# Patient Record
Sex: MALE | Race: WHITE | Employment: UNEMPLOYED
[De-identification: names, ages, dates, MRNs, and addresses within clinical notes are randomized per-mention and may not be internally consistent; named-entity substitution may affect disease eponyms.]

---

## 2017-12-13 PROBLEM — J40 BRONCHITIS: Status: ACTIVE | Noted: 2017-12-13

## 2017-12-13 PROBLEM — J16.0 CAP (COMMUNITY ACQUIRED PNEUMONIA) DUE TO CHLAMYDIA SPECIES: Status: ACTIVE | Noted: 2017-12-13

## 2017-12-13 PROBLEM — R09.02 HYPOXIA: Status: ACTIVE | Noted: 2017-12-13

## 2017-12-14 ENCOUNTER — TELEPHONE (OUTPATIENT)
Dept: CASE MANAGEMENT | Age: 57
End: 2017-12-14

## 2017-12-14 NOTE — TELEPHONE ENCOUNTER
Spoke with pt regarding pulmonary nodule finding on CTA. Pt states he is following up with his PCP and does not need services of [address] [place] [address] my number if he should have any further questions.

## 2020-02-01 ENCOUNTER — APPOINTMENT (OUTPATIENT)
Dept: GENERAL RADIOLOGY | Age: 60
End: 2020-02-01
Payer: MEDICARE

## 2020-02-01 ENCOUNTER — HOSPITAL ENCOUNTER (EMERGENCY)
Age: 60
Discharge: HOME | End: 2020-02-01
Attending: EMERGENCY MEDICINE
Payer: MEDICARE

## 2020-02-01 VITALS
OXYGEN SATURATION: 97 % | DIASTOLIC BLOOD PRESSURE: 97 MMHG | RESPIRATION RATE: 16 BRPM | WEIGHT: 240 LBS | HEART RATE: 66 BPM | TEMPERATURE: 98.3 F | BODY MASS INDEX: 36.37 KG/M2 | SYSTOLIC BLOOD PRESSURE: 156 MMHG | HEIGHT: 68 IN

## 2020-02-01 LAB
RAPID INFLUENZA  B AGN: NEGATIVE
RAPID INFLUENZA A AGN: NEGATIVE

## 2020-02-01 PROCEDURE — 99283 EMERGENCY DEPT VISIT LOW MDM: CPT

## 2020-02-01 PROCEDURE — 71046 X-RAY EXAM CHEST 2 VIEWS: CPT

## 2020-02-01 PROCEDURE — 6360000002 HC RX W HCPCS: Performed by: EMERGENCY MEDICINE

## 2020-02-01 PROCEDURE — 87804 INFLUENZA ASSAY W/OPTIC: CPT

## 2020-02-01 PROCEDURE — 6370000000 HC RX 637 (ALT 250 FOR IP): Performed by: EMERGENCY MEDICINE

## 2020-02-01 RX ORDER — PREDNISONE 20 MG/1
60 TABLET ORAL ONCE
Status: COMPLETED | OUTPATIENT
Start: 2020-02-01 | End: 2020-02-01

## 2020-02-01 RX ORDER — BENZONATATE 100 MG/1
100 CAPSULE ORAL 3 TIMES DAILY PRN
Qty: 20 CAPSULE | Refills: 0 | Status: SHIPPED | OUTPATIENT
Start: 2020-02-01 | End: 2020-02-08

## 2020-02-01 RX ORDER — BENZONATATE 100 MG/1
100 CAPSULE ORAL ONCE
Status: COMPLETED | OUTPATIENT
Start: 2020-02-01 | End: 2020-02-01

## 2020-02-01 RX ORDER — ALBUTEROL SULFATE 2.5 MG/3ML
5 SOLUTION RESPIRATORY (INHALATION) ONCE
Status: COMPLETED | OUTPATIENT
Start: 2020-02-01 | End: 2020-02-01

## 2020-02-01 RX ORDER — ALBUTEROL SULFATE 90 UG/1
2 AEROSOL, METERED RESPIRATORY (INHALATION) EVERY 4 HOURS PRN
Qty: 1 INHALER | Refills: 0 | Status: SHIPPED | OUTPATIENT
Start: 2020-02-01 | End: 2020-02-08

## 2020-02-01 RX ORDER — PREDNISONE 20 MG/1
40 TABLET ORAL DAILY
Qty: 10 TABLET | Refills: 0 | Status: SHIPPED | OUTPATIENT
Start: 2020-02-01 | End: 2020-02-06

## 2020-02-01 RX ORDER — FLUTICASONE PROPIONATE 50 MCG
1 SPRAY, SUSPENSION (ML) NASAL DAILY
Qty: 1 BOTTLE | Refills: 0 | Status: SHIPPED | OUTPATIENT
Start: 2020-02-01 | End: 2020-02-11

## 2020-02-01 RX ADMIN — PREDNISONE 60 MG: 20 TABLET ORAL at 05:29

## 2020-02-01 RX ADMIN — BENZONATATE 100 MG: 100 CAPSULE ORAL at 05:29

## 2020-02-01 RX ADMIN — ALBUTEROL SULFATE 5 MG: 2.5 SOLUTION RESPIRATORY (INHALATION) at 05:29

## 2020-02-01 ASSESSMENT — PAIN DESCRIPTION - LOCATION: LOCATION: GENERALIZED

## 2020-02-01 ASSESSMENT — PAIN SCALES - GENERAL: PAINLEVEL_OUTOF10: 7

## 2020-02-01 ASSESSMENT — PAIN DESCRIPTION - PAIN TYPE: TYPE: ACUTE PAIN

## 2020-02-01 ASSESSMENT — PAIN DESCRIPTION - DESCRIPTORS: DESCRIPTORS: ACHING

## 2020-02-01 NOTE — ED PROVIDER NOTES
Emergency Physician Note    Chief Complaint  Cough (pt comes in with cough for about a week , felling like \"crap\". has a PCP appt tuesday but feeling too bad to wait. )       History of Present Illness  Nicola Snow Covert is a 63 y.o. male who presents to the ED for upper respiratory infection. Patient states for the past 5 days he has had a cough that is intermittently productive of green phlegm. He said at one point he did have a fever as high as 102 °F.  He does smoke but he admits that he has been avoiding cigarette for the last 5 days due to his cough. He is also got sinus pressure nasal congestion and postnasal drip. Patient does have an upcoming appointment with his primary care physician but he states that he is having trouble sleeping due to the cough and that is why came in for further evaluation. Denies   chills, malaise, chest pain, shortness of breath,   abdominal pain, nausea, vomiting, diarrhea, headache, sore throat, dysuria, back pain, rash. No palliative/provocative factors. Unless otherwise stated in this report or unable to obtain because of the patient's clinical or mental status as evidenced by the medical record, this patient's positive and negative responses for review of systems, constitutional, psych, eyes, ENT, cardiovascular, respiratory, gastrointestinal, neurological, genitourinary, musculoskeletal, integument systems and systems related to the presenting problem are either stated in the preceding paragraph or were not pertinent or were negative for the symptoms and/or complaints related to the medical problem. I have reviewed the following from the nursing documentation:      Prior to Admission medications    Medication Sig Start Date End Date Taking?  Authorizing Provider   albuterol sulfate  (90 Base) MCG/ACT inhaler Inhale 2 puffs into the lungs every 4 hours as needed for Wheezing 2/1/20 2/8/20 Yes Wendi Pastor MD   benzonatate (TESSALON) 100 MG Refill:  0    fluticasone (FLONASE) 50 MCG/ACT nasal spray     Si spray by Nasal route daily for 10 days     Dispense:  1 Bottle     Refill:  0    predniSONE (DELTASONE) 20 MG tablet     Sig: Take 2 tablets by mouth daily for 5 days     Dispense:  10 tablet     Refill:  0       ED course notes for this visit           - Patient seen and evaluated in room 8    This is a pleasant patient whose history and physical exam appear most consistent with URI with symptoms of bronchitis, sinusitis, and/or rhinitis. I completed a structured, evidence-based clinical evaluation to determine the need for antibiotics in this patient with clinical signs and symptoms of URI. The evidence indicates that the patient is low risk for infectious process requiring antibiotics. Antibiotics have significant risks, and in this case the risk of antibiotics likely exceeds the benefit. The risk of further imaging or hospitalization is also higher than the risk of the patient having an infectious respiratory process requiring antibiotics. It is, therefore, in the patients best interest to not take antibiotics, undergo emergent testing or be hospitalized. I have discussed with the patient my clinical impression and the result of an evidence-based clinical evaluation to screen for pneumonia and/or bacterial sinusitis, as well as the risk of further testing and hospitalization. The evidence shows that the risk for pneumonia is low. Although the risk of pneumonia has not been eliminated, the risks of antibiotics, further testing or hospitalization likely exceed the benefit, and the patient declines antibiotics, further emergent evaluation, or hospitalization for pneumonia. I feel they are stable for discharge home with appropriate medications for symptomatic relief.   It is understood that if the patient is not improving as expected or if other new signs or symptoms of concern develop, other etiologies or diagnoses may need to be considered requiring other tests, treatments, consultations, and/or admission. The diagnosis, plan, expected course, follow-up, and return precautions were discussed and all questions were answered. This is a pleasant patient with an upper respiratory tract infection. On lung exam, patient does not have any wheezing, Rales, rhonchi and is not in any respiratory distress. This appears viral and without significant evidence of severe respiratory distress, severe upper or lower airway compromise, hypoxemia, toxicity, shock, sepsis, hemodynamic or cardiopulmonary instability, epiglottitis, peritonsillar abscess, retropharyngeal abscess, bacterial tracheitis, pneumonia, or any disease process requiring other immediate surgical or medical intervention at this time. It is understood that if the patient is not improving as expected or if other new symptoms or signs of concern develop, other etiologies or diagnoses may need to be considered requiring other tests, treatments, consultations, and/or admission. The diagnosis, plan, expected course, follow-up, and return precautions were discussed and all questions were answered. Final Impression    1. Acute bronchitis, unspecified organism    2. Tobacco use disorder    3. Elevated blood pressure reading    4. Acute upper respiratory infection        Blood pressure (!) 156/97, pulse 66, temperature 98.3 °F (36.8 °C), temperature source Oral, resp. rate 16, height 5' 8\" (1.727 m), weight 240 lb (108.9 kg), SpO2 97 %. Disposition  At this point I do not feel the patient requires further work up and it is reasonable to discharge the patient. I had a discussion with the patient and/or their surrogate regarding diagnosis, diagnostic testing results, treatment/ plan of care, and follow up. Patient and/or companions verbalized understanding of the ED workup, any relevant findings as well as any incidental findings, and the disposition and plan.   There was shared decision-making between myself as well as the patient and/or their surrogate and we are all in agreement with discharge home. There was an opportunity for questions and all questions were answered to the best of my ability and to the satisfaction of the patient and/or patient family. Patient agreed to follow up as recommend for further evaluation/treatment. The patient was given strict return precautions as we discussed symptoms that would necessitate return to the ED. Patient will return to ED for new/worsening symptoms. The patient verbalized their understanding and agreement with the above plan. Please refer to AVS for further details regarding discharge instructions. Patient was given scripts for the following medications. I counseled patient how to take these medications. Discharge Medication List as of 2/1/2020  5:38 AM      START taking these medications    Details   benzonatate (TESSALON) 100 MG capsule Take 1 capsule by mouth 3 times daily as needed for Cough, Disp-20 capsule, R-0Print      fluticasone (FLONASE) 50 MCG/ACT nasal spray 1 spray by Nasal route daily for 10 days, Disp-1 Bottle, R-0Print      predniSONE (DELTASONE) 20 MG tablet Take 2 tablets by mouth daily for 5 days, Disp-10 tablet, R-0Print               Pt is in stable condition upon Discharge to home. The note was completed using Dragon voice recognition transcription. Every effort was made to ensure accuracy; however, inadvertent transcription errors may be present despite my best efforts to edit errors.     Marylin Urban MD  [address] Bah MD  02/01/20 6672

## 2020-03-23 ENCOUNTER — HOSPITAL ENCOUNTER (EMERGENCY)
Age: 60
Discharge: HOME | End: 2020-03-23
Payer: MEDICARE

## 2020-03-23 ENCOUNTER — APPOINTMENT (OUTPATIENT)
Dept: CT IMAGING | Age: 60
End: 2020-03-23
Payer: MEDICARE

## 2020-03-23 VITALS
TEMPERATURE: 98.1 F | RESPIRATION RATE: 16 BRPM | SYSTOLIC BLOOD PRESSURE: 148 MMHG | HEIGHT: 68 IN | DIASTOLIC BLOOD PRESSURE: 66 MMHG | BODY MASS INDEX: 37.13 KG/M2 | HEART RATE: 87 BPM | OXYGEN SATURATION: 98 % | WEIGHT: 245 LBS

## 2020-03-23 LAB
A/G RATIO: 1.2 (ref 1.1–2.2)
ALBUMIN SERPL-MCNC: 3.8 G/DL (ref 3.4–5)
ALP BLD-CCNC: 111 U/L (ref 40–129)
ALT SERPL-CCNC: 18 U/L (ref 10–40)
ANION GAP SERPL CALCULATED.3IONS-SCNC: 13 MMOL/L (ref 3–16)
AST SERPL-CCNC: 12 U/L (ref 15–37)
BASOPHILS ABSOLUTE: 0.1 K/UL (ref 0–0.2)
BASOPHILS RELATIVE PERCENT: 0.4 %
BILIRUB SERPL-MCNC: 0.3 MG/DL (ref 0–1)
BILIRUBIN URINE: NEGATIVE
BLOOD, URINE: NEGATIVE
BUN BLDV-MCNC: 24 MG/DL (ref 7–20)
C DIFF TOXIN/ANTIGEN: NORMAL
CALCIUM SERPL-MCNC: 8.8 MG/DL (ref 8.3–10.6)
CHLORIDE BLD-SCNC: 95 MMOL/L (ref 99–110)
CLARITY: CLEAR
CO2: 24 MMOL/L (ref 21–32)
COLOR: YELLOW
CREAT SERPL-MCNC: 0.7 MG/DL (ref 0.9–1.3)
EKG ATRIAL RATE: 92 BPM
EKG DIAGNOSIS: NORMAL
EKG P AXIS: 58 DEGREES
EKG P-R INTERVAL: 166 MS
EKG Q-T INTERVAL: 354 MS
EKG QRS DURATION: 88 MS
EKG QTC CALCULATION (BAZETT): 437 MS
EKG R AXIS: 66 DEGREES
EKG T AXIS: 58 DEGREES
EKG VENTRICULAR RATE: 92 BPM
EOSINOPHILS ABSOLUTE: 0 K/UL (ref 0–0.6)
EOSINOPHILS RELATIVE PERCENT: 0 %
GFR AFRICAN AMERICAN: >60
GFR NON-AFRICAN AMERICAN: >60
GLOBULIN: 3.2 G/DL
GLUCOSE BLD-MCNC: 148 MG/DL (ref 70–99)
GLUCOSE URINE: NEGATIVE MG/DL
HCT VFR BLD CALC: 46.1 % (ref 40.5–52.5)
HEMOGLOBIN: 15.5 G/DL (ref 13.5–17.5)
KETONES, URINE: NEGATIVE MG/DL
LEUKOCYTE ESTERASE, URINE: NEGATIVE
LIPASE: 20 U/L (ref 13–60)
LYMPHOCYTES ABSOLUTE: 1.5 K/UL (ref 1–5.1)
LYMPHOCYTES RELATIVE PERCENT: 8.6 %
MCH RBC QN AUTO: 29.6 PG (ref 26–34)
MCHC RBC AUTO-ENTMCNC: 33.7 G/DL (ref 31–36)
MCV RBC AUTO: 88 FL (ref 80–100)
MICROSCOPIC EXAMINATION: NORMAL
MONOCYTES ABSOLUTE: 0.9 K/UL (ref 0–1.3)
MONOCYTES RELATIVE PERCENT: 5.6 %
NEUTROPHILS ABSOLUTE: 14.6 K/UL (ref 1.7–7.7)
NEUTROPHILS RELATIVE PERCENT: 85.4 %
NITRITE, URINE: NEGATIVE
PDW BLD-RTO: 14 % (ref 12.4–15.4)
PH UA: 6 (ref 5–8)
PLATELET # BLD: 353 K/UL (ref 135–450)
PMV BLD AUTO: 7.5 FL (ref 5–10.5)
POTASSIUM REFLEX MAGNESIUM: 4.3 MMOL/L (ref 3.5–5.1)
PROTEIN UA: NEGATIVE MG/DL
RBC # BLD: 5.24 M/UL (ref 4.2–5.9)
SODIUM BLD-SCNC: 132 MMOL/L (ref 136–145)
SPECIFIC GRAVITY UA: 1.02 (ref 1–1.03)
TOTAL PROTEIN: 7 G/DL (ref 6.4–8.2)
TROPONIN: <0.01 NG/ML
URINE REFLEX TO CULTURE: NORMAL
URINE TYPE: NORMAL
UROBILINOGEN, URINE: 0.2 E.U./DL
WBC # BLD: 17.1 K/UL (ref 4–11)

## 2020-03-23 PROCEDURE — 87449 NOS EACH ORGANISM AG IA: CPT

## 2020-03-23 PROCEDURE — 99284 EMERGENCY DEPT VISIT MOD MDM: CPT

## 2020-03-23 PROCEDURE — 85025 COMPLETE CBC W/AUTO DIFF WBC: CPT

## 2020-03-23 PROCEDURE — 81003 URINALYSIS AUTO W/O SCOPE: CPT

## 2020-03-23 PROCEDURE — 83690 ASSAY OF LIPASE: CPT

## 2020-03-23 PROCEDURE — 6360000004 HC RX CONTRAST MEDICATION: Performed by: PHYSICIAN ASSISTANT

## 2020-03-23 PROCEDURE — 87493 C DIFF AMPLIFIED PROBE: CPT

## 2020-03-23 PROCEDURE — 84484 ASSAY OF TROPONIN QUANT: CPT

## 2020-03-23 PROCEDURE — 6360000002 HC RX W HCPCS: Performed by: PHYSICIAN ASSISTANT

## 2020-03-23 PROCEDURE — 96374 THER/PROPH/DIAG INJ IV PUSH: CPT

## 2020-03-23 PROCEDURE — 6370000000 HC RX 637 (ALT 250 FOR IP): Performed by: PHYSICIAN ASSISTANT

## 2020-03-23 PROCEDURE — 2580000003 HC RX 258: Performed by: PHYSICIAN ASSISTANT

## 2020-03-23 PROCEDURE — 80053 COMPREHEN METABOLIC PANEL: CPT

## 2020-03-23 PROCEDURE — 96361 HYDRATE IV INFUSION ADD-ON: CPT

## 2020-03-23 PROCEDURE — 87505 NFCT AGENT DETECTION GI: CPT

## 2020-03-23 PROCEDURE — 93005 ELECTROCARDIOGRAM TRACING: CPT | Performed by: PHYSICIAN ASSISTANT

## 2020-03-23 PROCEDURE — 74177 CT ABD & PELVIS W/CONTRAST: CPT

## 2020-03-23 PROCEDURE — 87324 CLOSTRIDIUM AG IA: CPT

## 2020-03-23 PROCEDURE — 93010 ELECTROCARDIOGRAM REPORT: CPT | Performed by: INTERNAL MEDICINE

## 2020-03-23 RX ORDER — VANCOMYCIN HYDROCHLORIDE 125 MG/1
125 CAPSULE ORAL 4 TIMES DAILY
Qty: 40 CAPSULE | Refills: 0 | Status: SHIPPED | OUTPATIENT
Start: 2020-03-23 | End: 2020-04-02

## 2020-03-23 RX ORDER — DICYCLOMINE HYDROCHLORIDE 10 MG/1
10 CAPSULE ORAL EVERY 6 HOURS PRN
Qty: 20 CAPSULE | Refills: 0 | Status: SHIPPED | OUTPATIENT
Start: 2020-03-23

## 2020-03-23 RX ORDER — ONDANSETRON 2 MG/ML
4 INJECTION INTRAMUSCULAR; INTRAVENOUS EVERY 6 HOURS PRN
Status: DISCONTINUED | OUTPATIENT
Start: 2020-03-23 | End: 2020-03-23 | Stop reason: HOSPADM

## 2020-03-23 RX ORDER — 0.9 % SODIUM CHLORIDE 0.9 %
1000 INTRAVENOUS SOLUTION INTRAVENOUS ONCE
Status: COMPLETED | OUTPATIENT
Start: 2020-03-23 | End: 2020-03-23

## 2020-03-23 RX ORDER — ONDANSETRON 4 MG/1
4 TABLET, FILM COATED ORAL EVERY 8 HOURS PRN
Qty: 20 TABLET | Refills: 0 | Status: SHIPPED | OUTPATIENT
Start: 2020-03-23

## 2020-03-23 RX ORDER — DICYCLOMINE HYDROCHLORIDE 10 MG/1
10 CAPSULE ORAL ONCE
Status: COMPLETED | OUTPATIENT
Start: 2020-03-23 | End: 2020-03-23

## 2020-03-23 RX ADMIN — IOPAMIDOL 75 ML: 755 INJECTION, SOLUTION INTRAVENOUS at 13:27

## 2020-03-23 RX ADMIN — Medication 125 MG: at 15:33

## 2020-03-23 RX ADMIN — LIDOCAINE HYDROCHLORIDE: 20 SOLUTION ORAL; TOPICAL at 12:44

## 2020-03-23 RX ADMIN — DICYCLOMINE HYDROCHLORIDE 10 MG: 10 CAPSULE ORAL at 14:36

## 2020-03-23 RX ADMIN — SODIUM CHLORIDE 1000 ML: 9 INJECTION, SOLUTION INTRAVENOUS at 12:44

## 2020-03-23 RX ADMIN — ONDANSETRON HYDROCHLORIDE 4 MG: 2 INJECTION, SOLUTION INTRAVENOUS at 12:44

## 2020-03-23 ASSESSMENT — ENCOUNTER SYMPTOMS
VOMITING: 1
NAUSEA: 1
RESPIRATORY NEGATIVE: 1
DIARRHEA: 1

## 2020-03-23 ASSESSMENT — PAIN DESCRIPTION - LOCATION: LOCATION: BACK

## 2020-03-23 ASSESSMENT — PAIN SCALES - GENERAL: PAINLEVEL_OUTOF10: 8

## 2020-03-23 NOTE — ED PROVIDER NOTES
Negative. Positives and Pertinent negatives as per HPI. Except as noted above in the ROS, all other systems were reviewed and negative. PAST MEDICAL HISTORY     Past Medical History:   Diagnosis Date    Anxiety     GERD (gastroesophageal reflux disease)     Hypertension          SURGICAL HISTORY     Past Surgical History:   Procedure Laterality Date    ANKLE SURGERY Right     HAND SURGERY Left     KNEE SURGERY Right     ROTATOR CUFF REPAIR Right          CURRENTMEDICATIONS       Previous Medications    ALBUTEROL SULFATE  (90 BASE) MCG/ACT INHALER    Inhale 2 puffs into the lungs every 4 hours as needed for Wheezing    AMPHETAMINE-DEXTROAMPHETAMINE (ADDERALL) 30 MG TABLET    TAKE 1 TABLET BY MOUTH EVERY MORNING AND AFTERNOON    ASPIRIN 81 MG TABLET    Take 81 mg by mouth daily. FLUTICASONE (FLONASE) 50 MCG/ACT NASAL SPRAY    1 spray by Nasal route daily for 10 days    LISINOPRIL (PRINIVIL;ZESTRIL) 40 MG TABLET    Take 40 mg by mouth daily. ALLERGIES     Patient has no known allergies. FAMILYHISTORY     History reviewed. No pertinent family history. SOCIAL HISTORY       Social History     Tobacco Use    Smoking status: Current Every Day Smoker     Packs/day: 0.50     Types: Cigarettes    Smokeless tobacco: Never Used   Substance Use Topics    Alcohol use: No    Drug use: No       SCREENINGS    [place] Coma Scale  Eye Opening: Spontaneous  Best Verbal Response: Oriented  Best Motor Response: Obeys commands  [place] Coma Scale Score: 15        PHYSICAL EXAM    (up to 7 for level 4, 8 or more for level 5)     ED Triage Vitals [03/23/20 1149]   BP Temp Temp Source Pulse Resp SpO2 Height Weight   (!) 165/107 98.1 °F (36.7 °C) Oral 89 15 99 % 5' 8\" (1.727 m) 245 lb (111.1 kg)       Physical Exam  Vitals signs and nursing note reviewed. Constitutional:       General: He is awake. He is not in acute distress. Appearance: Normal appearance.  He is well-developed and which are fluid   filled. Findings consistent with mild enterocolitis. No evidence of   obstruction. No results found. PROCEDURES   Unless otherwise noted below, none     Procedures    CRITICAL CARE TIME   N/A    CONSULTS:  None      EMERGENCY DEPARTMENT COURSE and DIFFERENTIAL DIAGNOSIS/MDM:   Vitals:    Vitals:    03/23/20 1149 03/23/20 1201 03/23/20 1407   BP: (!) 165/107 (!) 169/104 (!) 156/70   Pulse: 89 84 78   Resp: 15 18 16   Temp: 98.1 °F (36.7 °C)     TempSrc: Oral     SpO2: 99% 96% 97%   Weight: 245 lb (111.1 kg)     Height: 5' 8\" (1.727 m)         Patient was given the following medications:  Medications   ondansetron (ZOFRAN) injection 4 mg (4 mg Intravenous Given 3/23/20 1244)   vancomycin (VANCOCIN) oral solution 125 mg (has no administration in time range)   0.9 % sodium chloride bolus (0 mLs Intravenous Stopped 3/23/20 1351)   aluminum & magnesium hydroxide-simethicone (MAALOX) 30 mL, lidocaine viscous hcl (XYLOCAINE) 5 mL (GI COCKTAIL) ( Oral Given 3/23/20 1244)   iopamidol (ISOVUE-370) 76 % injection 75 mL (75 mLs Intravenous Given 3/23/20 1327)   dicyclomine (BENTYL) capsule 10 mg (10 mg Oral Given 3/23/20 1436)       Patient brought in today by private vehicle for complaints of nonbilious nonbloody nausea and vomiting with nonbloody diarrhea. He also reports a lump to his left lower back. On exam he is alert oriented afebrile breathing on room air satting at 99%. Nontoxic in appearance no acute respiratory distress. Old labs and records reviewed. Patient appears to have a small lipoma to his back left lower back. I do not appreciate any abscess, skin changes color streaking or concern for infection. Patient told he can follow-up with his PCP for possible removal in a nonemergent setting. Patient initially given fluids as well as Zofran and a GI cocktail. Also given Bentyl. CBC reveals an elevated leukocytosis of 17.1. BUN of 24 with creatinine of 0.7. Sodium of 132. Blood glucose of 148. Lipase of 20. Troponin is less than 0.01. EKG was reviewed by my attending see note for dictation. CT abdomen reveals mild hyperenhancement of the small large bowel which are fluid-filled. Findings consistent with a mild enterocolitis. No evidence of obstruction. Urine is negative. He will be given Bentyl and Zofran at home. Patient p.o. challenged here and able to tolerate p.o. C. difficile results came back as indeterminant however normal range is negative will send for further testing at this time. Patient given p.o. vancomycin here given indeterminate C. difficile results. I advised him that we would call if results are positive and continue to take the vancomycin. Patient be discharged with Zofran and Bentyl for home as well as the vancomycin for his possible C. difficile. He was told to continue with fluids to stay hydrated. Stressed the importance of staying hydrated. He was told to follow-up with his PCP in the next 1 week. He was told return immediately to the ER with any new or worsening symptoms including but not limited to intractable nausea vomiting increasing pain or any new or worsening symptoms. Patient verbalized understanding of this plan was comfortable and stable at time of discharge. I did feel comfortable sending this patient home with close follow-up instructions and strict return precautions. FINAL IMPRESSION      1. Enterocolitis    2. C. difficile diarrhea          DISPOSITION/PLAN   DISPOSITION Discharge - Pending Orders Complete 03/23/2020 03:14:30 PM      PATIENT REFERREDTO:  Nicholas Cabral MD  [address]   [address] [facility] [address]  [phone]    Schedule an appointment as soon as possible for a visit in 1 day      [facility] [facility]  [address] [facility]  Schedule an appointment as soon as possible for a visit   As needed, If symptoms worsen      DISCHARGE

## 2020-03-23 NOTE — ED NOTES
Pt to/from radiology, pt requesting percocet. Provider advised.       Elizabeth Thomas RN  03/23/20 7213

## 2020-03-24 LAB
C. DIFFICILE TOXIN MOLECULAR: ABNORMAL
GI BACTERIAL PATHOGENS BY PCR: NORMAL
ORGANISM: ABNORMAL

## 2020-05-25 ENCOUNTER — APPOINTMENT (OUTPATIENT)
Dept: GENERAL RADIOLOGY | Age: 60
End: 2020-05-25
Payer: MEDICARE

## 2020-05-25 ENCOUNTER — HOSPITAL ENCOUNTER (EMERGENCY)
Age: 60
Discharge: HOME | End: 2020-05-25
Attending: EMERGENCY MEDICINE
Payer: MEDICARE

## 2020-05-25 VITALS
TEMPERATURE: 98.6 F | DIASTOLIC BLOOD PRESSURE: 87 MMHG | OXYGEN SATURATION: 98 % | RESPIRATION RATE: 18 BRPM | BODY MASS INDEX: 38.01 KG/M2 | SYSTOLIC BLOOD PRESSURE: 145 MMHG | WEIGHT: 250 LBS | HEART RATE: 78 BPM

## 2020-05-25 PROCEDURE — 99283 EMERGENCY DEPT VISIT LOW MDM: CPT

## 2020-05-25 PROCEDURE — 73610 X-RAY EXAM OF ANKLE: CPT

## 2020-05-25 PROCEDURE — 6370000000 HC RX 637 (ALT 250 FOR IP): Performed by: EMERGENCY MEDICINE

## 2020-05-25 RX ORDER — HYDROCODONE BITARTRATE AND ACETAMINOPHEN 5; 325 MG/1; MG/1
1 TABLET ORAL EVERY 4 HOURS PRN
Qty: 18 TABLET | Refills: 0 | Status: SHIPPED | OUTPATIENT
Start: 2020-05-25 | End: 2020-05-28

## 2020-05-25 RX ORDER — HYDROCODONE BITARTRATE AND ACETAMINOPHEN 5; 325 MG/1; MG/1
1 TABLET ORAL ONCE
Status: COMPLETED | OUTPATIENT
Start: 2020-05-25 | End: 2020-05-25

## 2020-05-25 RX ADMIN — HYDROCODONE BITARTRATE AND ACETAMINOPHEN 1 TABLET: 5; 325 TABLET ORAL at 03:46

## 2020-05-25 ASSESSMENT — PAIN SCALES - GENERAL: PAINLEVEL_OUTOF10: 10

## 2020-05-31 NOTE — ED PROVIDER NOTES
Attends meetings of clubs or organizations: Not on file     Relationship status: Not on file    Intimate partner violence     Fear of current or ex partner: Not on file     Emotionally abused: Not on file     Physically abused: Not on file     Forced sexual activity: Not on file   Other Topics Concern    Not on file   Social History Narrative    Not on file     No current facility-administered medications for this encounter. Current Outpatient Medications   Medication Sig Dispense Refill    ondansetron (ZOFRAN) 4 MG tablet Take 1 tablet by mouth every 8 hours as needed for Nausea 20 tablet 0    dicyclomine (BENTYL) 10 MG capsule Take 1 capsule by mouth every 6 hours as needed (cramps) 20 capsule 0    albuterol sulfate  (90 Base) MCG/ACT inhaler Inhale 2 puffs into the lungs every 4 hours as needed for Wheezing 1 Inhaler 0    fluticasone (FLONASE) 50 MCG/ACT nasal spray 1 spray by Nasal route daily for 10 days 1 Bottle 0    amphetamine-dextroamphetamine (ADDERALL) 30 MG tablet TAKE 1 TABLET BY MOUTH EVERY MORNING AND AFTERNOON  0    lisinopril (PRINIVIL;ZESTRIL) 40 MG tablet Take 40 mg by mouth daily.  aspirin 81 MG tablet Take 81 mg by mouth daily. No Known Allergies    REVIEW OF SYSTEMS  10 systems reviewed, pertinent positives per HPI otherwise noted to be negative. PHYSICAL EXAM  BP (!) 145/87   Pulse 78   Temp 98.6 °F (37 °C) (Oral)   Resp 18   Wt 250 lb (113.4 kg)   SpO2 98%   BMI 38.01 kg/m²   GENERAL APPEARANCE: Awake and alert. Cooperative. No acute distress. HEAD: Normocephalic. Atraumatic. EYES: PERRL. EOM's grossly intact. ENT: Mucous membranes are moist.   NECK: Supple. HEART: RRR. CHEST/LUNGS: Chest atraumatic, nontender, respirations unlabored. CTAB. Good air exchange. Speaking comfortably in full sentences. BACK: No midline spinal tenderness or step-off. ABDOMEN: Soft. Non-distended. Non-tender. No guarding or rebound.  Normal bowel sounds. EXTREMITIES: Pain with passive and active range of motion of the right ankle mild swelling noted without any redness or ecchymosis. No deformity. . All extremities neurovascularly intact. RECTAL/: Deferred  SKIN: Warm and dry. No acute rashes. NEUROLOGICAL: Alert and oriented. CN 2-12 intact, No gross facial drooping. Strength 5/5, sensation intact. Normal coordination. Darling Aguilar RADIOLOGY  X-RAYS:  I have reviewed radiologic plain film image(s). ALL OTHER NON-PLAIN FILM IMAGES SUCH AS CT, ULTRASOUND AND MRI HAVE BEEN READ BY THE RADIOLOGIST. XR ANKLE RIGHT (MIN 3 VIEWS)   Final Result   1. No acute findings in the right ankle. 2. Arthrodesis of the right subtalar joints with no evident complication. 3. Osteoarthritic changes in the right ankle, hindfoot, and midfoot as above. ED COURSE/MDM  Patient seen and evaluated. She with sprain of right ankle. Radiographic imaging of the right ankle show some arthrodesis of the right subtalar joints and osteoarthritic changes but otherwise no fracture or other acute concern at this time. We will wrap the ankle with Ace wrap give the patient crutches that he can follow-up as an outpatient. All diagnostic tests reviewed and results discussed with patient. Plan of care discussed with patient. Patient in agreement with plan. Discharge Medication List as of 5/25/2020  3:38 AM          CLINICAL IMPRESSION  1. Sprain of right ankle, unspecified ligament, initial encounter        Blood pressure (!) 145/87, pulse 78, temperature 98.6 °F (37 °C), temperature source Oral, resp. rate 18, weight 250 lb (113.4 kg), SpO2 98 %. DISPOSITION  Desire Jacobo was discharged to home in stable condition. This chart was generated in part by using Dragon Dictation system and may contain errors related to that system including errors in grammar, punctuation, and spelling, as well as words and phrases that may be inappropriate.  When dictating, effort is made to correct spelling/grammar errors. If there are any questions or concerns please feel free to contact the dictating provider for clarification.      Sammy Gonzalez DO  ATTENDING, [facility], DO  05/30/20 9106

## 2020-11-18 ENCOUNTER — APPOINTMENT (OUTPATIENT)
Dept: GENERAL RADIOLOGY | Age: 60
End: 2020-11-18
Payer: MEDICARE

## 2020-11-18 ENCOUNTER — HOSPITAL ENCOUNTER (EMERGENCY)
Age: 60
Discharge: HOME | End: 2020-11-19
Payer: MEDICARE

## 2020-11-18 VITALS
TEMPERATURE: 98.2 F | HEART RATE: 81 BPM | DIASTOLIC BLOOD PRESSURE: 71 MMHG | RESPIRATION RATE: 12 BRPM | SYSTOLIC BLOOD PRESSURE: 129 MMHG | OXYGEN SATURATION: 96 %

## 2020-11-18 LAB
A/G RATIO: 1.6 (ref 1.1–2.2)
ALBUMIN SERPL-MCNC: 4.3 G/DL (ref 3.4–5)
ALP BLD-CCNC: 91 U/L (ref 40–129)
ALT SERPL-CCNC: 18 U/L (ref 10–40)
ANION GAP SERPL CALCULATED.3IONS-SCNC: 8 MMOL/L (ref 3–16)
AST SERPL-CCNC: 16 U/L (ref 15–37)
BASOPHILS ABSOLUTE: 0.1 K/UL (ref 0–0.2)
BASOPHILS RELATIVE PERCENT: 0.7 %
BILIRUB SERPL-MCNC: <0.2 MG/DL (ref 0–1)
BUN BLDV-MCNC: 28 MG/DL (ref 7–20)
CALCIUM SERPL-MCNC: 9.6 MG/DL (ref 8.3–10.6)
CHLORIDE BLD-SCNC: 97 MMOL/L (ref 99–110)
CO2: 31 MMOL/L (ref 21–32)
CREAT SERPL-MCNC: 0.8 MG/DL (ref 0.9–1.3)
EOSINOPHILS ABSOLUTE: 0.2 K/UL (ref 0–0.6)
EOSINOPHILS RELATIVE PERCENT: 2.4 %
GFR AFRICAN AMERICAN: >60
GFR NON-AFRICAN AMERICAN: >60
GLOBULIN: 2.7 G/DL
GLUCOSE BLD-MCNC: 126 MG/DL (ref 70–99)
HCT VFR BLD CALC: 42.4 % (ref 40.5–52.5)
HEMOGLOBIN: 14.3 G/DL (ref 13.5–17.5)
LYMPHOCYTES ABSOLUTE: 2.8 K/UL (ref 1–5.1)
LYMPHOCYTES RELATIVE PERCENT: 30.1 %
MAGNESIUM: 2.3 MG/DL (ref 1.8–2.4)
MCH RBC QN AUTO: 30.5 PG (ref 26–34)
MCHC RBC AUTO-ENTMCNC: 33.7 G/DL (ref 31–36)
MCV RBC AUTO: 90.5 FL (ref 80–100)
MONOCYTES ABSOLUTE: 0.6 K/UL (ref 0–1.3)
MONOCYTES RELATIVE PERCENT: 6.2 %
NEUTROPHILS ABSOLUTE: 5.6 K/UL (ref 1.7–7.7)
NEUTROPHILS RELATIVE PERCENT: 60.6 %
PDW BLD-RTO: 13.7 % (ref 12.4–15.4)
PLATELET # BLD: 276 K/UL (ref 135–450)
PMV BLD AUTO: 7.8 FL (ref 5–10.5)
POTASSIUM SERPL-SCNC: 4.7 MMOL/L (ref 3.5–5.1)
RBC # BLD: 4.68 M/UL (ref 4.2–5.9)
SODIUM BLD-SCNC: 136 MMOL/L (ref 136–145)
TOTAL PROTEIN: 7 G/DL (ref 6.4–8.2)
TROPONIN: <0.01 NG/ML
WBC # BLD: 9.2 K/UL (ref 4–11)

## 2020-11-18 PROCEDURE — 93005 ELECTROCARDIOGRAM TRACING: CPT | Performed by: NURSE PRACTITIONER

## 2020-11-18 PROCEDURE — 84484 ASSAY OF TROPONIN QUANT: CPT

## 2020-11-18 PROCEDURE — 83735 ASSAY OF MAGNESIUM: CPT

## 2020-11-18 PROCEDURE — 80053 COMPREHEN METABOLIC PANEL: CPT

## 2020-11-18 PROCEDURE — 71046 X-RAY EXAM CHEST 2 VIEWS: CPT

## 2020-11-18 PROCEDURE — 99283 EMERGENCY DEPT VISIT LOW MDM: CPT

## 2020-11-18 PROCEDURE — 85025 COMPLETE CBC W/AUTO DIFF WBC: CPT

## 2020-11-19 NOTE — ED PROVIDER NOTES
I did not participate in the care of this patient. I did interpret the 12-lead EKG as follows:  Normal sinus rhythm, TX interval QRS QTC normal.  Normal axis.   No acute ischemic changes     Eloina Jimenes MD  11/19/20 9994

## 2020-11-19 NOTE — ED PROVIDER NOTES
Evaluated by [id number] [facility] Provider    [address] [facility]  ED      CHIEF COMPLAINT  Palpitations (started 2 days ago)    HISTORY OF PRESENT ILLNESS  Marlena Rodriguez is a 62 y.o. male who presents to the ED complaining of palpitations. Has been feeling his heart flutter, it was happening every time he tries to get comfortable. It does it every once in a while. Thinking he might need to be on Cardizem again. He was also feeling a lot of belching. He used to be on Cardizem but it has been many years and he has not been seen by cardiology recently. Reports a little nausea, no diarrhea or vomiting. Reports he is \"kind of SOB of breath\" and he had gas pain. The patient is currently rating their pain as 0/10. Treatments tried prior to arrival in the ED include: none. The patient denies other complaints, modifying factors or associated symptoms. The patient arrived to the ED via private car.     PAST MEDICAL HISTORY    Past Medical History:   Diagnosis Date    Anxiety     Clostridium difficile infection 03/23/2020    Clostridium difficile infection 03/23/2020    GERD (gastroesophageal reflux disease)     Hypertension        SURGICAL HISTORY    Past Surgical History:   Procedure Laterality Date    ANKLE SURGERY Right     HAND SURGERY Left     KNEE SURGERY Right     ROTATOR CUFF REPAIR Right        CURRENT MEDICATIONS    Current Outpatient Rx   Medication Sig Dispense Refill    ondansetron (ZOFRAN) 4 MG tablet Take 1 tablet by mouth every 8 hours as needed for Nausea 20 tablet 0    dicyclomine (BENTYL) 10 MG capsule Take 1 capsule by mouth every 6 hours as needed (cramps) 20 capsule 0    albuterol sulfate  (90 Base) MCG/ACT inhaler Inhale 2 puffs into the lungs every 4 hours as needed for Wheezing 1 Inhaler 0    fluticasone (FLONASE) 50 MCG/ACT nasal spray 1 spray by Nasal route daily for 10 days 1 Bottle 0    amphetamine-dextroamphetamine (ADDERALL) 30 MG tablet TAKE 1 TABLET BY MOUTH EVERY MORNING AND AFTERNOON  0    lisinopril (PRINIVIL;ZESTRIL) 40 MG tablet Take 40 mg by mouth daily.  aspirin 81 MG tablet Take 81 mg by mouth daily. ALLERGIES    No Known Allergies    FAMILY HISTORY    History reviewed. No pertinent family history. SOCIAL HISTORY    Social History     Socioeconomic History    Marital status: Legally      Spouse name: None    Number of children: None    Years of education: None    Highest education level: None   Occupational History    None   Social Needs    Financial resource strain: None    Food insecurity     Worry: None     Inability: None    Transportation needs     Medical: None     Non-medical: None   Tobacco Use    Smoking status: Current Every Day Smoker     Packs/day: 0.50     Types: Cigarettes    Smokeless tobacco: Never Used   Substance and Sexual Activity    Alcohol use: No    Drug use: No    Sexual activity: None   Lifestyle    Physical activity     Days per week: None     Minutes per session: None    Stress: None   Relationships    Social connections     Talks on phone: None     Gets together: None     Attends Oriental orthodox service: None     Active member of club or organization: None     Attends meetings of clubs or organizations: None     Relationship status: None    Intimate partner violence     Fear of current or ex partner: None     Emotionally abused: None     Physically abused: None     Forced sexual activity: None   Other Topics Concern    None   Social History Narrative    None       REVIEW OF SYSTEMS    10 systems reviewed, pertinent positives per HPI otherwise noted to be negative    PHYSICAL EXAM  Vitals:    11/18/20 2328   BP: 129/71   Pulse: 81   Resp: 12   Temp:    SpO2: 96%       GENERAL: Patient is well-developed, well-nourished. Awake and alert. Cooperative. Resting in bed. No apparent distress. HEENT:  Normocephalic, atraumatic. Conjunctiva appear normal. Sclera is non-icteric.   External ears are normal.    NECK: Supple with normal ROM. Trachea midline. LUNGS: Equal and symmetric chest rise. Breathing is unlabored. Speaking comfortably in full sentences. Lungs are clear bilaterally to auscultation. Without wheezing, rales, or rhonchi. CADIOVASCULAR:  Regular rate and rhythm. Normal S1-S2 sounds. No murmurs, rubs, or gallops. Capillary refill is brisk in all 4 extremities. Bilateral lower extremities are equal in size, there is no swelling observed. There is no tenderness to palpation. There is no erythema observed or warmth palpated. GI: Soft, nontender, nondistended with positive bowel sounds. No rebound tenderness, guarding or any peritoneal signs. No masses or hepatosplenomegaly    MUSCULOSKELETAL:  No gross deformities or trauma noted. Moving all extremities equally and appropriately. Normal ROM. SKIN: Warm/dry. Skin is intact. No rashes/lesions noted. PSYCHIATRIC: Mood and affect appropriate. Speech is clear and articulate. NEUROLOGIC: Alert and oriented. No focal motor or sensory deficits. LABS  I have reviewed all labs for this visit.    Results for orders placed or performed during the hospital encounter of 11/18/20   CBC auto differential   Result Value Ref Range    WBC 9.2 4.0 - 11.0 K/uL    RBC 4.68 4.20 - 5.90 M/uL    Hemoglobin 14.3 13.5 - 17.5 g/dL    Hematocrit 42.4 40.5 - 52.5 %    MCV 90.5 80.0 - 100.0 fL    MCH 30.5 26.0 - 34.0 pg    MCHC 33.7 31.0 - 36.0 g/dL    RDW 13.7 12.4 - 15.4 %    Platelets 239 647 - 396 K/uL    MPV 7.8 5.0 - 10.5 fL    Neutrophils % 60.6 %    Lymphocytes % 30.1 %    Monocytes % 6.2 %    Eosinophils % 2.4 %    Basophils % 0.7 %    Neutrophils Absolute 5.6 1.7 - 7.7 K/uL    Lymphocytes Absolute 2.8 1.0 - 5.1 K/uL    Monocytes Absolute 0.6 0.0 - 1.3 K/uL    Eosinophils Absolute 0.2 0.0 - 0.6 K/uL    Basophils Absolute 0.1 0.0 - 0.2 K/uL   Comprehensive metabolic panel   Result Value Ref Range    Sodium 136 136 - 145 mmol/L    Potassium 4.7 3.5 - 5.1 mmol/L    Chloride 97 (L) 99 - 110 mmol/L    CO2 31 21 - 32 mmol/L    Anion Gap 8 3 - 16    Glucose 126 (H) 70 - 99 mg/dL    BUN 28 (H) 7 - 20 mg/dL    CREATININE 0.8 (L) 0.9 - 1.3 mg/dL    GFR Non-African American >60 >60    GFR African American >60 >60    Calcium 9.6 8.3 - 10.6 mg/dL    Total Protein 7.0 6.4 - 8.2 g/dL    Alb 4.3 3.4 - 5.0 g/dL    Albumin/Globulin Ratio 1.6 1.1 - 2.2    Total Bilirubin <0.2 0.0 - 1.0 mg/dL    Alkaline Phosphatase 91 40 - 129 U/L    ALT 18 10 - 40 U/L    AST 16 15 - 37 U/L    Globulin 2.7 g/dL   Troponin   Result Value Ref Range    Troponin <0.01 <0.01 ng/mL   Magnesium   Result Value Ref Range    Magnesium 2.30 1.80 - 2.40 mg/dL       RADIOLOGY    Xr Chest (2 Vw)    Result Date: 11/18/2020  EXAMINATION: TWO XRAY VIEWS OF THE CHEST 11/18/2020 11:41 pm COMPARISON: February 1, 2020 HISTORY: ORDERING SYSTEM PROVIDED HISTORY: CP, SOB TECHNOLOGIST PROVIDED HISTORY: Reason for exam:->CP, SOB Reason for Exam: Palpitations (started 2 days ago) Chest pain and shortness of breath FINDINGS: Cardiomediastinal silhouette within normal limits. Lungs and costophrenic sulci are clear. No pneumothorax or subdiaphragmatic free air. No acute osseous abnormality identified. No radiographic evidence of acute cardiopulmonary disease. ED COURSE/MDM  Patient seen and evaluated. Old records reviewed. Diagnostic testing reviewed and results discussed. I have evaluated this patient. My supervising physician was available for consultation. Nga Martínez presented to the ED today with above noted complaints. Physical exam is without adventitious breath sounds on exam. No arrhyhtmia seen on bed side heart monitor. While in ED patient remained hemodynamically stable and without concerns on the heart monitor or EKG. Blood work without evidence of systemic infection, no anemia. No significant electrolyte abnormality. No evidence of acute kidney injury or transaminitis.   Troponin is negative. Magnesium level normal at 2.3. Chest x-ray is without acute findings. I advised the patient that he will likely need to follow-up with cardiology since he has not been seen by them in some time and may have to have a heart monitor to further evaluate his complaints. Patient has apparently been seen by [facility] in the past and referral was given for him to follow-up with them. At this point I do not feel the patient requires further work up and it is reasonable to discharge the patient. Please refer to AVS for further details regarding discharge instructions. A discussion was had with the patient regarding diagnosis, diagnostic testing results, treatment/ plan of care, and follow up. All questions were answered. Patient will follow up as directed for further evaluation/treatment. The patient was given strict return precautions as we discussed symptoms that would necessitate return to the ED. Patient will return to ED for new/worsening symptoms. The patient verbalized their understanding and agreement with the above plan. I estimate there is LOW risk for ACUTE CORONARY SYNDROME, INTRACRANIAL HEMORRHAGE, MALIGNANT DYSRHYTHMIA or HYPERTENSION, PULMONARY EMBOLISM, SEPSIS, SUBARACHNOID HEMORRHAGE, SUBDURAL HEMATOMA, STROKE, or THORACIC AORTIC DISSECTION, thus I consider the discharge disposition reasonable. Venancio Torrez Covert and I have discussed the diagnosis and risks, and we agree with discharging home to follow-up with their primary doctor. We also discussed returning to the Emergency Department immediately if new or worsening symptoms occur. We have discussed the symptoms which are most concerning (e.g., bloody sputum, fever, worsening pain or shortness of breath, vomiting, weakness) that necessitate immediate return. Patient was sent home with a prescription for below medication/s. I did [place] patient on appropriate use of these medication.   Discharge Medication List as of 11/19/2020  1:13 AM          CLINICAL IMPRESSION    1. Palpitations           Discharge Vitals:  Blood pressure 129/71, pulse 81, temperature 98.2 °F (36.8 °C), temperature source Oral, resp. rate 12, SpO2 96 %. FOLLOW UP  Joyce Briggs MD  [address] [facility]  [address] [place] [address] [address] [facility]  [phone]    Call in 1 day  For further evaluation    Yumi Liao MD  [address] [facility] 19 [phone]    Call in 1 day  For further evaluation    [facility] [address]  [phone]  Go to   If symptoms worsen      DISPOSITION  Patient was discharged to home in good condition. Comment: Please note this report has been produced using speech recognition software and may contain errors related to that system including errors in grammar, punctuation, and spelling, as well as words and phrases that may be inappropriate. If there are any questions or concerns please feel free to contact the dictating provider for clarification.        EYAL Marx - CNP  11/19/20 0159

## 2020-11-20 LAB
EKG ATRIAL RATE: 87 BPM
EKG DIAGNOSIS: NORMAL
EKG P AXIS: 45 DEGREES
EKG P-R INTERVAL: 140 MS
EKG Q-T INTERVAL: 342 MS
EKG QRS DURATION: 72 MS
EKG QTC CALCULATION (BAZETT): 411 MS
EKG R AXIS: 54 DEGREES
EKG T AXIS: 35 DEGREES
EKG VENTRICULAR RATE: 87 BPM

## 2020-11-20 PROCEDURE — 93010 ELECTROCARDIOGRAM REPORT: CPT | Performed by: INTERNAL MEDICINE

## 2021-05-25 ENCOUNTER — HOSPITAL ENCOUNTER (EMERGENCY)
Age: 61
Discharge: HOME | End: 2021-05-25
Payer: MEDICARE

## 2021-05-25 ENCOUNTER — APPOINTMENT (OUTPATIENT)
Dept: GENERAL RADIOLOGY | Age: 61
End: 2021-05-25
Payer: MEDICARE

## 2021-05-25 VITALS
RESPIRATION RATE: 16 BRPM | HEART RATE: 88 BPM | DIASTOLIC BLOOD PRESSURE: 99 MMHG | TEMPERATURE: 98.2 F | OXYGEN SATURATION: 99 % | SYSTOLIC BLOOD PRESSURE: 148 MMHG
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
## 2021-05-25 DIAGNOSIS — M25.571 ACUTE RIGHT ANKLE PAIN: ICD-10-CM

## 2021-05-25 PROCEDURE — 99283 EMERGENCY DEPT VISIT LOW MDM: CPT

## 2021-05-25 PROCEDURE — 73610 X-RAY EXAM OF ANKLE: CPT

## 2021-05-25 PROCEDURE — 73560 X-RAY EXAM OF KNEE 1 OR 2: CPT

## 2021-05-25 RX ORDER — ACETAMINOPHEN 325 MG/1
650 TABLET ORAL ONCE
Status: DISCONTINUED | OUTPATIENT
Start: 2021-05-25 | End: 2021-05-26 | Stop reason: HOSPADM

## 2021-05-25 ASSESSMENT — PAIN SCALES - GENERAL: PAINLEVEL_OUTOF10: 9

## 2021-05-25 NOTE — LETTER
Jerry Hampton was seen and treated in our emergency department on 5/25/2021. If you have any questions or concerns, please don't hesitate to call.

## 2021-05-26 NOTE — ED PROVIDER NOTES
[address] [place] [address]  ED  EMERGENCY DEPARTMENT ENCOUNTER      This patient was not seen and evaluated by the attending physician. Pt Name: Dorota Whitmore  MRN: [id number]  [place] 1960  Date of evaluation: 5/25/2021  Provider: EYAL Houston - CNP-C  PCP: Jann Cabral MD      History provided by the patient     CHIEFCOMPLAINT:     Chief Complaint   Patient presents with   Milla Vanita     was running from hornets and fell. c/o right knee and ankle pain       HISTORY OF PRESENT ILLNESS:      Dorota Whitmore is a 62 y.o. male who presents to [address] [place] [address]  ED with complaints of fall, right ankle pain. Patient states that he was mowing today when he ran over some hornets and he started to attack and, states that he was stung twice he started to run away and fell, he has a previous right ankle injury he wants to confirm that he did not reinjure it. He does have some discomfort to the right ankle, he is able to ambulate. There is no obvious deformity. He has no other injuries or complaints. LOCATION:right ankle  QUALITY:ache  SEVERITY:9  DURATION:today  MODIFYING FACTORS:none noted    Nursing Notes were reviewed     REVIEW OF SYSTEMS:     Review of Systems  All systems, a total of 10, are reviewed and negative except for those that were just noted in history present illness.         PAST MEDICAL HISTORY:     Past Medical History:   Diagnosis Date    Anxiety     Clostridium difficile infection 03/23/2020    Clostridium difficile infection 03/23/2020    GERD (gastroesophageal reflux disease)     Hypertension          SURGICAL HISTORY:      Past Surgical History:   Procedure Laterality Date    ANKLE SURGERY Right     HAND SURGERY Left     KNEE SURGERY Right     ROTATOR CUFF REPAIR Right          CURRENT MEDICATIONS:       Discharge Medication List as of 5/25/2021  9:44 PM      CONTINUE these medications which have NOT CHANGED    Details   ondansetron (ZOFRAN) 4 MG tablet Take 1 tablet by mouth every 8 hours as needed for Nausea, Disp-20 tablet, R-0Print      dicyclomine (BENTYL) 10 MG capsule Take 1 capsule by mouth every 6 hours as needed (cramps), Disp-20 capsule, R-0Print      albuterol sulfate  (90 Base) MCG/ACT inhaler Inhale 2 puffs into the lungs every 4 hours as needed for Wheezing, Disp-1 Inhaler, R-0Print      fluticasone (FLONASE) 50 MCG/ACT nasal spray 1 spray by Nasal route daily for 10 days, Disp-1 Bottle, R-0Print      amphetamine-dextroamphetamine (ADDERALL) 30 MG tablet TAKE 1 TABLET BY MOUTH EVERY MORNING AND AFTERNOON, R-0Historical Med      lisinopril (PRINIVIL;ZESTRIL) 40 MG tablet Take 40 mg by mouth daily. aspirin 81 MG tablet Take 81 mg by mouth daily. ALLERGIES:    Patient has no known allergies. FAMILY HISTORY:     History reviewed. No pertinent family history. SOCIAL HISTORY:     Social History     Socioeconomic History    Marital status: Legally      Spouse name: None    Number of children: None    Years of education: None    Highest education level: None   Occupational History    None   Tobacco Use    Smoking status: Current Every Day Smoker     Packs/day: 0.50     Types: Cigarettes    Smokeless tobacco: Never Used   Substance and Sexual Activity    Alcohol use: No    Drug use: No    Sexual activity: None   Other Topics Concern    None   Social History Narrative    None     Social Determinants of Health     Financial Resource Strain:     Difficulty of Paying Living Expenses:    Food Insecurity:     Worried About Running Out of Food in the Last Year:     Ran Out of Food in the Last Year:    Transportation Needs:     Lack of Transportation (Medical):      Lack of Transportation (Non-Medical):    Physical Activity:     Days of Exercise per Week:     Minutes of Exercise per Session:    Stress:     Feeling of Stress :    Social Connections:     Frequency of Communication with Friends and Family:     Frequency of Social Gatherings with Friends and Family:     Attends Congregation Services:     Active Member of Clubs or Organizations:     Attends Club or Organization Meetings:     Marital Status:    Intimate Partner Violence:     Fear of Current or Ex-Partner:     Emotionally Abused:     Physically Abused:     Sexually Abused:        SCREENINGS:             PHYSICAL EXAM:       ED Triage Vitals [05/25/21 2049]   BP Temp Temp Source Pulse Resp SpO2 Height Weight   (!) 191/108 98 °F (36.7 °C) Oral 98 16 96 % -- --       Physical Exam    CONSTITUTIONAL: Awake and alert. Cooperative. Well-developed. Well-nourished. Non-toxic. No acute distress. Vitals:    05/25/21 2049 05/25/21 2201   BP: (!) 191/108 (!) 148/99   Pulse: 98 88   Resp: 16 16   Temp: 98 °F (36.7 °C) 98.2 °F (36.8 °C)   TempSrc: Oral Oral   SpO2: 96% 99%     HENT: Normocephalic. Atraumatic. External ears normal, without discharge. Nonasal discharge. Mucous membranes moist.  EYES: Conjunctiva non-injected, no lid abnormalities noted. No scleral icterus. EOM's grossly intact. Anterior chambers clear. NECK: Supple. Normal ROM. No meningismus. No thyroid tenderness or swelling noted. CARDIOVASCULAR: no tachycardia per vital signs. PULMONARY/CHEST WALL: Effort normal. No tachypnea. No audible adventitious breath sounds. ABDOMEN: No obvious abdominal distention, no obvious hernias. Back: Spine is midline. No obvious trauma or outward signs of cauda equina  /ANORECTAL: Not assessed  MUSKULOSKELETAL: Normal ROM. No acute deformities. Slight erythema to right wrist with small amount of edema where stings are. There are surgical scars to right ankle with some mild global tenderness. No joint laxity. SKIN: Warm and dry. NEUROLOGICAL:  GCS 15. No obvious focal neurological deficits. PSYCHIATRIC: Normal affect, normal insight and judgement. Alert and oriented x 3.         DIAGNOSTIC RESULTS:     LABS:    No results found for this visit on 05/25/21. RADIOLOGY:  All x-ray studies are viewed/reviewed by me. Formal interpretations per the radiologist are as follows:      XR ANKLE RIGHT (MIN 3 VIEWS)   Final Result   Remote posttraumatic changes and subtalar arthrodesis without convincing   radiographic evidence of an acute bony abnormality or hardware complication. If clinical concern persists, CT is more sensitive. XR KNEE RIGHT (1-2 VIEWS)   Final Result   Suboptimal positioning. No displaced fracture given limitation. Questionable effusion on suboptimal lateral view. EKG:  See EKG interpretation by an attending physician. PROCEDURES:   N/A    CRITICAL CARE TIME:   N/A    CONSULTS:  None      EMERGENCY DEPARTMENT COURSE andDIFFERENTIAL DIAGNOSIS/MDM:   Vitals:    Vitals:    05/25/21 2049 05/25/21 2201   BP: (!) 191/108 (!) 148/99   Pulse: 98 88   Resp: 16 16   Temp: 98 °F (36.7 °C) 98.2 °F (36.8 °C)   TempSrc: Oral Oral   SpO2: 96% 99%       Patient wasgiven the following medications:  Medications - No data to display      Patient was evaluated independently by myself with the attending physician available for consultation. Patient presented to the emergency room today with complaints of right ankle pain after fall. He was also stung by hornets on the right wrist, he states that there is some swelling in the area. There is no signs of infection this just happened today does just appear to be local irritation from hemianopsia staying, patient had surgical scars to the right ankle from an old previous injury but no evidence of an acute fracture based on radiographic imaging. Patient strict to rest and ice. He was discharged home, he can use Benadryl as needed for irritation from the bee sting. Patient laboratory studies, radiographic imaging, and assessment were all discussed with the patient and/orpatient family.   There was shared decision-making between myself as well as the patient and/or their surrogate and we are all in agreement with discharge home. There was an opportunity for questions and all questions were answered tothe best of my ability and to the satisfaction of the patient and/or patient family. FINAL IMPRESSION:      1. Hymenoptera sting, accidental or unintentional, initial encounter    2. Fall, initial encounter    3.  Acute right ankle pain          DISPOSITION/PLAN:   DISPOSITION Decision To Discharge      PATIENT REFERRED TO:  Joaquin Rice MD  [address] [place]   [phone]    Call   For follow up      DISCHARGE MEDICATIONS:  Discharge Medication List as of 5/25/2021  9:44 PM                     (Please note thatportions of this note were completed with a voice recognition program.  Efforts were made to edit the dictations, but occasionally words are mis-transcribed.)    EYAL Ma CNP-C (electronicallysigned)      EYAL Ma CNP  05/26/21 1924

## 2021-07-23 ENCOUNTER — HOSPITAL ENCOUNTER (EMERGENCY)
Age: 61
Discharge: HOME | End: 2021-07-23
Payer: MEDICARE

## 2021-07-23 VITALS
BODY MASS INDEX: 37.89 KG/M2 | DIASTOLIC BLOOD PRESSURE: 96 MMHG | WEIGHT: 250 LBS | HEIGHT: 68 IN | TEMPERATURE: 98.8 F | SYSTOLIC BLOOD PRESSURE: 146 MMHG | OXYGEN SATURATION: 97 % | HEART RATE: 92 BPM | RESPIRATION RATE: 16 BRPM

## 2021-07-23 DIAGNOSIS — E86.0 DEHYDRATION: Primary | ICD-10-CM

## 2021-07-23 LAB
A/G RATIO: 1.4 (ref 1.1–2.2)
ALBUMIN SERPL-MCNC: 4.2 G/DL (ref 3.4–5)
ALP BLD-CCNC: 116 U/L (ref 40–129)
ALT SERPL-CCNC: 23 U/L (ref 10–40)
ANION GAP SERPL CALCULATED.3IONS-SCNC: 12 MMOL/L (ref 3–16)
AST SERPL-CCNC: 18 U/L (ref 15–37)
BASOPHILS ABSOLUTE: 0.1 K/UL (ref 0–0.2)
BASOPHILS RELATIVE PERCENT: 1 %
BILIRUB SERPL-MCNC: 0.4 MG/DL (ref 0–1)
BILIRUBIN URINE: ABNORMAL
BLOOD, URINE: NEGATIVE
BUN BLDV-MCNC: 20 MG/DL (ref 7–20)
CALCIUM SERPL-MCNC: 9.8 MG/DL (ref 8.3–10.6)
CHLORIDE BLD-SCNC: 102 MMOL/L (ref 99–110)
CLARITY: ABNORMAL
CO2: 26 MMOL/L (ref 21–32)
COLOR: YELLOW
CREAT SERPL-MCNC: 0.7 MG/DL (ref 0.9–1.3)
CRYSTALS, UA: ABNORMAL /HPF
EOSINOPHILS ABSOLUTE: 0.2 K/UL (ref 0–0.6)
EOSINOPHILS RELATIVE PERCENT: 2 %
EPITHELIAL CELLS, UA: ABNORMAL /HPF (ref 0–5)
GFR AFRICAN AMERICAN: >60
GFR NON-AFRICAN AMERICAN: >60
GLOBULIN: 3 G/DL
GLUCOSE BLD-MCNC: 102 MG/DL (ref 70–99)
GLUCOSE URINE: NEGATIVE MG/DL
HCT VFR BLD CALC: 45.6 % (ref 40.5–52.5)
HEMOGLOBIN: 15.5 G/DL (ref 13.5–17.5)
KETONES, URINE: 15 MG/DL
LEUKOCYTE ESTERASE, URINE: NEGATIVE
LYMPHOCYTES ABSOLUTE: 2.7 K/UL (ref 1–5.1)
LYMPHOCYTES RELATIVE PERCENT: 30.9 %
MCH RBC QN AUTO: 30.6 PG (ref 26–34)
MCHC RBC AUTO-ENTMCNC: 34 G/DL (ref 31–36)
MCV RBC AUTO: 89.9 FL (ref 80–100)
MICROSCOPIC EXAMINATION: YES
MONOCYTES ABSOLUTE: 0.8 K/UL (ref 0–1.3)
MONOCYTES RELATIVE PERCENT: 8.8 %
MUCUS: ABNORMAL /LPF
NEUTROPHILS ABSOLUTE: 5.1 K/UL (ref 1.7–7.7)
NEUTROPHILS RELATIVE PERCENT: 57.3 %
NITRITE, URINE: NEGATIVE
PDW BLD-RTO: 13.9 % (ref 12.4–15.4)
PH UA: 5.5 (ref 5–8)
PLATELET # BLD: 253 K/UL (ref 135–450)
PMV BLD AUTO: 8.8 FL (ref 5–10.5)
POTASSIUM SERPL-SCNC: 4.2 MMOL/L (ref 3.5–5.1)
PROTEIN UA: 30 MG/DL
RBC # BLD: 5.07 M/UL (ref 4.2–5.9)
RBC UA: ABNORMAL /HPF (ref 0–4)
SODIUM BLD-SCNC: 140 MMOL/L (ref 136–145)
SPECIFIC GRAVITY UA: >=1.03 (ref 1–1.03)
TOTAL PROTEIN: 7.2 G/DL (ref 6.4–8.2)
TROPONIN: <0.01 NG/ML
URINE TYPE: ABNORMAL
UROBILINOGEN, URINE: 1 E.U./DL
WBC # BLD: 8.8 K/UL (ref 4–11)
WBC UA: ABNORMAL /HPF (ref 0–5)

## 2021-07-23 PROCEDURE — 85025 COMPLETE CBC W/AUTO DIFF WBC: CPT

## 2021-07-23 PROCEDURE — 99283 EMERGENCY DEPT VISIT LOW MDM: CPT

## 2021-07-23 PROCEDURE — 96360 HYDRATION IV INFUSION INIT: CPT

## 2021-07-23 PROCEDURE — 2580000003 HC RX 258: Performed by: PHYSICIAN ASSISTANT

## 2021-07-23 PROCEDURE — 84484 ASSAY OF TROPONIN QUANT: CPT

## 2021-07-23 PROCEDURE — 80053 COMPREHEN METABOLIC PANEL: CPT

## 2021-07-23 PROCEDURE — 81001 URINALYSIS AUTO W/SCOPE: CPT

## 2021-07-23 PROCEDURE — 93005 ELECTROCARDIOGRAM TRACING: CPT | Performed by: PHYSICIAN ASSISTANT

## 2021-07-23 RX ORDER — 0.9 % SODIUM CHLORIDE 0.9 %
1000 INTRAVENOUS SOLUTION INTRAVENOUS ONCE
Status: COMPLETED | OUTPATIENT
Start: 2021-07-23 | End: 2021-07-23

## 2021-07-23 RX ADMIN — SODIUM CHLORIDE 1000 ML: 9 INJECTION, SOLUTION INTRAVENOUS at 17:49

## 2021-07-23 ASSESSMENT — ENCOUNTER SYMPTOMS
GASTROINTESTINAL NEGATIVE: 1
RESPIRATORY NEGATIVE: 1

## 2021-07-23 NOTE — LETTER
[facility]  [address] [facility] [address]  Phone: [phone]               July 23, 2021    Patient: Mendez Jaimes Covert   YOB: 1960   Date of Visit: 7/23/2021       To Whom It May Concern:    Dinah Sorensen was seen and treated in our emergency department on 7/23/2021. He may return to work on 7/24/21.       Sincerely,       Robinson Cadena RN         Signature:__________________________________

## 2021-07-23 NOTE — ED PROVIDER NOTES
Gastrointestinal: Negative. Genitourinary: Negative. Musculoskeletal: Negative. Skin: Negative. Neurological: Negative. Positives and Pertinent negatives as per HPI. Except as noted above in the ROS, all other systems were reviewed and negative. PAST MEDICAL HISTORY     Past Medical History:   Diagnosis Date    Anxiety     Clostridium difficile infection 03/23/2020    Clostridium difficile infection 03/23/2020    GERD (gastroesophageal reflux disease)     Hypertension          SURGICAL HISTORY     Past Surgical History:   Procedure Laterality Date    ABDOMEN SURGERY      ANKLE SURGERY Right     HAND SURGERY Left     KNEE SURGERY Right     ROTATOR CUFF REPAIR Right          CURRENTMEDICATIONS       Previous Medications    ALBUTEROL SULFATE  (90 BASE) MCG/ACT INHALER    Inhale 2 puffs into the lungs every 4 hours as needed for Wheezing    AMPHETAMINE-DEXTROAMPHETAMINE (ADDERALL) 30 MG TABLET    TAKE 1 TABLET BY MOUTH EVERY MORNING AND AFTERNOON    ASPIRIN 81 MG TABLET    Take 81 mg by mouth daily. DICYCLOMINE (BENTYL) 10 MG CAPSULE    Take 1 capsule by mouth every 6 hours as needed (cramps)    FLUTICASONE (FLONASE) 50 MCG/ACT NASAL SPRAY    1 spray by Nasal route daily for 10 days    LISINOPRIL (PRINIVIL;ZESTRIL) 40 MG TABLET    Take 40 mg by mouth daily. ONDANSETRON (ZOFRAN) 4 MG TABLET    Take 1 tablet by mouth every 8 hours as needed for Nausea         ALLERGIES     Patient has no known allergies. FAMILYHISTORY     History reviewed. No pertinent family history.        SOCIAL HISTORY       Social History     Tobacco Use    Smoking status: Current Every Day Smoker     Packs/day: 0.50     Types: Cigarettes    Smokeless tobacco: Never Used   Vaping Use    Vaping Use: Never used   Substance Use Topics    Alcohol use: No    Drug use: No       SCREENINGS             PHYSICAL EXAM    (up to 7 for level 4, 8 or more for level 5)     ED Triage Vitals   BP Temp Temp Source Pulse Resp SpO2 Height Weight   07/23/21 1631 07/23/21 1629 07/23/21 1629 07/23/21 1631 07/23/21 1629 07/23/21 1631 07/23/21 1710 07/23/21 1629   (!) 158/101 98.8 °F (37.1 °C) Oral 102 18 97 % 5' 8\" (1.727 m) 250 lb (113.4 kg)       Physical Exam  Vitals and nursing note reviewed. Constitutional:       General: He is awake. He is not in acute distress. Appearance: Normal appearance. He is well-developed. He is not ill-appearing, toxic-appearing or diaphoretic. HENT:      Head: Normocephalic and atraumatic. Nose: Nose normal.      Mouth/Throat:      Lips: Pink. Mouth: Mucous membranes are dry. Eyes:      General:         Right eye: No discharge. Left eye: No discharge. Cardiovascular:      Rate and Rhythm: Normal rate and regular rhythm. Pulses:           Radial pulses are 2+ on the right side and 2+ on the left side. Heart sounds: Normal heart sounds. No murmur heard. No gallop. Pulmonary:      Effort: Pulmonary effort is normal. No respiratory distress. Breath sounds: Normal breath sounds. No decreased breath sounds, wheezing, rhonchi or rales. Chest:      Chest wall: No tenderness. Musculoskeletal:         General: No deformity. Normal range of motion. Cervical back: Normal range of motion and neck supple. Right lower leg: No edema. Left lower leg: No edema. Skin:     General: Skin is warm and dry. Neurological:      General: No focal deficit present. Mental Status: He is alert and oriented to person, place, and time. GCS: GCS eye subscore is 4. GCS verbal subscore is 5. GCS motor subscore is 6. Cranial Nerves: Cranial nerves are intact. Sensory: Sensation is intact. No sensory deficit. Motor: Motor function is intact. No weakness. Coordination: Coordination is intact. Romberg sign negative. Coordination normal. Finger-Nose-Finger Test and Heel to [facility] Test normal.      Gait: Gait is intact.    Psychiatric: findings:        Interpretation per the Radiologist below, if available at the time of this note:    No orders to display     No results found. PROCEDURES   Unless otherwise noted below, none     Procedures    CRITICAL CARE TIME   N/A    CONSULTS:  None      EMERGENCY DEPARTMENT COURSE and DIFFERENTIAL DIAGNOSIS/MDM:   Vitals:    Vitals:    07/23/21 1745 07/23/21 1800 07/23/21 1815 07/23/21 1830   BP: (!) 132/92 126/87  (!) 135/91   Pulse: 95 89  90   Resp: 16 13  22   Temp:       TempSrc:       SpO2: 96% 96% 97% 99%   Weight:       Height:           Patient was given the following medications:  Medications   0.9 % sodium chloride bolus (1,000 mLs Intravenous New Bag 7/23/21 1749)           Patient brought in today by private vehicle with concerns for dehydration. On exam alert oriented afebrile breathing on room air satting at 96%. Nontoxic. No acute respiratory distress. Old labs and records reviewed. Patient seen and evaluated by myself and my attending was available for consultation. CBC shows no acute leukocytosis. Hemoglobin of 15.5. No acute electrolyte abnormalities. Kidney function unremarkable. Urine is negative for infection. EKG reviewed by my attending see note for dictation. Troponin less than 0.01. Patient received a liter of fluids here. Orthostatic vitals were obtained as well. Patient does not appear to be orthostatic. Plan at this time will be to discharge home with close follow-up to PCP. Instructed to return immediately to the ED with any new or worsening symptoms including but not limited to chest pain shortness of breath numbness or tingling intractable nausea or vomiting diarrhea abdominal pain or any new or changing symptoms. He verbalized understanding of this plan was comfortable and stable at time of discharge. I did feel comfortable sending this patient home with close follow-up instructions and strict return precautions.   Patient was discharged in stable condition. FINAL IMPRESSION      1.  Dehydration          DISPOSITION/PLAN   DISPOSITION        PATIENT REFERRED TO:  Magdy Maharaj [address] [address]  [phone]    Schedule an appointment as soon as possible for a visit in 1 day  For a recheck in  days    [facility]  [address] [address]  [phone]  Schedule an appointment as soon as possible for a visit   As needed, If symptoms worsen      DISCHARGE MEDICATIONS:  New Prescriptions    No medications on file       DISCONTINUED MEDICATIONS:  Discontinued Medications    No medications on file              (Please note that portions of this note were completed with a voice recognition program.  Efforts were made to edit the dictations but occasionally words are mis-transcribed.)    Shavon Rosenthal PA-C (electronically signed)            Shavon Rosenthal PA-C  07/23/21 1918

## 2021-07-24 LAB
EKG ATRIAL RATE: 94 BPM
EKG DIAGNOSIS: NORMAL
EKG P AXIS: 54 DEGREES
EKG P-R INTERVAL: 152 MS
EKG Q-T INTERVAL: 344 MS
EKG QRS DURATION: 82 MS
EKG QTC CALCULATION (BAZETT): 430 MS
EKG R AXIS: 52 DEGREES
EKG T AXIS: 50 DEGREES
EKG VENTRICULAR RATE: 94 BPM

## 2021-07-24 PROCEDURE — 93010 ELECTROCARDIOGRAM REPORT: CPT | Performed by: INTERNAL MEDICINE

## 2022-04-18 ENCOUNTER — APPOINTMENT (OUTPATIENT)
Dept: CT IMAGING | Age: 62
End: 2022-04-18
Payer: OTHER MISCELLANEOUS

## 2022-04-18 ENCOUNTER — HOSPITAL ENCOUNTER (EMERGENCY)
Age: 62
Discharge: HOME | End: 2022-04-18
Payer: OTHER MISCELLANEOUS

## 2022-04-18 ENCOUNTER — APPOINTMENT (OUTPATIENT)
Dept: GENERAL RADIOLOGY | Age: 62
End: 2022-04-18
Payer: OTHER MISCELLANEOUS

## 2022-04-18 VITALS
SYSTOLIC BLOOD PRESSURE: 148 MMHG | HEIGHT: 68 IN | DIASTOLIC BLOOD PRESSURE: 82 MMHG | WEIGHT: 160 LBS | HEART RATE: 70 BPM | TEMPERATURE: 98.1 F | BODY MASS INDEX: 24.25 KG/M2 | RESPIRATION RATE: 16 BRPM | OXYGEN SATURATION: 98 %

## 2022-04-18 DIAGNOSIS — M25.562 ACUTE PAIN OF LEFT KNEE: ICD-10-CM

## 2022-04-18 DIAGNOSIS — S39.012A STRAIN OF LUMBAR REGION, INITIAL ENCOUNTER: Primary | ICD-10-CM

## 2022-04-18 PROCEDURE — 6370000000 HC RX 637 (ALT 250 FOR IP): Performed by: NURSE PRACTITIONER

## 2022-04-18 PROCEDURE — 73562 X-RAY EXAM OF KNEE 3: CPT

## 2022-04-18 PROCEDURE — 99284 EMERGENCY DEPT VISIT MOD MDM: CPT

## 2022-04-18 PROCEDURE — 72131 CT LUMBAR SPINE W/O DYE: CPT

## 2022-04-18 RX ORDER — IBUPROFEN 600 MG/1
600 TABLET ORAL ONCE
Status: COMPLETED | OUTPATIENT
Start: 2022-04-18 | End: 2022-04-18

## 2022-04-18 RX ORDER — NAPROXEN 250 MG/1
250 TABLET ORAL 2 TIMES DAILY PRN
Qty: 60 TABLET | Refills: 0 | Status: SHIPPED | OUTPATIENT
Start: 2022-04-18

## 2022-04-18 RX ADMIN — IBUPROFEN 600 MG: 600 TABLET ORAL at 14:27

## 2022-04-18 ASSESSMENT — ENCOUNTER SYMPTOMS
EYE PAIN: 0
BACK PAIN: 1
ABDOMINAL PAIN: 0
SORE THROAT: 0
SHORTNESS OF BREATH: 0
BLOOD IN STOOL: 0
NAUSEA: 0
DIARRHEA: 0
COUGH: 0
RHINORRHEA: 0
VOMITING: 0

## 2022-04-18 ASSESSMENT — PAIN DESCRIPTION - LOCATION: LOCATION: BACK

## 2022-04-18 ASSESSMENT — PAIN SCALES - GENERAL
PAINLEVEL_OUTOF10: 10
PAINLEVEL_OUTOF10: 9

## 2022-04-18 ASSESSMENT — PAIN DESCRIPTION - PAIN TYPE: TYPE: ACUTE PAIN

## 2022-04-18 ASSESSMENT — PAIN - FUNCTIONAL ASSESSMENT: PAIN_FUNCTIONAL_ASSESSMENT: 0-10

## 2022-04-18 NOTE — ED NOTES
Patient discharged in stable, ambulatory condition with all documented belongings. This nurse reviewed discharge instructions, pt verbalized understanding.        Hank Chaney RN  04/18/22 3701

## 2022-04-18 NOTE — Clinical Note
Walker New was seen and treated in our emergency department on 4/18/2022. He may return to work on 04/20/2022. If you have any questions or concerns, please don't hesitate to call.       Ellen Zaragoza, APRN - CNP
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
## 2023-01-07 ENCOUNTER — APPOINTMENT (OUTPATIENT)
Dept: GENERAL RADIOLOGY | Age: 63
DRG: 871 | End: 2023-01-07
Payer: MEDICARE

## 2023-01-07 ENCOUNTER — HOSPITAL ENCOUNTER (INPATIENT)
Age: 63
LOS: 11 days | Discharge: HOME HEALTH | DRG: 871 | End: 2023-01-18
Attending: INTERNAL MEDICINE | Admitting: INTERNAL MEDICINE
Payer: MEDICARE

## 2023-01-07 DIAGNOSIS — J18.9 PNEUMONIA DUE TO INFECTIOUS ORGANISM, UNSPECIFIED LATERALITY, UNSPECIFIED PART OF LUNG: ICD-10-CM

## 2023-01-07 DIAGNOSIS — R07.81 PLEURITIC CHEST PAIN: ICD-10-CM

## 2023-01-07 DIAGNOSIS — R78.81 BACTEREMIA DUE TO STREPTOCOCCUS PNEUMONIAE: ICD-10-CM

## 2023-01-07 DIAGNOSIS — B95.3 BACTEREMIA DUE TO STREPTOCOCCUS PNEUMONIAE: ICD-10-CM

## 2023-01-07 DIAGNOSIS — R09.02 HYPOXIA: ICD-10-CM

## 2023-01-07 DIAGNOSIS — A41.9 SEPTICEMIA (HCC): Primary | ICD-10-CM

## 2023-01-07 PROBLEM — F90.9 ADHD: Status: ACTIVE | Noted: 2023-01-07

## 2023-01-07 PROBLEM — E11.9 DM2 (DIABETES MELLITUS, TYPE 2) (HCC): Status: ACTIVE | Noted: 2023-01-07

## 2023-01-07 PROBLEM — Z72.0 TOBACCO ABUSE: Status: ACTIVE | Noted: 2023-01-07

## 2023-01-07 PROBLEM — Z90.3 HISTORY OF SLEEVE GASTRECTOMY: Status: ACTIVE | Noted: 2023-01-07

## 2023-01-07 LAB
ANION GAP SERPL CALCULATED.3IONS-SCNC: 10 MMOL/L (ref 3–16)
ANISOCYTOSIS: ABNORMAL
BANDED NEUTROPHILS RELATIVE PERCENT: 24 % (ref 0–7)
BASOPHILS ABSOLUTE: 0 K/UL (ref 0–0.2)
BASOPHILS RELATIVE PERCENT: 0 %
BUN BLDV-MCNC: 22 MG/DL (ref 7–20)
CALCIUM SERPL-MCNC: 8.3 MG/DL (ref 8.3–10.6)
CHLORIDE BLD-SCNC: 98 MMOL/L (ref 99–110)
CO2: 25 MMOL/L (ref 21–32)
CREAT SERPL-MCNC: <0.5 MG/DL (ref 0.8–1.3)
EOSINOPHILS ABSOLUTE: 0 K/UL (ref 0–0.6)
EOSINOPHILS RELATIVE PERCENT: 0 %
GFR SERPL CREATININE-BSD FRML MDRD: >60 ML/MIN/{1.73_M2}
GLUCOSE BLD-MCNC: 136 MG/DL (ref 70–99)
HCT VFR BLD CALC: 34.2 % (ref 40.5–52.5)
HEMOGLOBIN: 11.4 G/DL (ref 13.5–17.5)
INFLUENZA A: NOT DETECTED
INFLUENZA B: NOT DETECTED
INR BLD: 1.32 (ref 0.87–1.14)
LACTIC ACID, SEPSIS: 1.8 MMOL/L (ref 0.4–1.9)
LYMPHOCYTES ABSOLUTE: 0.5 K/UL (ref 1–5.1)
LYMPHOCYTES RELATIVE PERCENT: 3 %
MCH RBC QN AUTO: 30.4 PG (ref 26–34)
MCHC RBC AUTO-ENTMCNC: 33.3 G/DL (ref 31–36)
MCV RBC AUTO: 91.4 FL (ref 80–100)
METAMYELOCYTES RELATIVE PERCENT: 1 %
MONOCYTES ABSOLUTE: 0.2 K/UL (ref 0–1.3)
MONOCYTES RELATIVE PERCENT: 1 %
NEUTROPHILS ABSOLUTE: 16.2 K/UL (ref 1.7–7.7)
NEUTROPHILS RELATIVE PERCENT: 71 %
PDW BLD-RTO: 14.5 % (ref 12.4–15.4)
PLATELET # BLD: 490 K/UL (ref 135–450)
PLATELET SLIDE REVIEW: ABNORMAL
PMV BLD AUTO: 7.8 FL (ref 5–10.5)
POIKILOCYTES: ABNORMAL
POTASSIUM SERPL-SCNC: 4.9 MMOL/L (ref 3.5–5.1)
PRO-BNP: 1840 PG/ML (ref 0–124)
PROCALCITONIN: 2.96 NG/ML (ref 0–0.15)
PROTHROMBIN TIME: 16.4 SEC (ref 11.7–14.5)
RBC # BLD: 3.75 M/UL (ref 4.2–5.9)
SARS-COV-2 RNA, RT PCR: NOT DETECTED
SLIDE REVIEW: ABNORMAL
SODIUM BLD-SCNC: 133 MMOL/L (ref 136–145)
TOXIC GRANULATION: PRESENT
TROPONIN: <0.01 NG/ML
VACUOLATED NEUTROPHILS: PRESENT
WBC # BLD: 16.9 K/UL (ref 4–11)

## 2023-01-07 PROCEDURE — 87186 SC STD MICRODIL/AGAR DIL: CPT

## 2023-01-07 PROCEDURE — 84145 PROCALCITONIN (PCT): CPT

## 2023-01-07 PROCEDURE — 96374 THER/PROPH/DIAG INJ IV PUSH: CPT

## 2023-01-07 PROCEDURE — 6370000000 HC RX 637 (ALT 250 FOR IP): Performed by: INTERNAL MEDICINE

## 2023-01-07 PROCEDURE — 2580000003 HC RX 258: Performed by: PHYSICIAN ASSISTANT

## 2023-01-07 PROCEDURE — 83986 ASSAY PH BODY FLUID NOS: CPT

## 2023-01-07 PROCEDURE — 0W993ZZ DRAINAGE OF RIGHT PLEURAL CAVITY, PERCUTANEOUS APPROACH: ICD-10-PCS | Performed by: INTERNAL MEDICINE

## 2023-01-07 PROCEDURE — 87070 CULTURE OTHR SPECIMN AEROBIC: CPT

## 2023-01-07 PROCEDURE — 71045 X-RAY EXAM CHEST 1 VIEW: CPT

## 2023-01-07 PROCEDURE — 83615 LACTATE (LD) (LDH) ENZYME: CPT

## 2023-01-07 PROCEDURE — 89051 BODY FLUID CELL COUNT: CPT

## 2023-01-07 PROCEDURE — 96375 TX/PRO/DX INJ NEW DRUG ADDON: CPT

## 2023-01-07 PROCEDURE — 83605 ASSAY OF LACTIC ACID: CPT

## 2023-01-07 PROCEDURE — 84484 ASSAY OF TROPONIN QUANT: CPT

## 2023-01-07 PROCEDURE — 83880 ASSAY OF NATRIURETIC PEPTIDE: CPT

## 2023-01-07 PROCEDURE — 80048 BASIC METABOLIC PNL TOTAL CA: CPT

## 2023-01-07 PROCEDURE — 85610 PROTHROMBIN TIME: CPT

## 2023-01-07 PROCEDURE — 87040 BLOOD CULTURE FOR BACTERIA: CPT

## 2023-01-07 PROCEDURE — 87636 SARSCOV2 & INF A&B AMP PRB: CPT

## 2023-01-07 PROCEDURE — 87077 CULTURE AEROBIC IDENTIFY: CPT

## 2023-01-07 PROCEDURE — 87205 SMEAR GRAM STAIN: CPT

## 2023-01-07 PROCEDURE — 87150 DNA/RNA AMPLIFIED PROBE: CPT

## 2023-01-07 PROCEDURE — 2580000003 HC RX 258: Performed by: INTERNAL MEDICINE

## 2023-01-07 PROCEDURE — 85025 COMPLETE CBC W/AUTO DIFF WBC: CPT

## 2023-01-07 PROCEDURE — 1200000000 HC SEMI PRIVATE

## 2023-01-07 PROCEDURE — 94761 N-INVAS EAR/PLS OXIMETRY MLT: CPT

## 2023-01-07 PROCEDURE — 84157 ASSAY OF PROTEIN OTHER: CPT

## 2023-01-07 PROCEDURE — 6360000002 HC RX W HCPCS: Performed by: INTERNAL MEDICINE

## 2023-01-07 PROCEDURE — 2700000000 HC OXYGEN THERAPY PER DAY

## 2023-01-07 PROCEDURE — 6360000002 HC RX W HCPCS: Performed by: PHYSICIAN ASSISTANT

## 2023-01-07 PROCEDURE — 93005 ELECTROCARDIOGRAM TRACING: CPT | Performed by: INTERNAL MEDICINE

## 2023-01-07 PROCEDURE — 99285 EMERGENCY DEPT VISIT HI MDM: CPT

## 2023-01-07 RX ORDER — BENZONATATE 100 MG/1
200 CAPSULE ORAL 3 TIMES DAILY PRN
Status: DISCONTINUED | OUTPATIENT
Start: 2023-01-07 | End: 2023-01-18 | Stop reason: HOSPADM

## 2023-01-07 RX ORDER — ALBUTEROL SULFATE 2.5 MG/3ML
2.5 SOLUTION RESPIRATORY (INHALATION) EVERY 4 HOURS PRN
Status: DISCONTINUED | OUTPATIENT
Start: 2023-01-07 | End: 2023-01-18 | Stop reason: HOSPADM

## 2023-01-07 RX ORDER — IPRATROPIUM BROMIDE AND ALBUTEROL SULFATE 2.5; .5 MG/3ML; MG/3ML
1 SOLUTION RESPIRATORY (INHALATION) 4 TIMES DAILY
Status: DISCONTINUED | OUTPATIENT
Start: 2023-01-07 | End: 2023-01-08

## 2023-01-07 RX ORDER — MORPHINE SULFATE 4 MG/ML
4 INJECTION, SOLUTION INTRAMUSCULAR; INTRAVENOUS ONCE
Status: COMPLETED | OUTPATIENT
Start: 2023-01-07 | End: 2023-01-07

## 2023-01-07 RX ORDER — SODIUM CHLORIDE, SODIUM LACTATE, POTASSIUM CHLORIDE, AND CALCIUM CHLORIDE .6; .31; .03; .02 G/100ML; G/100ML; G/100ML; G/100ML
1000 INJECTION, SOLUTION INTRAVENOUS ONCE
Status: COMPLETED | OUTPATIENT
Start: 2023-01-07 | End: 2023-01-08

## 2023-01-07 RX ORDER — METHYLPREDNISOLONE SODIUM SUCCINATE 40 MG/ML
40 INJECTION, POWDER, LYOPHILIZED, FOR SOLUTION INTRAMUSCULAR; INTRAVENOUS EVERY 12 HOURS
Status: DISCONTINUED | OUTPATIENT
Start: 2023-01-07 | End: 2023-01-09

## 2023-01-07 RX ADMIN — HYDROMORPHONE HYDROCHLORIDE 1 MG: 1 INJECTION, SOLUTION INTRAMUSCULAR; INTRAVENOUS; SUBCUTANEOUS at 23:46

## 2023-01-07 RX ADMIN — METHYLPREDNISOLONE SODIUM SUCCINATE 40 MG: 40 INJECTION, POWDER, FOR SOLUTION INTRAMUSCULAR; INTRAVENOUS at 23:46

## 2023-01-07 RX ADMIN — CEFTRIAXONE SODIUM 1000 MG: 1 INJECTION, POWDER, FOR SOLUTION INTRAMUSCULAR; INTRAVENOUS at 22:34

## 2023-01-07 RX ADMIN — AZITHROMYCIN 500 MG: 500 INJECTION, POWDER, LYOPHILIZED, FOR SOLUTION INTRAVENOUS at 23:31

## 2023-01-07 RX ADMIN — SODIUM CHLORIDE, POTASSIUM CHLORIDE, SODIUM LACTATE AND CALCIUM CHLORIDE 1000 ML: 600; 310; 30; 20 INJECTION, SOLUTION INTRAVENOUS at 22:57

## 2023-01-07 RX ADMIN — BENZONATATE 200 MG: 100 CAPSULE ORAL at 23:46

## 2023-01-07 RX ADMIN — MORPHINE SULFATE 4 MG: 4 INJECTION, SOLUTION INTRAMUSCULAR; INTRAVENOUS at 23:00

## 2023-01-07 ASSESSMENT — PAIN SCALES - GENERAL
PAINLEVEL_OUTOF10: 8
PAINLEVEL_OUTOF10: 10
PAINLEVEL_OUTOF10: 10

## 2023-01-07 ASSESSMENT — PAIN DESCRIPTION - DESCRIPTORS: DESCRIPTORS: ACHING

## 2023-01-07 ASSESSMENT — PAIN DESCRIPTION - ORIENTATION: ORIENTATION: RIGHT;LEFT

## 2023-01-07 ASSESSMENT — PAIN DESCRIPTION - LOCATION: LOCATION: CHEST

## 2023-01-07 ASSESSMENT — PAIN - FUNCTIONAL ASSESSMENT: PAIN_FUNCTIONAL_ASSESSMENT: 0-10

## 2023-01-08 ENCOUNTER — APPOINTMENT (OUTPATIENT)
Dept: CT IMAGING | Age: 63
DRG: 871 | End: 2023-01-08
Payer: MEDICARE

## 2023-01-08 LAB
ANION GAP SERPL CALCULATED.3IONS-SCNC: 12 MMOL/L (ref 3–16)
APPEARANCE FLUID: NORMAL
BASOPHILS ABSOLUTE: 0.1 K/UL (ref 0–0.2)
BASOPHILS RELATIVE PERCENT: 0.3 %
BUN BLDV-MCNC: 24 MG/DL (ref 7–20)
CALCIUM SERPL-MCNC: 9 MG/DL (ref 8.3–10.6)
CELL COUNT FLUID TYPE: NORMAL
CHLORIDE BLD-SCNC: 98 MMOL/L (ref 99–110)
CLOT EVALUATION: NORMAL
CO2: 26 MMOL/L (ref 21–32)
COLOR FLUID: YELLOW
CREAT SERPL-MCNC: <0.5 MG/DL (ref 0.8–1.3)
EKG ATRIAL RATE: 110 BPM
EKG ATRIAL RATE: 357 BPM
EKG DIAGNOSIS: NORMAL
EKG DIAGNOSIS: NORMAL
EKG P AXIS: 37 DEGREES
EKG P-R INTERVAL: 128 MS
EKG Q-T INTERVAL: 302 MS
EKG Q-T INTERVAL: 306 MS
EKG QRS DURATION: 80 MS
EKG QRS DURATION: 90 MS
EKG QTC CALCULATION (BAZETT): 414 MS
EKG QTC CALCULATION (BAZETT): 454 MS
EKG R AXIS: 37 DEGREES
EKG R AXIS: 39 DEGREES
EKG T AXIS: 22 DEGREES
EKG T AXIS: 48 DEGREES
EKG VENTRICULAR RATE: 110 BPM
EKG VENTRICULAR RATE: 136 BPM
EOSINOPHILS ABSOLUTE: 0 K/UL (ref 0–0.6)
EOSINOPHILS RELATIVE PERCENT: 0 %
FLUID PATH CONSULT: YES
FLUID TYPE: NORMAL
GFR SERPL CREATININE-BSD FRML MDRD: >60 ML/MIN/{1.73_M2}
GLUCOSE BLD-MCNC: 159 MG/DL (ref 70–99)
GLUCOSE BLD-MCNC: 166 MG/DL (ref 70–99)
GLUCOSE BLD-MCNC: 177 MG/DL (ref 70–99)
GLUCOSE BLD-MCNC: 226 MG/DL (ref 70–99)
GLUCOSE BLD-MCNC: 336 MG/DL (ref 70–99)
HCT VFR BLD CALC: 37.1 % (ref 40.5–52.5)
HEMOGLOBIN: 12.1 G/DL (ref 13.5–17.5)
LACTIC ACID: 1.5 MMOL/L (ref 0.4–2)
LD, FLUID: 336 U/L
LYMPHOCYTES ABSOLUTE: 0.3 K/UL (ref 1–5.1)
LYMPHOCYTES RELATIVE PERCENT: 1.4 %
LYMPHOCYTES, BODY FLUID: 2 %
MAGNESIUM: 2.1 MG/DL (ref 1.8–2.4)
MCH RBC QN AUTO: 29.8 PG (ref 26–34)
MCHC RBC AUTO-ENTMCNC: 32.6 G/DL (ref 31–36)
MCV RBC AUTO: 91.3 FL (ref 80–100)
MONOCYTE, FLUID: 4 %
MONOCYTES ABSOLUTE: 0.2 K/UL (ref 0–1.3)
MONOCYTES RELATIVE PERCENT: 1.1 %
MONONUCLEAR UNIDENTIFIED CELLS FLUID: 1 %
NEUTROPHIL, FLUID: 93 %
NEUTROPHILS ABSOLUTE: 21.5 K/UL (ref 1.7–7.7)
NEUTROPHILS RELATIVE PERCENT: 97.2 %
NUCLEATED CELLS FLUID: NORMAL /CUMM
NUMBER OF CELLS COUNTED FLUID: 100
PDW BLD-RTO: 14.8 % (ref 12.4–15.4)
PERFORMED ON: ABNORMAL
PH, BODY FLUID: 7.5
PLATELET # BLD: 496 K/UL (ref 135–450)
PMV BLD AUTO: 7.3 FL (ref 5–10.5)
POTASSIUM SERPL-SCNC: 4 MMOL/L (ref 3.5–5.1)
PROTEIN FLUID: 3.3 G/DL
RBC # BLD: 4.07 M/UL (ref 4.2–5.9)
RBC FLUID: NORMAL /CUMM
SODIUM BLD-SCNC: 136 MMOL/L (ref 136–145)
WBC # BLD: 22.1 K/UL (ref 4–11)

## 2023-01-08 PROCEDURE — 93005 ELECTROCARDIOGRAM TRACING: CPT | Performed by: INTERNAL MEDICINE

## 2023-01-08 PROCEDURE — 2700000000 HC OXYGEN THERAPY PER DAY

## 2023-01-08 PROCEDURE — 80048 BASIC METABOLIC PNL TOTAL CA: CPT

## 2023-01-08 PROCEDURE — 87449 NOS EACH ORGANISM AG IA: CPT

## 2023-01-08 PROCEDURE — 6360000002 HC RX W HCPCS: Performed by: INTERNAL MEDICINE

## 2023-01-08 PROCEDURE — 1200000000 HC SEMI PRIVATE

## 2023-01-08 PROCEDURE — 71260 CT THORAX DX C+: CPT

## 2023-01-08 PROCEDURE — 94761 N-INVAS EAR/PLS OXIMETRY MLT: CPT

## 2023-01-08 PROCEDURE — 36415 COLL VENOUS BLD VENIPUNCTURE: CPT

## 2023-01-08 PROCEDURE — 93010 ELECTROCARDIOGRAM REPORT: CPT | Performed by: INTERNAL MEDICINE

## 2023-01-08 PROCEDURE — 6370000000 HC RX 637 (ALT 250 FOR IP): Performed by: INTERNAL MEDICINE

## 2023-01-08 PROCEDURE — 6360000004 HC RX CONTRAST MEDICATION: Performed by: INTERNAL MEDICINE

## 2023-01-08 PROCEDURE — 2580000003 HC RX 258: Performed by: INTERNAL MEDICINE

## 2023-01-08 PROCEDURE — 83735 ASSAY OF MAGNESIUM: CPT

## 2023-01-08 PROCEDURE — 87070 CULTURE OTHR SPECIMN AEROBIC: CPT

## 2023-01-08 PROCEDURE — 94640 AIRWAY INHALATION TREATMENT: CPT

## 2023-01-08 PROCEDURE — 6370000000 HC RX 637 (ALT 250 FOR IP): Performed by: NURSE PRACTITIONER

## 2023-01-08 PROCEDURE — 83036 HEMOGLOBIN GLYCOSYLATED A1C: CPT

## 2023-01-08 PROCEDURE — 87641 MR-STAPH DNA AMP PROBE: CPT

## 2023-01-08 PROCEDURE — 85025 COMPLETE CBC W/AUTO DIFF WBC: CPT

## 2023-01-08 PROCEDURE — 87205 SMEAR GRAM STAIN: CPT

## 2023-01-08 PROCEDURE — 83605 ASSAY OF LACTIC ACID: CPT

## 2023-01-08 RX ORDER — POTASSIUM CHLORIDE 7.45 MG/ML
10 INJECTION INTRAVENOUS PRN
Status: DISCONTINUED | OUTPATIENT
Start: 2023-01-08 | End: 2023-01-17

## 2023-01-08 RX ORDER — LISINOPRIL 10 MG/1
TABLET ORAL
Status: ON HOLD | COMMUNITY
Start: 2022-12-07 | End: 2023-01-18 | Stop reason: HOSPADM

## 2023-01-08 RX ORDER — SODIUM CHLORIDE 9 MG/ML
INJECTION, SOLUTION INTRAVENOUS CONTINUOUS
Status: ACTIVE | OUTPATIENT
Start: 2023-01-08 | End: 2023-01-09

## 2023-01-08 RX ORDER — MAGNESIUM SULFATE 1 G/100ML
1000 INJECTION INTRAVENOUS PRN
Status: DISCONTINUED | OUTPATIENT
Start: 2023-01-08 | End: 2023-01-18 | Stop reason: HOSPADM

## 2023-01-08 RX ORDER — ACETAMINOPHEN 650 MG/1
650 SUPPOSITORY RECTAL EVERY 6 HOURS PRN
Status: DISCONTINUED | OUTPATIENT
Start: 2023-01-08 | End: 2023-01-18 | Stop reason: HOSPADM

## 2023-01-08 RX ORDER — GABAPENTIN 300 MG/1
300 CAPSULE ORAL 3 TIMES DAILY
Status: DISCONTINUED | OUTPATIENT
Start: 2023-01-08 | End: 2023-01-18 | Stop reason: HOSPADM

## 2023-01-08 RX ORDER — ASPIRIN 81 MG/1
81 TABLET ORAL DAILY
Status: DISCONTINUED | OUTPATIENT
Start: 2023-01-08 | End: 2023-01-18 | Stop reason: HOSPADM

## 2023-01-08 RX ORDER — SODIUM CHLORIDE 9 MG/ML
INJECTION, SOLUTION INTRAVENOUS PRN
Status: DISCONTINUED | OUTPATIENT
Start: 2023-01-08 | End: 2023-01-18 | Stop reason: HOSPADM

## 2023-01-08 RX ORDER — ACETAMINOPHEN 325 MG/1
650 TABLET ORAL EVERY 6 HOURS PRN
Status: DISCONTINUED | OUTPATIENT
Start: 2023-01-08 | End: 2023-01-18 | Stop reason: HOSPADM

## 2023-01-08 RX ORDER — SIMVASTATIN 40 MG
40 TABLET ORAL NIGHTLY
COMMUNITY
Start: 2020-10-27

## 2023-01-08 RX ORDER — ENOXAPARIN SODIUM 100 MG/ML
40 INJECTION SUBCUTANEOUS DAILY
Status: DISCONTINUED | OUTPATIENT
Start: 2023-01-08 | End: 2023-01-18 | Stop reason: HOSPADM

## 2023-01-08 RX ORDER — SODIUM CHLORIDE 0.9 % (FLUSH) 0.9 %
10 SYRINGE (ML) INJECTION PRN
Status: DISCONTINUED | OUTPATIENT
Start: 2023-01-08 | End: 2023-01-18 | Stop reason: HOSPADM

## 2023-01-08 RX ORDER — HYDROCODONE BITARTRATE AND ACETAMINOPHEN 5; 325 MG/1; MG/1
1 TABLET ORAL EVERY 6 HOURS PRN
Status: DISCONTINUED | OUTPATIENT
Start: 2023-01-08 | End: 2023-01-16

## 2023-01-08 RX ORDER — NICOTINE 21 MG/24HR
1 PATCH, TRANSDERMAL 24 HOURS TRANSDERMAL DAILY
Status: DISCONTINUED | OUTPATIENT
Start: 2023-01-08 | End: 2023-01-18 | Stop reason: HOSPADM

## 2023-01-08 RX ORDER — INSULIN LISPRO 100 [IU]/ML
0-4 INJECTION, SOLUTION INTRAVENOUS; SUBCUTANEOUS NIGHTLY
Status: DISCONTINUED | OUTPATIENT
Start: 2023-01-08 | End: 2023-01-18 | Stop reason: HOSPADM

## 2023-01-08 RX ORDER — LANOLIN ALCOHOL/MO/W.PET/CERES
3 CREAM (GRAM) TOPICAL NIGHTLY PRN
Status: DISCONTINUED | OUTPATIENT
Start: 2023-01-08 | End: 2023-01-18 | Stop reason: HOSPADM

## 2023-01-08 RX ORDER — DEXTROAMPHETAMINE SACCHARATE, AMPHETAMINE ASPARTATE, DEXTROAMPHETAMINE SULFATE AND AMPHETAMINE SULFATE 2.5; 2.5; 2.5; 2.5 MG/1; MG/1; MG/1; MG/1
30 TABLET ORAL DAILY
Status: DISCONTINUED | OUTPATIENT
Start: 2023-01-08 | End: 2023-01-08

## 2023-01-08 RX ORDER — INSULIN LISPRO 100 [IU]/ML
0-4 INJECTION, SOLUTION INTRAVENOUS; SUBCUTANEOUS
Status: DISCONTINUED | OUTPATIENT
Start: 2023-01-08 | End: 2023-01-18 | Stop reason: HOSPADM

## 2023-01-08 RX ORDER — IPRATROPIUM BROMIDE AND ALBUTEROL SULFATE 2.5; .5 MG/3ML; MG/3ML
1 SOLUTION RESPIRATORY (INHALATION) EVERY 4 HOURS PRN
Status: DISCONTINUED | OUTPATIENT
Start: 2023-01-08 | End: 2023-01-18 | Stop reason: HOSPADM

## 2023-01-08 RX ORDER — DEXTROSE MONOHYDRATE 100 MG/ML
INJECTION, SOLUTION INTRAVENOUS CONTINUOUS PRN
Status: DISCONTINUED | OUTPATIENT
Start: 2023-01-08 | End: 2023-01-18 | Stop reason: HOSPADM

## 2023-01-08 RX ORDER — AZITHROMYCIN 250 MG/1
250 TABLET, FILM COATED ORAL DAILY
Status: DISCONTINUED | OUTPATIENT
Start: 2023-01-08 | End: 2023-01-09

## 2023-01-08 RX ORDER — ONDANSETRON 4 MG/1
4 TABLET, ORALLY DISINTEGRATING ORAL EVERY 8 HOURS PRN
Status: DISCONTINUED | OUTPATIENT
Start: 2023-01-08 | End: 2023-01-18 | Stop reason: HOSPADM

## 2023-01-08 RX ORDER — POTASSIUM CHLORIDE 20 MEQ/1
40 TABLET, EXTENDED RELEASE ORAL PRN
Status: DISCONTINUED | OUTPATIENT
Start: 2023-01-08 | End: 2023-01-17

## 2023-01-08 RX ORDER — GABAPENTIN 300 MG/1
CAPSULE ORAL
COMMUNITY
Start: 2022-12-21

## 2023-01-08 RX ORDER — CALCIUM CARBONATE 200(500)MG
1000 TABLET,CHEWABLE ORAL 3 TIMES DAILY PRN
Status: DISCONTINUED | OUTPATIENT
Start: 2023-01-08 | End: 2023-01-18 | Stop reason: HOSPADM

## 2023-01-08 RX ORDER — DEXTROAMPHETAMINE SACCHARATE, AMPHETAMINE ASPARTATE, DEXTROAMPHETAMINE SULFATE AND AMPHETAMINE SULFATE 2.5; 2.5; 2.5; 2.5 MG/1; MG/1; MG/1; MG/1
30 TABLET ORAL
Status: DISCONTINUED | OUTPATIENT
Start: 2023-01-08 | End: 2023-01-18 | Stop reason: HOSPADM

## 2023-01-08 RX ORDER — SODIUM CHLORIDE, SODIUM LACTATE, POTASSIUM CHLORIDE, AND CALCIUM CHLORIDE .6; .31; .03; .02 G/100ML; G/100ML; G/100ML; G/100ML
1000 INJECTION, SOLUTION INTRAVENOUS ONCE
Status: COMPLETED | OUTPATIENT
Start: 2023-01-08 | End: 2023-01-08

## 2023-01-08 RX ORDER — ONDANSETRON 2 MG/ML
4 INJECTION INTRAMUSCULAR; INTRAVENOUS EVERY 6 HOURS PRN
Status: DISCONTINUED | OUTPATIENT
Start: 2023-01-08 | End: 2023-01-18 | Stop reason: HOSPADM

## 2023-01-08 RX ADMIN — HYDROCODONE BITARTRATE AND ACETAMINOPHEN 1 TABLET: 5; 325 TABLET ORAL at 22:58

## 2023-01-08 RX ADMIN — IOPAMIDOL 75 ML: 755 INJECTION, SOLUTION INTRAVENOUS at 13:27

## 2023-01-08 RX ADMIN — HYDROCODONE BITARTRATE AND ACETAMINOPHEN 1 TABLET: 5; 325 TABLET ORAL at 10:09

## 2023-01-08 RX ADMIN — GABAPENTIN 300 MG: 300 CAPSULE ORAL at 23:31

## 2023-01-08 RX ADMIN — CEFTRIAXONE SODIUM 1000 MG: 1 INJECTION, POWDER, FOR SOLUTION INTRAMUSCULAR; INTRAVENOUS at 11:03

## 2023-01-08 RX ADMIN — SODIUM CHLORIDE: 9 INJECTION, SOLUTION INTRAVENOUS at 21:43

## 2023-01-08 RX ADMIN — DEXTROAMPHETAMINE SACCHARATE, AMPHETAMINE ASPARTATE, DEXTROAMPHETAMINE SULFATE, AMPHETAMINE SULFATE TABLETS, 10 MG,CLL 30 MG: 2.5; 2.5; 2.5; 2.5 TABLET ORAL at 08:57

## 2023-01-08 RX ADMIN — INSULIN LISPRO 1 UNITS: 100 INJECTION, SOLUTION INTRAVENOUS; SUBCUTANEOUS at 12:11

## 2023-01-08 RX ADMIN — Medication 3 MG: at 22:58

## 2023-01-08 RX ADMIN — ACETAMINOPHEN 325MG 650 MG: 325 TABLET ORAL at 06:04

## 2023-01-08 RX ADMIN — AZITHROMYCIN MONOHYDRATE 250 MG: 250 TABLET ORAL at 08:57

## 2023-01-08 RX ADMIN — Medication 10 ML: at 22:58

## 2023-01-08 RX ADMIN — INSULIN LISPRO 3 UNITS: 100 INJECTION, SOLUTION INTRAVENOUS; SUBCUTANEOUS at 17:15

## 2023-01-08 RX ADMIN — ENOXAPARIN SODIUM 40 MG: 100 INJECTION SUBCUTANEOUS at 08:57

## 2023-01-08 RX ADMIN — HYDROMORPHONE HYDROCHLORIDE 1 MG: 1 INJECTION, SOLUTION INTRAMUSCULAR; INTRAVENOUS; SUBCUTANEOUS at 02:44

## 2023-01-08 RX ADMIN — IPRATROPIUM BROMIDE AND ALBUTEROL SULFATE 1 AMPULE: 2.5; .5 SOLUTION RESPIRATORY (INHALATION) at 07:31

## 2023-01-08 RX ADMIN — ASPIRIN 81 MG: 81 TABLET, COATED ORAL at 08:57

## 2023-01-08 RX ADMIN — CEFTRIAXONE SODIUM 1000 MG: 1 INJECTION, POWDER, FOR SOLUTION INTRAMUSCULAR; INTRAVENOUS at 21:46

## 2023-01-08 RX ADMIN — SODIUM CHLORIDE, POTASSIUM CHLORIDE, SODIUM LACTATE AND CALCIUM CHLORIDE 1000 ML: 600; 310; 30; 20 INJECTION, SOLUTION INTRAVENOUS at 06:00

## 2023-01-08 RX ADMIN — METHYLPREDNISOLONE SODIUM SUCCINATE 40 MG: 40 INJECTION, POWDER, FOR SOLUTION INTRAMUSCULAR; INTRAVENOUS at 22:58

## 2023-01-08 RX ADMIN — SODIUM CHLORIDE: 9 INJECTION, SOLUTION INTRAVENOUS at 11:01

## 2023-01-08 RX ADMIN — IPRATROPIUM BROMIDE AND ALBUTEROL SULFATE 1 AMPULE: 2.5; .5 SOLUTION RESPIRATORY (INHALATION) at 00:40

## 2023-01-08 RX ADMIN — HYDROCODONE BITARTRATE AND ACETAMINOPHEN 1 TABLET: 5; 325 TABLET ORAL at 16:24

## 2023-01-08 RX ADMIN — HYDROMORPHONE HYDROCHLORIDE 1 MG: 1 INJECTION, SOLUTION INTRAMUSCULAR; INTRAVENOUS; SUBCUTANEOUS at 06:27

## 2023-01-08 RX ADMIN — IPRATROPIUM BROMIDE AND ALBUTEROL SULFATE 1 AMPULE: 2.5; .5 SOLUTION RESPIRATORY (INHALATION) at 11:19

## 2023-01-08 RX ADMIN — METHYLPREDNISOLONE SODIUM SUCCINATE 40 MG: 40 INJECTION, POWDER, FOR SOLUTION INTRAMUSCULAR; INTRAVENOUS at 12:10

## 2023-01-08 RX ADMIN — SODIUM CHLORIDE: 9 INJECTION, SOLUTION INTRAVENOUS at 12:55

## 2023-01-08 RX ADMIN — DEXTROAMPHETAMINE SACCHARATE, AMPHETAMINE ASPARTATE, DEXTROAMPHETAMINE SULFATE, AMPHETAMINE SULFATE TABLETS, 10 MG,CLL 30 MG: 2.5; 2.5; 2.5; 2.5 TABLET ORAL at 15:55

## 2023-01-08 ASSESSMENT — PAIN SCALES - GENERAL
PAINLEVEL_OUTOF10: 8
PAINLEVEL_OUTOF10: 1
PAINLEVEL_OUTOF10: 8
PAINLEVEL_OUTOF10: 9
PAINLEVEL_OUTOF10: 8
PAINLEVEL_OUTOF10: 7

## 2023-01-08 ASSESSMENT — PAIN DESCRIPTION - LOCATION
LOCATION: FLANK
LOCATION: FLANK
LOCATION: CHEST
LOCATION: FLANK

## 2023-01-08 ASSESSMENT — PAIN DESCRIPTION - ORIENTATION
ORIENTATION: RIGHT

## 2023-01-08 ASSESSMENT — PAIN DESCRIPTION - DESCRIPTORS
DESCRIPTORS: SHARP
DESCRIPTORS: SQUEEZING

## 2023-01-08 NOTE — PLAN OF CARE
Problem: Safety - Adult  Goal: Free from fall injury  1/8/2023 1021 by Sudhir Mathur RN  Outcome: Progressing  Note: Pt will remain free from falls throughout hospital stay. Bed in lowest position with wheels locked and side rails 2/4 up. Hourly rounding completed. Patient ambulates safely independently, call light is within reach. Will continue to monitor throughout shift. Problem: Pain  Goal: Verbalizes/displays adequate comfort level or baseline comfort level  1/8/2023 1021 by Sudhir Mathur RN  Outcome: Progressing  Note: Pt will be satisfied with pain control with PRN Norco and PRN dilaudid. Pt uses numeric pain rating scale with reassessments after pain med administration. Will continue to monitor progression throughout shift. Problem: Chronic Conditions and Co-morbidities  Goal: Patient's chronic conditions and co-morbidity symptoms are monitored and maintained or improved  1/8/2023 1021 by Sudhir Mathur RN  Outcome: Progressing  Note: Pt will have accuchecks before meals and at bedtime with sliding scale insulin in place for coverage. Will continue to monitor for signs and symptoms of hypoglycemia and hyperglycemia throughout shift.

## 2023-01-08 NOTE — PLAN OF CARE
Problem: Discharge Planning  Goal: Discharge to home or other facility with appropriate resources  Outcome: Progressing     Problem: Safety - Adult  Goal: Free from fall injury  Outcome: Progressing     Problem: Pain  Goal: Verbalizes/displays adequate comfort level or baseline comfort level  Outcome: Progressing     Problem: Nutrition Deficit:  Goal: Optimize nutritional status  Outcome: Progressing     Problem: Chronic Conditions and Co-morbidities  Goal: Patient's chronic conditions and co-morbidity symptoms are monitored and maintained or improved  Outcome: Progressing

## 2023-01-08 NOTE — RT PROTOCOL NOTE
RT Inhaler-Nebulizer Bronchodilator Protocol Note    There is a bronchodilator order in the chart from a provider indicating to follow the RT Bronchodilator Protocol and there is an Initiate RT Inhaler-Nebulizer Bronchodilator Protocol order as well (see protocol at bottom of note). CXR Findings:  XR CHEST PORTABLE    Result Date: 1/8/2023  No pneumothorax identified. Redemonstration of bilateral airspace disease, right greater than left. XR CHEST PORTABLE    Result Date: 1/7/2023  Mild central pulmonary congestion Moderate airspace opacity right lung base and hazy opacities along the left upper lobe and left lung base which could represent multisegmental bronchopneumonia. Recommend short-term follow-up Small questionable small right pleural effusion. 1.6 cm nodule left lung base laterally which is more apparent could represent a small neoplasm. Recommend CT correlation       The findings from the last RT Protocol Assessment were as follows:   History Pulmonary Disease: Smoker 15 pack years or more  Respiratory Pattern: Mild dyspnea at rest, irregular pattern, or RR 21-25 bpm  Breath Sounds: Inspiratory and expiratory or bilateral wheezing and/or rhonchi  Cough: Strong, productive  Indication for Bronchodilator Therapy: On home bronchodilators  Bronchodilator Assessment Score: 12    Aerosolized bronchodilator medication orders have been revised according to the RT Inhaler-Nebulizer Bronchodilator Protocol below. Respiratory Therapist to perform RT Therapy Protocol Assessment initially then follow the protocol. Repeat RT Therapy Protocol Assessment PRN for score 0-3 or on second treatment, BID, and PRN for scores above 3. No Indications - adjust the frequency to every 6 hours PRN wheezing or bronchospasm, if no treatments needed after 48 hours then discontinue using Per Protocol order mode.      If indication present, adjust the RT bronchodilator orders based on the Bronchodilator Assessment Score as indicated below. Use Inhaler orders unless patient has one or more of the following: on home nebulizer, not able to hold breath for 10 seconds, is not alert and oriented, cannot activate and use MDI correctly, or respiratory rate 25 breaths per minute or more, then use the equivalent nebulizer order(s) with same Frequency and PRN reasons based on the score. If a patient is on this medication at home then do not decrease Frequency below that used at home. 0-3 - enter or revise RT bronchodilator order(s) to equivalent RT Bronchodilator order with Frequency of every 4 hours PRN for wheezing or increased work of breathing using Per Protocol order mode. 4-6 - enter or revise RT Bronchodilator order(s) to two equivalent RT bronchodilator orders with one order with BID Frequency and one order with Frequency of every 4 hours PRN wheezing or increased work of breathing using Per Protocol order mode. 7-10 - enter or revise RT Bronchodilator order(s) to two equivalent RT bronchodilator orders with one order with TID Frequency and one order with Frequency of every 4 hours PRN wheezing or increased work of breathing using Per Protocol order mode. 11-13 - enter or revise RT Bronchodilator order(s) to one equivalent RT bronchodilator order with QID Frequency and an Albuterol order with Frequency of every 4 hours PRN wheezing or increased work of breathing using Per Protocol order mode. Greater than 13 - enter or revise RT Bronchodilator order(s) to one equivalent RT bronchodilator order with every 4 hours Frequency and an Albuterol order with Frequency of every 2 hours PRN wheezing or increased work of breathing using Per Protocol order mode. RT to enter RT Home Evaluation for COPD & MDI Assessment order using Per Protocol order mode.     Electronically signed by Vania Rankin RCP on 1/8/2023 at 2:47 AM

## 2023-01-08 NOTE — H&P
Hospital Medicine History & Physical      Patient: Prem Yepez Covert  :  1960  MRN:  [id number]    Date of Service: 23    Chief Complaint   Patient presents with    Chest Pain     For about a week. 10/10 pain worsening upon inspiration. 88 on RA in triage. Placed on 2L    Shortness of Breath    Cough       HISTORY OF PRESENT ILLNESS:    Bettie Booker is a 65 y.o. male. He presented to the eR from home. He c/o feeling ill for about a week. He describes feeling constitutionally ill w/ fatigue, malaise, loss of appetite. Along the way he has had a worsening cough, worsening dyspnea, and pretty marked right-sided pleuritic chest pain along the lateral and anterolater right costal margin. He denied hemoptysis. Patient is a  long time smoker. He does not have any formally established diagnosis of lung disease though. He does not use any inhalers at home. He does not usually require supplemental O2. Review of Systems:  All pertinent positives and negatives are as noted in the HPI section. All other systems were reviewed and are negative. Past Medical History:   Diagnosis Date    Anxiety     Clostridium difficile infection 2020    Clostridium difficile infection 2020    GERD (gastroesophageal reflux disease)     Hypertension        Past Surgical History:   Procedure Laterality Date    ABDOMEN SURGERY      ANKLE SURGERY Right     HAND SURGERY Left     KNEE SURGERY Right     ROTATOR CUFF REPAIR Right          Prior to Admission medications    Medication Sig Start Date End Date Taking?  Authorizing Provider   naproxen (NAPROSYN) 250 MG tablet Take 1 tablet by mouth 2 times daily as needed for Pain 22   Clearance EYAL Oconnell - CNP   ondansetron [facility]) 4 MG tablet Take 1 tablet by mouth every 8 hours as needed for Nausea 3/23/20   Marino Mail, DONNA   dicyclomine (BENTYL) 10 MG capsule Take 1 capsule by mouth every 6 hours as needed (cramps) 3/23/20   Marino Mail, PAVINCENT albuterol sulfate  (90 Base) MCG/ACT inhaler Inhale 2 puffs into the lungs every 4 hours as needed for Wheezing 2/1/20 2/8/20  Ronan Garcia MD   fluticasone [facility]) 50 MCG/ACT nasal spray 1 spray by Nasal route daily for 10 days 2/1/20 2/11/20  Ronan Garcia MD   amphetamine-dextroamphetamine (ADDERALL) 30 MG tablet TAKE 1 TABLET BY MOUTH EVERY MORNING AND AFTERNOON 11/13/17   Historical Provider, MD   lisinopril (PRINIVIL;ZESTRIL) 40 MG tablet Take 40 mg by mouth daily. Historical Provider, MD   aspirin 81 MG tablet Take 81 mg by mouth daily. Historical Provider, MD       Allergies:   Patient has no known allergies. Social:   reports that he has been smoking cigarettes. He has been smoking an average of .5 packs per day. He has never used smokeless tobacco.   reports no history of alcohol use. Social History     Substance and Sexual Activity   Drug Use No     Family History  Medical History Relation Name Comments   High BP Brother 2       High BP Father        Diabetes Maternal Grandmother        Heart Disease Mother    Heart valve    ADHD Paternal Aunt    mom sister   Diabetes Sister 3       High BP Sister 4       Genetic Sister 5   marfans syn       PHYSICAL EXAM:  I performed this physical examination. Vitals:  Patient Vitals for the past 24 hrs:   BP Temp Temp src Pulse Resp SpO2 Height Weight   01/07/23 2129 (!) 146/80 -- -- (!) 105 30 93 % -- --   01/07/23 2038 (!) 156/79 98.5 °F (36.9 °C) Oral (!) 113 24 94 % 5' 8\" (1.727 m) 165 lb (74.8 kg)     2 L/min O2    GEN:  Appearance:  Age appropriate WM in mild respiratory distress at rest.  Halting and wincing with each breath . Level of Consciousness:  alert . Orientation:  full    HEENT: Sclera anicteric.  no conjunctival chemosis. moist mucus membranes. no specific or diagnostic oral lesions. NECK:  no signs of meningismus. Jugular veins not distended. Carotid pulses  2+.  no cervical lymphadenopathy.   no thyromegaly. CV:  regular rhythm. normal S1 & S2.    no murmur. no rub.  no gallop. PULM:  Chest excursion is symmetric. Breath sounds are vesicular throughout the left, very diminished on the right in the lower 2/3 of the lung field w/ a few faint crackles in the same area. .      AB:  Abdominal shape is normal.  Bowel sounds are active. Generally soft to palpation. no tenderness is present. no involuntary guarding. no rebound guarding. EXTR:  Skin is warm. Capillary refill brisk. no specific or pathognomic rash. no clubbing. no pitting edema. no active wound or ulcer. Pulses 2+ x 4    POINT OF CARE ULTRASOUND EXAMINATION    I performed and interpreted the following study. My findings are summarized as follows. Thoracic  Right:  Moderate sized right pleural effusion. Underlying lung is consolidated. No apparent loculations. LABS:  Lab Results   Component Value Date    WBC 16.9 (H) 01/07/2023    HGB 11.4 (L) 01/07/2023    HCT 34.2 (L) 01/07/2023    MCV 91.4 01/07/2023     (H) 01/07/2023     Lab Results   Component Value Date    CREATININE <0.5 (L) 01/07/2023    BUN 22 (H) 01/07/2023     (L) 01/07/2023    K 4.9 01/07/2023    CL 98 (L) 01/07/2023    CO2 25 01/07/2023     Lab Results   Component Value Date    ALT 23 07/23/2021    AST 18 07/23/2021    ALKPHOS 116 07/23/2021    BILITOT 0.4 07/23/2021     Lab Results   Component Value Date    TROPONINI <0.01 01/07/2023     No results for input(s): PHART, SCB2PIC, PO2ART in the last 72 hours. IMAGING:  XR CHEST PORTABLE    Result Date: 1/7/2023  EXAMINATION: ONE XRAY VIEW OF THE CHEST 1/7/2023 8:43 pm COMPARISON: 11/18/2020 HISTORY: ORDERING SYSTEM PROVIDED HISTORY: chest pain TECHNOLOGIST PROVIDED HISTORY: Reason for exam:->chest pain Reason for Exam: CP, SOB, cough FINDINGS: The heart is borderline enlarged unchanged. The pulmonary vessels are engorged centrally.   There is moderate airspace opacity along the right perihilar region extending inferiorly. There is some hazy ground-glass opacity scattered along the left upper and lower lung which is more prominent. There is mild blunting of the right costophrenic sulcus which is more apparent. There is a rounded nodule projecting along left lung base laterally measuring 1.6 cm which is apparent     Mild central pulmonary congestion Moderate airspace opacity right lung base and hazy opacities along the left upper lobe and left lung base which could represent multisegmental bronchopneumonia. Recommend short-term follow-up Small questionable small right pleural effusion. 1.6 cm nodule left lung base laterally which is more apparent could represent a small neoplasm. Recommend CT correlation     I directly reviewed all recent imaging studies as well as pertinent prior studies. Radiology reports may or may not be available at the time of my review. EKG:  New and pertinent prior tracings were directly reviewed. My interpretation is as follows:  Sinus tachycardia. Active Hospital Problems    Diagnosis Date Noted    DM2 (diabetes mellitus, type 2) ([facility] [E11.9] 01/07/2023     Priority: Medium    Tobacco abuse [Z72.0] 01/07/2023     Priority: Medium    ADHD [F90.9] 01/07/2023     Priority: Medium    History of sleeve gastrectomy [Z90.3] 01/07/2023     Priority: Medium    Pneumonia of right lower lobe due to infectious organism [J18.9] 01/07/2023     Priority: Medium       ASSESSMENT & PLAN  Right-sided PNA, Suspected COPD w/ exacerbation, Tobacco abuse  -  Technically septic w/ new O2 requirement. -  Titrate level of respiratory support according to patient's needs. Target goal SpO2 = 90-94%. Currently patient is requiring 2 L/min O2 support. At baseline he does not require supplemental O2.  -  Drain right pleural effusion. Initial fluid analysis c/w exudate and neutrophilic infiltration. Awaiting pleural fluid pH. GPC present on gram stain.   -  cultures: blood, sputum (if any), pleural fluid, urine legionella and pneumococcal Ag's, MRSA DNA probe  -  Empiric abx:  ceftriaxone, azithromycin  -  Other: solumedrol 40mg IV q12h, scheduled and prn bronchodilators. -  Smoking cessation advised. NRT provided. DM2  -  Not treated as an outpatient. Steroid induced hyperglycemia is anticipated. Low lispro S/S made available for now to get an idea of how much insulin patient requires. ADHD  -  Continue home adderall. DVT prophylaxis: SCDs, lovenox  Code Status:  Full  Disposition:  Inpatient w/ eventual d/c to home anticipated.     Alina Serrano MD MD

## 2023-01-08 NOTE — ED PROVIDER NOTES
[facility]  ED  EMERGENCY DEPARTMENT ENCOUNTER        Pt Name: Shea Whitmore  MRN: [id number]  Armsmeñogfmanav 1960  Date of evaluation: 1/7/2023  Provider: Enzo Casiano PA-C  PCP: Shakira Thomas MD  Note Started: 8:57 PM EST 1/7/23      KRYS. I have evaluated this patient. My supervising physician was available for consultation. I personally saw the patient and independently provided 34 minutes of non-concurrent critical care time out of the total critical care time provided. This excludes time spent doing separately billable procedures. This includes time at the bedside, data interpretation, medication management, obtaining critical history from collateral sources if the patient is unable to provide it directly, and physician consultation. Specifics of interventions taken and potentially life-threatening diagnostic considerations are listed above in the medical decision making. CHIEF COMPLAINT       Chief Complaint   Patient presents with    Chest Pain     For about a week. 10/10 pain worsening upon inspiration. 88 on RA in triage. Placed on 2L    Shortness of Breath    Cough       HISTORY OF PRESENT ILLNESS: 1 or more Elements     History From: patient  Limitations to history : None    Shea Whitmore is a 65 y.o. male who presents to the emergency department with a chief complaint of some shortness of breath, cough that is mostly nonproductive with some right-sided chest discomfort. This is been ongoing for the past few days. States has been having fevers as high as 104 at home. Does smoke a half a pack of cigarettes a day. Denies any known history of COPD or asthma or lung disease. Denies any recent sick contacts or recent travel. Denies abdominal discomfort or urinary or bowel symptoms. Nursing Notes were all reviewed and agreed with or any disagreements were addressed in the HPI.     REVIEW OF SYSTEMS :      Review of Systems    Positives and Pertinent negatives as per HPI. SURGICAL HISTORY     Past Surgical History:   Procedure Laterality Date    ABDOMEN SURGERY      ANKLE SURGERY Right     HAND SURGERY Left     KNEE SURGERY Right     ROTATOR CUFF REPAIR Right        CURRENTMEDICATIONS       Previous Medications    ALBUTEROL SULFATE  (90 BASE) MCG/ACT INHALER    Inhale 2 puffs into the lungs every 4 hours as needed for Wheezing    AMPHETAMINE-DEXTROAMPHETAMINE (ADDERALL) 30 MG TABLET    TAKE 1 TABLET BY MOUTH EVERY MORNING AND AFTERNOON    ASPIRIN 81 MG TABLET    Take 81 mg by mouth daily. DICYCLOMINE (BENTYL) 10 MG CAPSULE    Take 1 capsule by mouth every 6 hours as needed (cramps)    FLUTICASONE (FLONASE) 50 MCG/ACT NASAL SPRAY    1 spray by Nasal route daily for 10 days    LISINOPRIL (PRINIVIL;ZESTRIL) 40 MG TABLET    Take 40 mg by mouth daily. NAPROXEN (NAPROSYN) 250 MG TABLET    Take 1 tablet by mouth 2 times daily as needed for Pain    ONDANSETRON (ZOFRAN) 4 MG TABLET    Take 1 tablet by mouth every 8 hours as needed for Nausea       ALLERGIES     Patient has no known allergies. FAMILYHISTORY     History reviewed. No pertinent family history. SOCIAL HISTORY       Social History     Tobacco Use    Smoking status: Every Day     Packs/day: 0.50     Types: Cigarettes    Smokeless tobacco: Never   Vaping Use    Vaping Use: Never used   Substance Use Topics    Alcohol use: No    Drug use: No       SCREENINGS        [place] Coma Scale  Eye Opening: Spontaneous  Best Verbal Response: Oriented  Best Motor Response: Obeys commands  [place] Coma Scale Score: 15                CIWA Assessment  BP: (!) 146/80  Heart Rate: (!) 105           PHYSICAL EXAM  1 or more Elements     ED Triage Vitals [01/07/23 2038]   BP Temp Temp Source Heart Rate Resp SpO2 Height Weight   (!) 156/79 98.5 °F (36.9 °C) Oral (!) 113 24 94 % 5' 8\" (1.727 m) 165 lb (74.8 kg)       Physical Exam  Vitals and nursing note reviewed.    Constitutional:       Appearance: He is well-developed. He is not diaphoretic. HENT:      Head: Atraumatic. Nose: Nose normal.   Eyes:      General:         Right eye: No discharge. Left eye: No discharge. Cardiovascular:      Rate and Rhythm: Normal rate and regular rhythm. Heart sounds: No murmur heard. No friction rub. No gallop. Pulmonary:      Effort: No respiratory distress. Breath sounds: No stridor. No wheezing, rhonchi or rales. Comments: Mild tachypnea with decreased breath sounds bilaterally possible crackles at the bilateral lung bases. No retractions or accessory muscle use. Mild conversational dyspnea. Abdominal:      General: Bowel sounds are normal. There is no distension. Palpations: Abdomen is soft. There is no mass. Tenderness: There is no abdominal tenderness. There is no guarding or rebound. Hernia: No hernia is present. Musculoskeletal:         General: No swelling. Normal range of motion. Cervical back: Normal range of motion. Right lower leg: No edema. Left lower leg: No edema. Skin:     General: Skin is warm and dry. Findings: No erythema or rash. Neurological:      Mental Status: He is alert and oriented to person, place, and time. Cranial Nerves: No cranial nerve deficit.    Psychiatric:         Behavior: Behavior normal.           DIAGNOSTIC RESULTS   LABS:    Labs Reviewed   BASIC METABOLIC PANEL - Abnormal; Notable for the following components:       Result Value    Sodium 133 (*)     Chloride 98 (*)     Glucose 136 (*)     BUN 22 (*)     Creatinine <0.5 (*)     All other components within normal limits   CBC WITH AUTO DIFFERENTIAL - Abnormal; Notable for the following components:    WBC 16.9 (*)     RBC 3.75 (*)     Hemoglobin 11.4 (*)     Hematocrit 34.2 (*)     Platelets 518 (*)     Neutrophils Absolute 16.2 (*)     Lymphocytes Absolute 0.5 (*)     Bands Relative 24 (*)     Metamyelocytes Relative 1 (*)     Toxic Granulation Present (*) Vacuolated Neutrophils Present (*)     Anisocytosis 1+ (*)     Poikilocytes 1+ (*)     All other components within normal limits   BRAIN NATRIURETIC PEPTIDE - Abnormal; Notable for the following components:    Pro-BNP 1,840 (*)     All other components within normal limits   PROCALCITONIN - Abnormal; Notable for the following components:    Procalcitonin 2.96 (*)     All other components within normal limits   PROTIME-INR - Abnormal; Notable for the following components:    Protime 16.4 (*)     INR 1.32 (*)     All other components within normal limits   COVID-19 & INFLUENZA COMBO   CULTURE, BLOOD 2   CULTURE, BLOOD 1   LEGIONELLA ANTIGEN, URINE   STREP PNEUMONIAE ANTIGEN   CULTURE, RESPIRATORY   CULTURE, BODY FLUID   TROPONIN   LACTATE, SEPSIS   LACTIC ACID   CELL COUNT WITH DIFFERENTIAL, BODY FLUID   PH, BODY FLUID   LACTATE DEHYDROGENASE, BODY FLUID   PROTEIN, BODY FLUID       When ordered only abnormal lab results are displayed. All other labs were within normal range or not returned as of this dictation. EKG: When ordered, EKG's are interpreted by the Emergency Department Physician in the absence of a cardiologist.  Please see their note for interpretation of EKG. RADIOLOGY:   Non-plain film images such as CT, Ultrasound and MRI are read by the radiologist. Plain radiographic images are visualized and preliminarily interpreted by the ED Provider with the below findings:        Interpretation per the Radiologist below, if available at the time of this note:    XR CHEST PORTABLE   Final Result   Mild central pulmonary congestion      Moderate airspace opacity right lung base and hazy opacities along the left   upper lobe and left lung base which could represent multisegmental   bronchopneumonia. Recommend short-term follow-up      Small questionable small right pleural effusion. 1.6 cm nodule left lung base laterally which is more apparent could represent   a small neoplasm.   Recommend CT correlation         XR CHEST PORTABLE    (Results Pending)     No results found. No results found. PROCEDURES   Unless otherwise noted below, none     Procedures    CRITICAL CARE TIME (.cctime)       PAST MEDICAL HISTORY      has a past medical history of Anxiety, Clostridium difficile infection (03/23/2020), Clostridium difficile infection (03/23/2020), GERD (gastroesophageal reflux disease), and Hypertension. EMERGENCY DEPARTMENT COURSE and DIFFERENTIAL DIAGNOSIS/MDM:   Vitals:    Vitals:    01/07/23 2038 01/07/23 2129   BP: (!) 156/79 (!) 146/80   Pulse: (!) 113 (!) 105   Resp: 24 30   Temp: 98.5 °F (36.9 °C)    TempSrc: Oral    SpO2: 94% 93%   Weight: 165 lb (74.8 kg)    Height: 5' 8\" (1.727 m)        Patient was given the following medications:  Medications   azithromycin (ZITHROMAX) 500 mg in D5W 250ml addavial (has no administration in time range)   lactated ringers bolus (1,000 mLs IntraVENous New Bag 1/7/23 4989)   HYDROmorphone (DILAUDID) injection 1 mg (has no administration in time range)   cefTRIAXone (ROCEPHIN) 1,000 mg in dextrose 5 % 50 mL IVPB mini-bag (1,000 mg IntraVENous New Bag 1/7/23 2234)   morphine sulfate (PF) injection 4 mg (4 mg IntraVENous Given 1/7/23 2300)             Is this patient to be included in the SEP-1 Core Measure due to severe sepsis or septic shock? No   Exclusion criteria - the patient is NOT to be included for SEP-1 Core Measure due to:  May have criteria for sepsis, but does not meet criteria for severe sepsis or septic shock    Chronic Conditions affecting care:    has a past medical history of Anxiety, Clostridium difficile infection (03/23/2020), Clostridium difficile infection (03/23/2020), GERD (gastroesophageal reflux disease), and Hypertension.     CONSULTS: (Who and What was discussed)  Winifred Pike HOSPITALIST  Dr. Priyank Charles    Social Determinants : None    Records Reviewed (Source):     CC/HPI Summary, DDx, ED Course, and Reassessment: Patient presented with some right-sided chest discomfort with shortness of breath, cough and fever. Had some crackles on exam.  Has leukocytosis of 16,000 with procalcitonin of 2.96. Lactic acid is unremarkable. Has bandemia of 24%. X-ray imaging with moderate opacity at the right lung base and left upper lobe and left lung base. Requiring nasal cannula oxygen to keep his oxygen saturation in the mid to low 90s. Does not wear oxygen at home. Blood cultures were obtained and was started on Rocephin and Zithromax for pneumonia. Suspicion for pulmonary embolus or other emergent etiology. There is a small pleural effusion also. Patient stable time admission. Disposition Considerations (tests considered but not done, Admit vs D/C, Shared Decision Making, Pt Expectation of Test or Tx.):        I am the Primary Clinician of Record. FINAL IMPRESSION      1. Septicemia ([facility]    2. Pneumonia due to infectious organism, unspecified laterality, unspecified part of lung    3. Hypoxia          DISPOSITION/PLAN     DISPOSITION Decision To Admit 01/07/2023 09:55:50 PM      PATIENT REFERRED TO:  No follow-up provider specified.     DISCHARGE MEDICATIONS:  New Prescriptions    No medications on file       DISCONTINUED MEDICATIONS:  Discontinued Medications    No medications on file              (Please note that portions of this note were completed with a voice recognition program.  Efforts were made to edit the dictations but occasionally words are mis-transcribed.)    Fabien Palmer PA-C (electronically signed)       Fabien Palmer PA-C  01/07/23 9132

## 2023-01-08 NOTE — ED NOTES
202 @ 79 770 20 12
Report given to RN on A2. Waiting for a telebox to come and get pt.       Laurel Kemp RN  01/08/23 2947
abdominal pain

## 2023-01-08 NOTE — PROCEDURES
[facility] Medicine  Procedure Note    Date of Service:  01/07/23    Procedure: Thoracentesis    Consent: Patient was counseled regarding the potential benefits and risks of the procedure. Verbal consent was provided. Laterality: right    Description:  Patient was seated with legs over the bedside leaning forward on a chair back. The chest was surveyed by ultrasound and the skin was marked in an area overlying a suitable fluid pocket. The site was then prepped and draped in sterile fashion. The skin was infiltrated with 1% lidocaine. The pleural space was entered with a 6 Fr catheter-over-needle apparatus. Tan opaque fluid was aspirated. The catheter was advanced over the needle.  ~300 mL of fluid was removed. The catheter was removed and a sterile dressing was placed over the site. The anterior right chest was then interrogated by ultrasound. Lung sliding was still present . A portable CXR was ordered to evaluate for post-procedure pneumothorax. There was no immediately apparent complication.   Estimated Blood Loss: < 20mL

## 2023-01-08 NOTE — PROGRESS NOTES
INTERNAL MED PROGRESS NOTE      Subjective     Patient report pain at the site of thoracentesis this morning, otherwise stated he feels a lot better than when he came into the ER yesterday. Assessment/Plan      --Community-acquired pneumonia: Continue IV Rocephin and azithromycin. Pneumonia complicated by parapneumonic effusion. Continue supplemental oxygen. As needed pain medication for related pain    --Right pleural effusion: Status postthoracentesis yesterday in the ER. Gram stain showed Gram positive cocci. Pleural fluid analysis pending    --Acute hypoxic respiratory failure: Secondary to severe pneumonia. Currently requiring 3.5 L of NC O2, was not on home O2 prior to this. Continue supplemental oxygen and wean off as able. --Pulmonary nodule: 1.6 cm nodule left lung base, concerning for small neoplasm on chest x-ray. We will get CT chest to further evaluate. --DM type II: Continue subcu insulin regimen including long-acting and short acting insulin as ordered, continue SSI protocol for more adequate glycemic control. -- ADHD: Continue outpatient Adderall      DVT prophylaxis: Heparin/Lovenox        Conchita Huang MD  1/8/2023 @ 10:37 AM      Objective               Intake/Output Summary (Last 24 hours) at 1/8/2023 1037  Last data filed at 1/8/2023 1013  Gross per 24 hour   Intake 360 ml   Output 0 ml   Net 360 ml        Vitals:   Vitals:    01/08/23 0759   BP: (!) 137/90   Pulse: (!) 112   Resp: 18   Temp: 98.1 °F (36.7 °C)   SpO2: 95%       Physical Exam       Constitutional: Well developed, Well nourished, NAD  Neurologic: Alert & oriented x 3, No focal deficits noted. CNs II-XII grossly intact and symmetrical  Psychiatric: Affect normal, Mood normal.  HENT: Normocephalic, Atraumatic,  Eyes: PERRLA, EOMI, No pallor/scleral icterus.   Neck: Normal range of motion, No tenderness, Supple, no bruit  Lymphatic: No cervical lymphadenopathy noted. Cardiovascular: Regular rate and rhythm, normal S1-S2, No murmurs, gallops or rubs. Thorax & Lungs: CTA bilaterally, No respiratory distress, No wheezing   Abdomen: Soft, BS +ve, no tenderness, no rebound or guarding  Skin: Warm, Dry, No erythema, No rash. Back: No tenderness, No CVA tenderness. Extremities: No edema, No tenderness  Musculoskeletal:. No major deformities noted.           Labs       Reviewed, as follows:  Recent Results (from the past 24 hour(s))   Basic Metabolic Panel    Collection Time: 01/07/23  8:48 PM   Result Value Ref Range    Sodium 133 (L) 136 - 145 mmol/L    Potassium 4.9 3.5 - 5.1 mmol/L    Chloride 98 (L) 99 - 110 mmol/L    CO2 25 21 - 32 mmol/L    Anion Gap 10 3 - 16    Glucose 136 (H) 70 - 99 mg/dL    BUN 22 (H) 7 - 20 mg/dL    Creatinine <0.5 (L) 0.8 - 1.3 mg/dL    Est, Glom Filt Rate >60 >60    Calcium 8.3 8.3 - 10.6 mg/dL   Troponin    Collection Time: 01/07/23  8:48 PM   Result Value Ref Range    Troponin <0.01 <0.01 ng/mL   CBC with Auto Differential    Collection Time: 01/07/23  8:48 PM   Result Value Ref Range    WBC 16.9 (H) 4.0 - 11.0 K/uL    RBC 3.75 (L) 4.20 - 5.90 M/uL    Hemoglobin 11.4 (L) 13.5 - 17.5 g/dL    Hematocrit 34.2 (L) 40.5 - 52.5 %    MCV 91.4 80.0 - 100.0 fL    MCH 30.4 26.0 - 34.0 pg    MCHC 33.3 31.0 - 36.0 g/dL    RDW 14.5 12.4 - 15.4 %    Platelets 552 (H) 380 - 450 K/uL    MPV 7.8 5.0 - 10.5 fL    PLATELET SLIDE REVIEW Increased     SLIDE REVIEW see below     Neutrophils % 71.0 %    Lymphocytes % 3.0 %    Monocytes % 1.0 %    Eosinophils % 0.0 %    Basophils % 0.0 %    Neutrophils Absolute 16.2 (H) 1.7 - 7.7 K/uL    Lymphocytes Absolute 0.5 (L) 1.0 - 5.1 K/uL    Monocytes Absolute 0.2 0.0 - 1.3 K/uL    Eosinophils Absolute 0.0 0.0 - 0.6 K/uL    Basophils Absolute 0.0 0.0 - 0.2 K/uL    Bands Relative 24 (H) 0 - 7 %    Metamyelocytes Relative 1 (A) %    Toxic Granulation Present (A)     Vacuolated Neutrophils Present (A)     Anisocytosis 1+ (A)     Poikilocytes 1+ (A)    Lactate, Sepsis    Collection Time: 01/07/23  8:48 PM   Result Value Ref Range    Lactic Acid, Sepsis 1.8 0.4 - 1.9 mmol/L   Brain Natriuretic Peptide    Collection Time: 01/07/23  8:48 PM   Result Value Ref Range    Pro-BNP 1,840 (H) 0 - 124 pg/mL   Procalcitonin    Collection Time: 01/07/23  8:48 PM   Result Value Ref Range    Procalcitonin 2.96 (H) 0.00 - 0.15 ng/mL   EKG 12 Lead    Collection Time: 01/07/23  8:51 PM   Result Value Ref Range    Ventricular Rate 110 BPM    Atrial Rate 110 BPM    P-R Interval 128 ms    QRS Duration 80 ms    Q-T Interval 306 ms    QTc Calculation (Bazett) 414 ms    P Axis 37 degrees    R Axis 39 degrees    T Axis 22 degrees    Diagnosis       Sinus tachycardiaPossible Left atrial enlargementBorderline ECGWhen compared with ECG of 23-JUL-2021 17:46,Non-specific change in ST segment in Inferior leads   COVID-19 & Influenza Combo    Collection Time: 01/07/23  9:09 PM    Specimen: Nasopharyngeal Swab   Result Value Ref Range    SARS-CoV-2 RNA, RT PCR NOT DETECTED NOT DETECTED    INFLUENZA A NOT DETECTED NOT DETECTED    INFLUENZA B NOT DETECTED NOT DETECTED   Protime-INR    Collection Time: 01/07/23 10:40 PM   Result Value Ref Range    Protime 16.4 (H) 11.7 - 14.5 sec    INR 1.32 (H) 0.87 - 1.14   Cell Count with Differential, Body Fluid    Collection Time: 01/07/23 11:27 PM   Result Value Ref Range    Cell Count Fluid Type Pleural     Color, Fluid Yellow     Appearance, Fluid Turbid     Clot Eval. see below     Nucl Cell, Fluid 20,443 /cumm    RBC, Fluid 11,700 /cumm    Neutrophil Count, Fluid 93 %    Lymphocytes, Body Fluid 2 %    Monocyte Count, Fluid 4 %    Unid. Mononu 1 %    Number of Cells Counted Fluid 100     Path Consult + CELL CT/DIFF-BF Yes    Culture, Body Fluid    Collection Time: 01/07/23 11:27 PM    Specimen: Pleural; Body Fluid   Result Value Ref Range    Gram Stain Result       Cytospin performed,no quantitation  WBC's (Polymorphonuclear)  Gram positive cocci     Lactic Acid    Collection Time: 01/08/23 12:27 AM   Result Value Ref Range    Lactic Acid 1.5 0.4 - 2.0 mmol/L   Basic Metabolic Panel    Collection Time: 01/08/23  5:58 AM   Result Value Ref Range    Sodium 136 136 - 145 mmol/L    Potassium 4.0 3.5 - 5.1 mmol/L    Chloride 98 (L) 99 - 110 mmol/L    CO2 26 21 - 32 mmol/L    Anion Gap 12 3 - 16    Glucose 159 (H) 70 - 99 mg/dL    BUN 24 (H) 7 - 20 mg/dL    Creatinine <0.5 (L) 0.8 - 1.3 mg/dL    Est, Glom Filt Rate >60 >60    Calcium 9.0 8.3 - 10.6 mg/dL   CBC with Auto Differential    Collection Time: 01/08/23  5:58 AM   Result Value Ref Range    WBC 22.1 (H) 4.0 - 11.0 K/uL    RBC 4.07 (L) 4.20 - 5.90 M/uL    Hemoglobin 12.1 (L) 13.5 - 17.5 g/dL    Hematocrit 37.1 (L) 40.5 - 52.5 %    MCV 91.3 80.0 - 100.0 fL    MCH 29.8 26.0 - 34.0 pg    MCHC 32.6 31.0 - 36.0 g/dL    RDW 14.8 12.4 - 15.4 %    Platelets 512 (H) 780 - 450 K/uL    MPV 7.3 5.0 - 10.5 fL    Neutrophils % 97.2 %    Lymphocytes % 1.4 %    Monocytes % 1.1 %    Eosinophils % 0.0 %    Basophils % 0.3 %    Neutrophils Absolute 21.5 (H) 1.7 - 7.7 K/uL    Lymphocytes Absolute 0.3 (L) 1.0 - 5.1 K/uL    Monocytes Absolute 0.2 0.0 - 1.3 K/uL    Eosinophils Absolute 0.0 0.0 - 0.6 K/uL    Basophils Absolute 0.1 0.0 - 0.2 K/uL   Magnesium    Collection Time: 01/08/23  5:58 AM   Result Value Ref Range    Magnesium 2.10 1.80 - 2.40 mg/dL   POCT Glucose    Collection Time: 01/08/23  7:50 AM   Result Value Ref Range    POC Glucose 166 (H) 70 - 99 mg/dl    Performed on ACCU-CHEK           Body mass index is 24.8 kg/m².  Patient will follow up with PCP for further management as indicated unless otherwise specifically noted above        Nita Yu MD  1/8/2023 @ 10:37 AM

## 2023-01-08 NOTE — PROGRESS NOTES
Paged Dr. Neftali Mesa \"FYI - patient's heart rate has been 140s-150s while sitting. Thanks\"    STAT EKG ordered, [employer] called EKG. EKG resulted Afib DAVID, RN notified MD.     Heart rate on tele is regular, confirmed with [facility]. MD notified.

## 2023-01-09 PROBLEM — D72.829 LEUKOCYTOSIS: Status: ACTIVE | Noted: 2023-01-09

## 2023-01-09 PROBLEM — F17.200 SMOKER: Status: ACTIVE | Noted: 2023-01-07

## 2023-01-09 PROBLEM — J13 PNEUMONIA OF RIGHT LOWER LOBE DUE TO STREPTOCOCCUS PNEUMONIAE (HCC): Status: ACTIVE | Noted: 2023-01-09

## 2023-01-09 PROBLEM — J86.9 EMPYEMA (HCC): Status: ACTIVE | Noted: 2023-01-09

## 2023-01-09 LAB
ANION GAP SERPL CALCULATED.3IONS-SCNC: 11 MMOL/L (ref 3–16)
BASOPHILS ABSOLUTE: 0 K/UL (ref 0–0.2)
BASOPHILS RELATIVE PERCENT: 0.1 %
BUN BLDV-MCNC: 34 MG/DL (ref 7–20)
CALCIUM SERPL-MCNC: 8.1 MG/DL (ref 8.3–10.6)
CHLORIDE BLD-SCNC: 100 MMOL/L (ref 99–110)
CO2: 28 MMOL/L (ref 21–32)
CREAT SERPL-MCNC: <0.5 MG/DL (ref 0.8–1.3)
EOSINOPHILS ABSOLUTE: 0 K/UL (ref 0–0.6)
EOSINOPHILS RELATIVE PERCENT: 0 %
ESTIMATED AVERAGE GLUCOSE: 116.9 MG/DL
GFR SERPL CREATININE-BSD FRML MDRD: >60 ML/MIN/{1.73_M2}
GLUCOSE BLD-MCNC: 166 MG/DL (ref 70–99)
GLUCOSE BLD-MCNC: 166 MG/DL (ref 70–99)
GLUCOSE BLD-MCNC: 170 MG/DL (ref 70–99)
GLUCOSE BLD-MCNC: 181 MG/DL (ref 70–99)
GLUCOSE BLD-MCNC: 207 MG/DL (ref 70–99)
HBA1C MFR BLD: 5.7 %
HCT VFR BLD CALC: 33.3 % (ref 40.5–52.5)
HEMOGLOBIN: 11.1 G/DL (ref 13.5–17.5)
L. PNEUMOPHILA SEROGP 1 UR AG: NORMAL
LYMPHOCYTES ABSOLUTE: 0.6 K/UL (ref 1–5.1)
LYMPHOCYTES RELATIVE PERCENT: 2.6 %
MAGNESIUM: 2.2 MG/DL (ref 1.8–2.4)
MCH RBC QN AUTO: 30.2 PG (ref 26–34)
MCHC RBC AUTO-ENTMCNC: 33.2 G/DL (ref 31–36)
MCV RBC AUTO: 91 FL (ref 80–100)
MONOCYTES ABSOLUTE: 0.5 K/UL (ref 0–1.3)
MONOCYTES RELATIVE PERCENT: 2.4 %
NEUTROPHILS ABSOLUTE: 22.1 K/UL (ref 1.7–7.7)
NEUTROPHILS RELATIVE PERCENT: 94.9 %
PATH CONSULT FLUID: NORMAL
PDW BLD-RTO: 14.8 % (ref 12.4–15.4)
PERFORMED ON: ABNORMAL
PLATELET # BLD: 452 K/UL (ref 135–450)
PMV BLD AUTO: 7.9 FL (ref 5–10.5)
POTASSIUM SERPL-SCNC: 4.5 MMOL/L (ref 3.5–5.1)
RBC # BLD: 3.66 M/UL (ref 4.2–5.9)
REPORT: NORMAL
SODIUM BLD-SCNC: 139 MMOL/L (ref 136–145)
STREP PNEUMONIAE ANTIGEN, URINE: NORMAL
WBC # BLD: 23.3 K/UL (ref 4–11)

## 2023-01-09 PROCEDURE — 6370000000 HC RX 637 (ALT 250 FOR IP): Performed by: INTERNAL MEDICINE

## 2023-01-09 PROCEDURE — 6370000000 HC RX 637 (ALT 250 FOR IP): Performed by: NURSE PRACTITIONER

## 2023-01-09 PROCEDURE — 94761 N-INVAS EAR/PLS OXIMETRY MLT: CPT

## 2023-01-09 PROCEDURE — 1200000000 HC SEMI PRIVATE

## 2023-01-09 PROCEDURE — 2580000003 HC RX 258: Performed by: INTERNAL MEDICINE

## 2023-01-09 PROCEDURE — 2700000000 HC OXYGEN THERAPY PER DAY

## 2023-01-09 PROCEDURE — 83735 ASSAY OF MAGNESIUM: CPT

## 2023-01-09 PROCEDURE — 80048 BASIC METABOLIC PNL TOTAL CA: CPT

## 2023-01-09 PROCEDURE — 99223 1ST HOSP IP/OBS HIGH 75: CPT | Performed by: INTERNAL MEDICINE

## 2023-01-09 PROCEDURE — 6360000002 HC RX W HCPCS: Performed by: INTERNAL MEDICINE

## 2023-01-09 PROCEDURE — 85025 COMPLETE CBC W/AUTO DIFF WBC: CPT

## 2023-01-09 PROCEDURE — 36415 COLL VENOUS BLD VENIPUNCTURE: CPT

## 2023-01-09 RX ORDER — METRONIDAZOLE 250 MG/1
500 TABLET ORAL EVERY 8 HOURS SCHEDULED
Status: DISPENSED | OUTPATIENT
Start: 2023-01-09 | End: 2023-01-16

## 2023-01-09 RX ORDER — LACTOBACILLUS RHAMNOSUS GG 10B CELL
1 CAPSULE ORAL 2 TIMES DAILY WITH MEALS
Status: DISCONTINUED | OUTPATIENT
Start: 2023-01-09 | End: 2023-01-18 | Stop reason: HOSPADM

## 2023-01-09 RX ADMIN — GABAPENTIN 300 MG: 300 CAPSULE ORAL at 13:01

## 2023-01-09 RX ADMIN — AZITHROMYCIN MONOHYDRATE 250 MG: 250 TABLET ORAL at 08:22

## 2023-01-09 RX ADMIN — Medication 1 CAPSULE: at 13:02

## 2023-01-09 RX ADMIN — GABAPENTIN 300 MG: 300 CAPSULE ORAL at 19:42

## 2023-01-09 RX ADMIN — HYDROCODONE BITARTRATE AND ACETAMINOPHEN 1 TABLET: 5; 325 TABLET ORAL at 19:42

## 2023-01-09 RX ADMIN — METRONIDAZOLE 500 MG: 250 TABLET ORAL at 17:03

## 2023-01-09 RX ADMIN — Medication 3 MG: at 23:54

## 2023-01-09 RX ADMIN — HYDROCODONE BITARTRATE AND ACETAMINOPHEN 1 TABLET: 5; 325 TABLET ORAL at 07:26

## 2023-01-09 RX ADMIN — GABAPENTIN 300 MG: 300 CAPSULE ORAL at 08:22

## 2023-01-09 RX ADMIN — ASPIRIN 81 MG: 81 TABLET, COATED ORAL at 08:22

## 2023-01-09 RX ADMIN — Medication 1 CAPSULE: at 17:03

## 2023-01-09 RX ADMIN — CEFTRIAXONE SODIUM 1000 MG: 1 INJECTION, POWDER, FOR SOLUTION INTRAMUSCULAR; INTRAVENOUS at 10:18

## 2023-01-09 RX ADMIN — ENOXAPARIN SODIUM 40 MG: 100 INJECTION SUBCUTANEOUS at 08:22

## 2023-01-09 RX ADMIN — DEXTROAMPHETAMINE SACCHARATE, AMPHETAMINE ASPARTATE, DEXTROAMPHETAMINE SULFATE, AMPHETAMINE SULFATE TABLETS, 10 MG,CLL 30 MG: 2.5; 2.5; 2.5; 2.5 TABLET ORAL at 06:11

## 2023-01-09 RX ADMIN — METRONIDAZOLE 500 MG: 250 TABLET ORAL at 21:24

## 2023-01-09 RX ADMIN — SODIUM CHLORIDE: 9 INJECTION, SOLUTION INTRAVENOUS at 08:23

## 2023-01-09 RX ADMIN — DEXTROAMPHETAMINE SACCHARATE, AMPHETAMINE ASPARTATE, DEXTROAMPHETAMINE SULFATE, AMPHETAMINE SULFATE TABLETS, 10 MG,CLL 30 MG: 2.5; 2.5; 2.5; 2.5 TABLET ORAL at 15:32

## 2023-01-09 ASSESSMENT — PAIN SCALES - GENERAL
PAINLEVEL_OUTOF10: 8
PAINLEVEL_OUTOF10: 0
PAINLEVEL_OUTOF10: 7
PAINLEVEL_OUTOF10: 0

## 2023-01-09 ASSESSMENT — PAIN DESCRIPTION - LOCATION
LOCATION: CHEST
LOCATION: CHEST

## 2023-01-09 ASSESSMENT — PAIN DESCRIPTION - ORIENTATION
ORIENTATION: RIGHT
ORIENTATION: RIGHT

## 2023-01-09 ASSESSMENT — PAIN DESCRIPTION - ONSET: ONSET: GRADUAL

## 2023-01-09 ASSESSMENT — PAIN DESCRIPTION - DESCRIPTORS: DESCRIPTORS: DISCOMFORT

## 2023-01-09 ASSESSMENT — PAIN - FUNCTIONAL ASSESSMENT: PAIN_FUNCTIONAL_ASSESSMENT: ACTIVITIES ARE NOT PREVENTED

## 2023-01-09 ASSESSMENT — PAIN DESCRIPTION - FREQUENCY: FREQUENCY: CONTINUOUS

## 2023-01-09 ASSESSMENT — PAIN DESCRIPTION - PAIN TYPE: TYPE: ACUTE PAIN

## 2023-01-09 NOTE — PLAN OF CARE
Problem: Safety - Adult  Goal: Free from fall injury  1/9/2023 0010 by Janeth Mcgee RN  Outcome: Progressing  Note: Pt at risk for accidental injury. Pt will remain free from injury during admission. Pt educated on the use of the call light and the need to have an active bed alarm. Pt will call out prior to ambulation. Pt's pathway will remain free from tripping hazards. Pt's bed is in lowest position. Pt has no further needs at this time. Problem: Pain  Goal: Verbalizes/displays adequate comfort level or baseline comfort level  1/9/2023 0010 by Janeth Mcgee RN  Outcome: Progressing  Note: Pt reporting  8 /10 pain during pain assessment. Treating pain prn. Pt reports current pain plan is working well. Instructed pt to report any new onsets of pain. Will continue to monitor. Problem: Metabolic/Fluid and Electrolytes - Adult  Goal: Glucose maintained within prescribed range  Outcome: Progressing  Note: Pt at risk for elevated blood glucose levels r/t DM. Pt will have glucose levels monitored prior to breakfast, lunch, dinner, and bedtime. Elevated levels will be treated via SSI. Pt understands the need to monitor levels and has no further complaints at this time.

## 2023-01-09 NOTE — CONSULTS
Consult placed    Who:MD Gary paged via [employer]   Date:1/9/2023,  Time:9:42 AM        Electronically signed by Paulo Hill RN on 1/9/2023 at 9:42 AM

## 2023-01-09 NOTE — FLOWSHEET NOTE
Cross cover paged:  Pt takes gabapentin 300mg/TID that has not been ordered for this admission.  Can this be ordered? Thanks    Addendum:  Gabapentin 300mg/TID ordered and administered.

## 2023-01-09 NOTE — RT PROTOCOL NOTE
RT Inhaler-Nebulizer Bronchodilator Protocol Note    There is a bronchodilator order in the chart from a provider indicating to follow the RT Bronchodilator Protocol and there is an Initiate RT Inhaler-Nebulizer Bronchodilator Protocol order as well (see protocol at bottom of note). CXR Findings:  XR CHEST PORTABLE    Result Date: 1/8/2023  No pneumothorax identified. Redemonstration of bilateral airspace disease, right greater than left. XR CHEST PORTABLE    Result Date: 1/7/2023  Mild central pulmonary congestion Moderate airspace opacity right lung base and hazy opacities along the left upper lobe and left lung base which could represent multisegmental bronchopneumonia. Recommend short-term follow-up Small questionable small right pleural effusion. 1.6 cm nodule left lung base laterally which is more apparent could represent a small neoplasm. Recommend CT correlation       The findings from the last RT Protocol Assessment were as follows:   History Pulmonary Disease: Smoker 15 pack years or more  Respiratory Pattern: Regular pattern and RR 12-20 bpm  Breath Sounds: Slightly diminished and/or crackles  Cough: Strong, spontaneous, non-productive  Indication for Bronchodilator Therapy: On home bronchodilators  Bronchodilator Assessment Score: 3    Aerosolized bronchodilator medication orders have been revised according to the RT Inhaler-Nebulizer Bronchodilator Protocol below. Respiratory Therapist to perform RT Therapy Protocol Assessment initially then follow the protocol. Repeat RT Therapy Protocol Assessment PRN for score 0-3 or on second treatment, BID, and PRN for scores above 3. No Indications - adjust the frequency to every 6 hours PRN wheezing or bronchospasm, if no treatments needed after 48 hours then discontinue using Per Protocol order mode. If indication present, adjust the RT bronchodilator orders based on the Bronchodilator Assessment Score as indicated below.   Use Inhaler orders unless patient has one or more of the following: on home nebulizer, not able to hold breath for 10 seconds, is not alert and oriented, cannot activate and use MDI correctly, or respiratory rate 25 breaths per minute or more, then use the equivalent nebulizer order(s) with same Frequency and PRN reasons based on the score. If a patient is on this medication at home then do not decrease Frequency below that used at home. 0-3 - enter or revise RT bronchodilator order(s) to equivalent RT Bronchodilator order with Frequency of every 4 hours PRN for wheezing or increased work of breathing using Per Protocol order mode. 4-6 - enter or revise RT Bronchodilator order(s) to two equivalent RT bronchodilator orders with one order with BID Frequency and one order with Frequency of every 4 hours PRN wheezing or increased work of breathing using Per Protocol order mode. 7-10 - enter or revise RT Bronchodilator order(s) to two equivalent RT bronchodilator orders with one order with TID Frequency and one order with Frequency of every 4 hours PRN wheezing or increased work of breathing using Per Protocol order mode. 11-13 - enter or revise RT Bronchodilator order(s) to one equivalent RT bronchodilator order with QID Frequency and an Albuterol order with Frequency of every 4 hours PRN wheezing or increased work of breathing using Per Protocol order mode. Greater than 13 - enter or revise RT Bronchodilator order(s) to one equivalent RT bronchodilator order with every 4 hours Frequency and an Albuterol order with Frequency of every 2 hours PRN wheezing or increased work of breathing using Per Protocol order mode. RT to enter RT Home Evaluation for COPD & MDI Assessment order using Per Protocol order mode.     Electronically signed by Immanuel Cates RCP on 1/8/2023 at 9:04 PM

## 2023-01-09 NOTE — CARE COORDINATION
CASE MANAGEMENT INITIAL ASSESSMENT      Reviewed chart and completed assessment with patient  Family present: none    Health Care Decision Maker :     Armen Rivera - sister [phone]      Can this person be reached and be able to respond quickly, such as within a few minutes or hours? Yes      Admit date/status:1/7/23 Inpatient   Diagnosis:septicemia    Is this a Readmission?:  No      Insurance:[employer]    Precert required for SNF: Yes       3 night stay required: No    Living arrangements, Adls, care needs, prior to admission:lives in a house with his son    Bailey June at home: walking stick     Services in the home and/or outpatient, prior to [address]                     Transportation needs: patient drives      Dialysis Facility (if applicable)   Name:  Address:  Dialysis Schedule:  Phone:  Fax:    PT/OT recs:none    Hospital Exemption Notification (HEN):not initiated     Barriers to discharge:none    Plan/comments:Patient is independent at home. Please notify CM should any needs arise.       ECOC on chart for MD signature

## 2023-01-09 NOTE — CONSULTS
Infectious Diseases   Consult Note      Reason for Consult:  pneumococcal pneumonia and bacteremia   Requesting Physician:    Dr. Kathy Worthy    Date of Admission: 1/7/2023  Subjective:   CHIEF COMPLAINT:   none given       HPI:    Susette Epley is a 59yoM with history of HTN, GERD, smoker. C diff infection in 3/2020    ED 1/7/23 with ~1 week history worsening chest pain, cough, fever. WBC was elevated at 16  CXR with multifocal pneumonia and R effusion. Thoracentesis completed on 1/7/23, 300cc fluid removed. The procedure provided significant pain relief. Fluid described as turbid with abundant WBC noted. Pleural fluid culture is positive for alpha hemolytic strep. Blood cultures are positive for S pneumoniae. We are consulted for abx management. Current abx:  Azithromycin 250 po daily   Rocephin 1g q24       Past Surgical History:       Diagnosis Date    Anxiety     Clostridium difficile infection 03/23/2020    Clostridium difficile infection 03/23/2020    GERD (gastroesophageal reflux disease)     Hypertension          Procedure Laterality Date    ABDOMEN SURGERY      ANKLE SURGERY Right     HAND SURGERY Left     KNEE SURGERY Right     ROTATOR CUFF REPAIR Right        Social History:    TOBACCO:   reports that he has been smoking cigarettes. He has been smoking an average of .5 packs per day. He has never used smokeless tobacco.  ETOH:   reports no history of alcohol use. There is no history of illicit drug use or other significant epidemiologic exposures. Family History:   History reviewed. No pertinent family history. There is no family history of autoimmune diseases or significant infectious diseases.       Current Medications:    Current Facility-Administered Medications: aspirin EC tablet 81 mg, 81 mg, Oral, Daily  glucose chewable tablet 16 g, 4 tablet, Oral, PRN  dextrose bolus 10% 125 mL, 125 mL, IntraVENous, PRN **OR** dextrose bolus 10% 250 mL, 250 mL, IntraVENous, PRN  glucagon (rDNA) injection 1 mg, 1 mg, SubCUTAneous, PRN  dextrose 10 % infusion, , IntraVENous, Continuous PRN  sodium chloride flush 0.9 % injection 10 mL, 10 mL, IntraVENous, PRN  0.9 % sodium chloride infusion, , IntraVENous, PRN  potassium chloride (KLOR-CON M) extended release tablet 40 mEq, 40 mEq, Oral, PRN **OR** potassium bicarb-citric acid (EFFER-K) effervescent tablet 40 mEq, 40 mEq, Oral, PRN **OR** potassium chloride 10 mEq/100 mL IVPB (Peripheral Line), 10 mEq, IntraVENous, PRN  magnesium sulfate 1000 mg in dextrose 5% 100 mL IVPB, 1,000 mg, IntraVENous, PRN  enoxaparin (LOVENOX) injection 40 mg, 40 mg, SubCUTAneous, Daily  ondansetron (ZOFRAN-ODT) disintegrating tablet 4 mg, 4 mg, Oral, Q8H PRN **OR** ondansetron (ZOFRAN) injection 4 mg, 4 mg, IntraVENous, Q6H PRN  acetaminophen (TYLENOL) tablet 650 mg, 650 mg, Oral, Q6H PRN **OR** acetaminophen (TYLENOL) suppository 650 mg, 650 mg, Rectal, Q6H PRN  nicotine (NICODERM CQ) 14 MG/24HR 1 patch, 1 patch, TransDERmal, Daily  insulin lispro (HUMALOG) injection vial 0-4 Units, 0-4 Units, SubCUTAneous, TID WC  insulin lispro (HUMALOG) injection vial 0-4 Units, 0-4 Units, SubCUTAneous, Nightly  cefTRIAXone (ROCEPHIN) 1,000 mg in dextrose 5 % 50 mL IVPB mini-bag, 1,000 mg, IntraVENous, Q12H  azithromycin (ZITHROMAX) tablet 250 mg, 250 mg, Oral, Daily  melatonin tablet 3 mg, 3 mg, Oral, Nightly PRN  calcium carbonate (TUMS) chewable tablet 1,000 mg, 1,000 mg, Oral, TID PRN  HYDROcodone-acetaminophen (NORCO) 5-325 MG per tablet 1 tablet, 1 tablet, Oral, Q6H PRN  0.9 % sodium chloride infusion, , IntraVENous, Continuous  amphetamine-dextroamphetamine (ADDERALL) tablet 30 mg, 30 mg, Oral, BID AC  ipratropium-albuterol (DUONEB) nebulizer solution 1 ampule, 1 ampule, Inhalation, Q4H PRN  gabapentin (NEURONTIN) capsule 300 mg, 300 mg, Oral, TID  HYDROmorphone (DILAUDID) injection 1 mg, 1 mg, IntraVENous, Q3H PRN  albuterol (PROVENTIL) nebulizer solution 2.5 mg, 2.5 mg, Nebulization, Q4H PRN  benzonatate (TESSALON) capsule 200 mg, 200 mg, Oral, TID PRN      No Known Allergies       REVIEW OF SYSTEMS:    CONSTITUTIONAL:   per HPI    EYES:  negative for blurred vision, eye discharge, visual disturbance and icterus  HEENT:  negative for hearing loss, tinnitus, ear drainage, sinus pressure, nasal congestion, epistaxis and snoring  RESPIRATORY:   per HPI   No hemoptysis  CARDIOVASCULAR:  negative for palpitations, exertional chest pressure/discomfort, edema, syncope  GASTROINTESTINAL:  negative for nausea, vomiting, diarrhea, constipation, blood in stool and abdominal pain  GENITOURINARY:  negative for frequency, dysuria, urinary incontinence, decreased urine volume, and hematuria  HEMATOLOGIC/LYMPHATIC:  negative for easy bruising, bleeding and lymphadenopathy  ALLERGIC/IMMUNOLOGIC:  negative for recurrent infections, angioedema, anaphylaxis and drug reactions  ENDOCRINE:  negative for weight changes and diabetic symptoms including polyuria, polydipsia and polyphagia  MUSCULOSKELETAL:  negative for acute joint swelling, decreased range of motion and muscle weakness  NEUROLOGICAL:  negative for headaches, slurred speech, unilateral weakness  PSYCHIATRIC/BEHAVIORAL: negative for hallucinations, behavioral problems, confusion and agitation. Objective:   PHYSICAL EXAM:      VITALS:  BP (!) 134/90   Pulse 86   Temp 97.5 °F (36.4 °C) (Oral)   Resp 18   Ht 5' 8\" (1.727 m)   Wt 163 lb 1.6 oz (74 kg)   SpO2 93%   BMI 24.80 kg/m²      24HR INTAKE/OUTPUT:    Intake/Output Summary (Last 24 hours) at 1/9/2023 1008  Last data filed at 1/9/2023 4608  Gross per 24 hour   Intake 3588.57 ml   Output 300 ml   Net 3288.57 ml     CONSTITUTIONAL:  Awake, alert, cooperative, no apparent distress, and appears stated age  [de-identified]: NCAT, PERRL, EOMI. Sclera white, conjunctiva full.   OP with moist mucosal membranes, no thrush, tongue protrudes midline  NECK:  Supple, symmetrical, trachea midline, no adenopathy  LUNGS:  no increased work of breathing   Rales, rub R base posteriorly   No wheezing  Breathing non-labored   CARDIOVASCULAR:  RRR without murmur  ABDOMEN:  normal bowel sounds, soft, flat, NT  PSYCHIATRIC: Oriented to person place and time. No obvious depression or anxiety. MUSCULOSKELETAL: No obvious misalignment or effusion of the joints. No clubbing, cyanosis of the digits. SKIN:  normal skin color, texture, turgor and no redness, warmth, or swelling. No palpable nodules or stigmata of embolic phenomenon  NEUROLOGIC: nonfocal exam    ACCESS:  PIV in place      DATA:    Old records have been reviewed    CBC:  Recent Labs     01/07/23 2048 01/08/23  0558 01/09/23  0659   WBC 16.9* 22.1* 23.3*   RBC 3.75* 4.07* 3.66*   HGB 11.4* 12.1* 11.1*   HCT 34.2* 37.1* 33.3*   * 496* 452*   MCV 91.4 91.3 91.0   MCH 30.4 29.8 30.2   MCHC 33.3 32.6 33.2   RDW 14.5 14.8 14.8   BANDSPCT 24*  --   --       BMP:  Recent Labs     01/07/23 2048 01/08/23 0558 01/09/23  0659   * 136 139   K 4.9 4.0 4.5   CL 98* 98* 100   CO2 25 26 28   BUN 22* 24* 34*   CREATININE <0.5* <0.5* <0.5*   CALCIUM 8.3 9.0 8.1*   GLUCOSE 136* 159* 170*        Cultures:   1/7 Flu and COVID PCR neg    BC x2 S pneumoniae    Pleural fluid culture neg to date   1/8 Legionella and pneumococcal ag neg    Sputum cx in process   MRSA nasal screen in process      Radiology Review:  All pertinent images / reports were reviewed as a part of this visit. CXR 1/7/23  Impression   Mild central pulmonary congestion       Moderate airspace opacity right lung base and hazy opacities along the left   upper lobe and left lung base which could represent multisegmental   bronchopneumonia. Recommend short-term follow-up       Small questionable small right pleural effusion. 1.6 cm nodule left lung base laterally which is more apparent could represent   a small neoplasm.   Recommend CT correlation     CT chest 1/8/23  Impression Multifocal pneumonia,right greater than left with tiny pleural effusions. A more focal nodular density in the left lower lobe is indeterminate   measuring 1.2 cm in size. Recommend short-term follow-up chest CT after   treatment approximately 4-6 weeks time to assess for change. Small mediastinal nodes are seen which may simply be reactive. Assessment:     Patient Active Problem List   Diagnosis    Hypoxia    Bronchitis    CAP (community acquired pneumonia) due to Chlamydia species    DM2 (diabetes mellitus, type 2) ([address]    Tobacco abuse    ADHD    History of sleeve gastrectomy    Pneumonia of right lower lobe due to infectious organism       History of smoking, HTN    Admitted 1/7/23 with severe multifocal pneumococcal pneumonia with bacteremia and empyema   Multifocal infiltrates  S/p thoracentesis in ED with turbid fluid and growth of alpha-hemolytic strep in culture (presume this will also be S pneumoniae)    Nodular density LLL of unclear significane for which R  adiology has suggested short term follow-up CT     Rising WBC, somewhat incongruent with the rest of the clinical picture  Fever has resolved. He is no longer requiring supplemental oxygen   May be effect of solumedrol that was stopped today (last dose 1/8 PM)     History of C diff infection 3/2020 (+PCR, Rx vanc, no recurrence)    NKDA       -DC azithro  -rocephin dose adjusted   -add flagyl since empyema is sometimes polymicrobial   -probiotic  -trend WBC, anticipating decline now that steroid was stopped  -Pa/lat CXR in AM.  Would need thoracostomy tube drainage if fluid re-accumulates   -anticipate 2-3 weeks total abx, likely will change to po abx at DC   -repeat BC in AM     All above d/w patient, questions addressed  D/w THIAGO Mullins M.D. Thank you for the opportunity to participate in the care of your patient.     Please do not hesitate to contact me:   [phone] office

## 2023-01-09 NOTE — PROGRESS NOTES
Hospitalist Progress Note      PCP: Agustina Hallman MD    Date of Admission: 1/7/2023    Chief Complaint: Fatigue    Hospital Course:   Leopold Long is a 65 y.o. male. He presented to the [facility] from home. He c/o feeling ill for about a week. He describes feeling constitutionally ill w/ fatigue, malaise, loss of appetite. Along the way he has had a worsening cough, worsening dyspnea, and pretty marked right-sided pleuritic chest pain along the lateral and anterolater right costal margin. CXR with mild central pulmonary congestion. Small right pleural effusion. Thoracentesis with 300 ml fluid removed. Subjective: Patient starting to feel better. His dyspnea is improving.  Blood cultures positive for strep      Medications:  Reviewed    Infusion Medications    dextrose      sodium chloride Stopped (01/08/23 1209)    sodium chloride 100 mL/hr at 01/09/23 0453     Scheduled Medications    aspirin  81 mg Oral Daily    enoxaparin  40 mg SubCUTAneous Daily    nicotine  1 patch TransDERmal Daily    insulin lispro  0-4 Units SubCUTAneous TID WC    insulin lispro  0-4 Units SubCUTAneous Nightly    cefTRIAXone (ROCEPHIN) IV  1,000 mg IntraVENous Q12H    azithromycin  250 mg Oral Daily    amphetamine-dextroamphetamine  30 mg Oral BID AC    gabapentin  300 mg Oral TID    methylPREDNISolone  40 mg IntraVENous Q12H     PRN Meds: glucose, dextrose bolus **OR** dextrose bolus, glucagon (rDNA), dextrose, sodium chloride flush, sodium chloride, potassium chloride **OR** potassium alternative oral replacement **OR** potassium chloride, magnesium sulfate, ondansetron **OR** ondansetron, acetaminophen **OR** acetaminophen, melatonin, calcium carbonate, HYDROcodone 5 mg - acetaminophen, ipratropium-albuterol, HYDROmorphone, albuterol, benzonatate      Intake/Output Summary (Last 24 hours) at 1/9/2023 0823  Last data filed at 1/9/2023 0453  Gross per 24 hour   Intake 3348.57 ml   Output 300 ml   Net 3048.57 ml Physical Exam Performed:    BP (!) 134/90   Pulse 86   Temp 97.5 °F (36.4 °C) (Oral)   Resp 18   Ht 5' 8\" (1.727 m)   Wt 163 lb 1.6 oz (74 kg)   SpO2 93%   BMI 24.80 kg/m²     General appearance: No apparent distress, appears stated age and cooperative. HEENT: Pupils equal, round, and reactive to light. Conjunctivae/corneas clear. Neck: Supple, with full range of motion. No jugular venous distention. Trachea midline. Respiratory:  Normal respiratory effort. Clear to auscultation, bilaterally without Rales/Wheezes/Rhonchi. Cardiovascular: Regular rate and rhythm with normal S1/S2 without murmurs, rubs or gallops. Abdomen: Soft, non-tender, non-distended with normal bowel sounds. Musculoskeletal: No clubbing, cyanosis or edema bilaterally. Full range of motion without deformity. Skin: Skin color, texture, turgor normal.  No rashes or lesions. Neurologic:  Neurovascularly intact without any focal sensory/motor deficits. Cranial nerves: II-XII intact, grossly non-focal.  Psychiatric: Alert and oriented, thought content appropriate, normal insight  Capillary Refill: Brisk, 3 seconds, normal   Peripheral Pulses: +2 palpable, equal bilaterally       Labs:   Recent Labs     01/07/23 2048 01/08/23 0558 01/09/23  0659   WBC 16.9* 22.1* 23.3*   HGB 11.4* 12.1* 11.1*   HCT 34.2* 37.1* 33.3*   * 496* 452*     Recent Labs     01/07/23 2048 01/08/23  0558 01/09/23  0659   * 136 139   K 4.9 4.0 4.5   CL 98* 98* 100   CO2 25 26 28   BUN 22* 24* 34*   CREATININE <0.5* <0.5* <0.5*   CALCIUM 8.3 9.0 8.1*     No results for input(s): AST, ALT, BILIDIR, BILITOT, ALKPHOS in the last 72 hours.   Recent Labs     01/07/23  2240   INR 1.32*     Recent Labs     01/07/23 2048   TROPONINI <0.01       Urinalysis:      Lab Results   Component Value Date/Time    NITRU Negative 07/23/2021 04:28 PM    [address] 0-2 07/23/2021 04:28 PM    RBCUA 3-4 07/23/2021 04:28 PM    BLOODU Negative 07/23/2021 04:28 PM    SPECGRAV >=1.030 07/23/2021 04:28 PM    GLUCOSEU Negative 07/23/2021 04:28 PM       Radiology:  CT CHEST W CONTRAST   Final Result   Multifocal pneumonia,right greater than left with tiny pleural effusions. A more focal nodular density in the left lower lobe is indeterminate   measuring 1.2 cm in size. Recommend short-term follow-up chest CT after   treatment approximately 4-6 weeks time to assess for change. Small mediastinal nodes are seen which may simply be reactive. RECOMMENDATIONS:   *pulmonary management*         XR CHEST PORTABLE   Final Result   No pneumothorax identified. Redemonstration of bilateral airspace disease,   right greater than left. XR CHEST PORTABLE   Final Result   Mild central pulmonary congestion      Moderate airspace opacity right lung base and hazy opacities along the left   upper lobe and left lung base which could represent multisegmental   bronchopneumonia. Recommend short-term follow-up      Small questionable small right pleural effusion. 1.6 cm nodule left lung base laterally which is more apparent could represent   a small neoplasm. Recommend CT correlation             IP CONSULT TO HOSPITALIST    Assessment/Plan:    Active Hospital Problems    Diagnosis     DM2 (diabetes mellitus, type 2) ([facility] [address] [E11.9]      Priority: Medium    Tobacco abuse [Z72.0]      Priority: Medium    ADHD [F90.9]      Priority: Medium    History of sleeve gastrectomy [Z90.3]      Priority: Medium    Pneumonia of right lower lobe due to infectious organism [J18.9]      Priority: Medium     Community-acquired pneumonia: Continue IV Rocephin DC azithromycin. Pneumonia complicated by parapneumonic effusion. Continue supplemental oxygen. As needed pain medication for related pain    Strep pneumoniae bacteremia - Ceftriaxone. ID consulted     Sepsis - w/ Leukocytosis/Tachycardia POArrival 2nd to above infection. Continue IVF as appropriate and monitor clinical response w/ ABX as written. Right pleural effusion: Status postthoracentesis yesterday in the ER. Gram stain showed Gram positive cocci. Pleural fluid analysis pending     Acute hypoxic respiratory failure: Secondary to severe pneumonia. Currently requiring 3.5 L of NC O2, was not on home O2 prior to this. Continue supplemental oxygen and wean off as able. Pulmonary nodule: 1.6 cm nodule left lung base, concerning for small neoplasm on chest x-ray. We will get CT chest to further evaluate. DM type II: Continue subcu insulin regimen including long-acting and short acting insulin as ordered, continue SSI protocol for more adequate glycemic control. ADHD: Continue outpatient Adderall    Chronic back pain - continue gabapentin    DVT Prophylaxis: Lovenox  Diet: ADULT DIET; Regular  Code Status: Full Code  PT/OT Eval Status: Not indicated    Dispo - Pending clinical improvement.  Expect > 2 days     Appropriate for A1 Discharge Unit: Keren Rojas DO

## 2023-01-10 ENCOUNTER — APPOINTMENT (OUTPATIENT)
Dept: GENERAL RADIOLOGY | Age: 63
DRG: 871 | End: 2023-01-10
Payer: MEDICARE

## 2023-01-10 PROBLEM — R78.81 BACTEREMIA DUE TO STREPTOCOCCUS: Status: ACTIVE | Noted: 2023-01-10

## 2023-01-10 PROBLEM — I10 UNCONTROLLED HYPERTENSION: Status: ACTIVE | Noted: 2023-01-10

## 2023-01-10 PROBLEM — D75.839 THROMBOCYTOSIS: Status: ACTIVE | Noted: 2023-01-10

## 2023-01-10 PROBLEM — D64.9 NORMOCYTIC NORMOCHROMIC ANEMIA: Status: ACTIVE | Noted: 2023-01-10

## 2023-01-10 PROBLEM — R79.9 ELEVATED BUN: Status: ACTIVE | Noted: 2023-01-10

## 2023-01-10 PROBLEM — R07.81 PLEURITIC CHEST PAIN: Status: ACTIVE | Noted: 2023-01-10

## 2023-01-10 PROBLEM — R04.2 HEMOPTYSIS: Status: ACTIVE | Noted: 2023-01-10

## 2023-01-10 PROBLEM — D72.825 BANDEMIA: Status: ACTIVE | Noted: 2023-01-10

## 2023-01-10 PROBLEM — B95.5 BACTEREMIA DUE TO STREPTOCOCCUS: Status: ACTIVE | Noted: 2023-01-10

## 2023-01-10 PROBLEM — R79.89 ELEVATED PROCALCITONIN: Status: ACTIVE | Noted: 2023-01-10

## 2023-01-10 LAB
ANION GAP SERPL CALCULATED.3IONS-SCNC: 8 MMOL/L (ref 3–16)
BANDED NEUTROPHILS RELATIVE PERCENT: 2 % (ref 0–7)
BASOPHILS ABSOLUTE: 0 K/UL (ref 0–0.2)
BASOPHILS RELATIVE PERCENT: 0 %
BODY FLUID CULTURE, STERILE: ABNORMAL
BUN BLDV-MCNC: 36 MG/DL (ref 7–20)
CALCIUM SERPL-MCNC: 8 MG/DL (ref 8.3–10.6)
CHLORIDE BLD-SCNC: 104 MMOL/L (ref 99–110)
CO2: 27 MMOL/L (ref 21–32)
CREAT SERPL-MCNC: <0.5 MG/DL (ref 0.8–1.3)
CULTURE, RESPIRATORY: NORMAL
EOSINOPHILS ABSOLUTE: 0 K/UL (ref 0–0.6)
EOSINOPHILS RELATIVE PERCENT: 0 %
GFR SERPL CREATININE-BSD FRML MDRD: >60 ML/MIN/{1.73_M2}
GLUCOSE BLD-MCNC: 138 MG/DL (ref 70–99)
GLUCOSE BLD-MCNC: 171 MG/DL (ref 70–99)
GLUCOSE BLD-MCNC: 87 MG/DL (ref 70–99)
GLUCOSE BLD-MCNC: 88 MG/DL (ref 70–99)
GLUCOSE BLD-MCNC: 91 MG/DL (ref 70–99)
GRAM STAIN RESULT: ABNORMAL
GRAM STAIN RESULT: NORMAL
HCT VFR BLD CALC: 32.1 % (ref 40.5–52.5)
HEMOGLOBIN: 10.4 G/DL (ref 13.5–17.5)
LYMPHOCYTES ABSOLUTE: 1.9 K/UL (ref 1–5.1)
LYMPHOCYTES RELATIVE PERCENT: 10 %
MAGNESIUM: 2.4 MG/DL (ref 1.8–2.4)
MCH RBC QN AUTO: 29.5 PG (ref 26–34)
MCHC RBC AUTO-ENTMCNC: 32.6 G/DL (ref 31–36)
MCV RBC AUTO: 90.6 FL (ref 80–100)
MONOCYTES ABSOLUTE: 0.6 K/UL (ref 0–1.3)
MONOCYTES RELATIVE PERCENT: 3 %
MRSA SCREEN RT-PCR: NORMAL
NEUTROPHILS ABSOLUTE: 16.6 K/UL (ref 1.7–7.7)
NEUTROPHILS RELATIVE PERCENT: 85 %
ORGANISM: ABNORMAL
PDW BLD-RTO: 14.7 % (ref 12.4–15.4)
PERFORMED ON: ABNORMAL
PERFORMED ON: ABNORMAL
PERFORMED ON: NORMAL
PERFORMED ON: NORMAL
PLATELET # BLD: 459 K/UL (ref 135–450)
PLATELET SLIDE REVIEW: ABNORMAL
PMV BLD AUTO: 7.4 FL (ref 5–10.5)
POTASSIUM SERPL-SCNC: 4.3 MMOL/L (ref 3.5–5.1)
PROCALCITONIN: 1.64 NG/ML (ref 0–0.15)
RBC # BLD: 3.54 M/UL (ref 4.2–5.9)
SLIDE REVIEW: ABNORMAL
SODIUM BLD-SCNC: 139 MMOL/L (ref 136–145)
WBC # BLD: 19.1 K/UL (ref 4–11)

## 2023-01-10 PROCEDURE — 6370000000 HC RX 637 (ALT 250 FOR IP): Performed by: NURSE PRACTITIONER

## 2023-01-10 PROCEDURE — 6370000000 HC RX 637 (ALT 250 FOR IP): Performed by: INTERNAL MEDICINE

## 2023-01-10 PROCEDURE — 85025 COMPLETE CBC W/AUTO DIFF WBC: CPT

## 2023-01-10 PROCEDURE — 36415 COLL VENOUS BLD VENIPUNCTURE: CPT

## 2023-01-10 PROCEDURE — 6360000002 HC RX W HCPCS: Performed by: INTERNAL MEDICINE

## 2023-01-10 PROCEDURE — 99232 SBSQ HOSP IP/OBS MODERATE 35: CPT | Performed by: INTERNAL MEDICINE

## 2023-01-10 PROCEDURE — 84145 PROCALCITONIN (PCT): CPT

## 2023-01-10 PROCEDURE — 71046 X-RAY EXAM CHEST 2 VIEWS: CPT

## 2023-01-10 PROCEDURE — 1200000000 HC SEMI PRIVATE

## 2023-01-10 PROCEDURE — 99223 1ST HOSP IP/OBS HIGH 75: CPT | Performed by: INTERNAL MEDICINE

## 2023-01-10 PROCEDURE — 87040 BLOOD CULTURE FOR BACTERIA: CPT

## 2023-01-10 PROCEDURE — 2580000003 HC RX 258: Performed by: INTERNAL MEDICINE

## 2023-01-10 PROCEDURE — 83735 ASSAY OF MAGNESIUM: CPT

## 2023-01-10 PROCEDURE — 80048 BASIC METABOLIC PNL TOTAL CA: CPT

## 2023-01-10 RX ORDER — IPRATROPIUM BROMIDE AND ALBUTEROL SULFATE 2.5; .5 MG/3ML; MG/3ML
1 SOLUTION RESPIRATORY (INHALATION) 4 TIMES DAILY
Status: DISCONTINUED | OUTPATIENT
Start: 2023-01-10 | End: 2023-01-11

## 2023-01-10 RX ORDER — LISINOPRIL 10 MG/1
10 TABLET ORAL DAILY
Status: DISCONTINUED | OUTPATIENT
Start: 2023-01-10 | End: 2023-01-15

## 2023-01-10 RX ADMIN — GABAPENTIN 300 MG: 300 CAPSULE ORAL at 14:33

## 2023-01-10 RX ADMIN — Medication 1 CAPSULE: at 16:55

## 2023-01-10 RX ADMIN — METRONIDAZOLE 500 MG: 250 TABLET ORAL at 04:35

## 2023-01-10 RX ADMIN — HYDROCODONE BITARTRATE AND ACETAMINOPHEN 1 TABLET: 5; 325 TABLET ORAL at 04:36

## 2023-01-10 RX ADMIN — METRONIDAZOLE 500 MG: 250 TABLET ORAL at 14:33

## 2023-01-10 RX ADMIN — HYDROMORPHONE HYDROCHLORIDE 1 MG: 1 INJECTION, SOLUTION INTRAMUSCULAR; INTRAVENOUS; SUBCUTANEOUS at 10:00

## 2023-01-10 RX ADMIN — GABAPENTIN 300 MG: 300 CAPSULE ORAL at 20:58

## 2023-01-10 RX ADMIN — METRONIDAZOLE 500 MG: 250 TABLET ORAL at 20:58

## 2023-01-10 RX ADMIN — HYDROMORPHONE HYDROCHLORIDE 1 MG: 1 INJECTION, SOLUTION INTRAMUSCULAR; INTRAVENOUS; SUBCUTANEOUS at 20:58

## 2023-01-10 RX ADMIN — Medication 10 ML: at 20:58

## 2023-01-10 RX ADMIN — HYDROMORPHONE HYDROCHLORIDE 1 MG: 1 INJECTION, SOLUTION INTRAMUSCULAR; INTRAVENOUS; SUBCUTANEOUS at 00:12

## 2023-01-10 RX ADMIN — HYDROMORPHONE HYDROCHLORIDE 1 MG: 1 INJECTION, SOLUTION INTRAMUSCULAR; INTRAVENOUS; SUBCUTANEOUS at 14:50

## 2023-01-10 RX ADMIN — LISINOPRIL 10 MG: 10 TABLET ORAL at 10:00

## 2023-01-10 RX ADMIN — GABAPENTIN 300 MG: 300 CAPSULE ORAL at 08:57

## 2023-01-10 RX ADMIN — DEXTROAMPHETAMINE SACCHARATE, AMPHETAMINE ASPARTATE, DEXTROAMPHETAMINE SULFATE, AMPHETAMINE SULFATE TABLETS, 10 MG,CLL 30 MG: 2.5; 2.5; 2.5; 2.5 TABLET ORAL at 04:35

## 2023-01-10 RX ADMIN — ASPIRIN 81 MG: 81 TABLET, COATED ORAL at 08:56

## 2023-01-10 RX ADMIN — ENOXAPARIN SODIUM 40 MG: 100 INJECTION SUBCUTANEOUS at 08:56

## 2023-01-10 RX ADMIN — Medication 10 ML: at 08:56

## 2023-01-10 RX ADMIN — DEXTROAMPHETAMINE SACCHARATE, AMPHETAMINE ASPARTATE, DEXTROAMPHETAMINE SULFATE, AMPHETAMINE SULFATE TABLETS, 10 MG,CLL 30 MG: 2.5; 2.5; 2.5; 2.5 TABLET ORAL at 16:55

## 2023-01-10 RX ADMIN — Medication 1 CAPSULE: at 08:56

## 2023-01-10 RX ADMIN — CEFTRIAXONE 2000 MG: 2 INJECTION, POWDER, FOR SOLUTION INTRAMUSCULAR; INTRAVENOUS at 10:10

## 2023-01-10 ASSESSMENT — PAIN SCALES - GENERAL
PAINLEVEL_OUTOF10: 8
PAINLEVEL_OUTOF10: 0
PAINLEVEL_OUTOF10: 5
PAINLEVEL_OUTOF10: 8
PAINLEVEL_OUTOF10: 8
PAINLEVEL_OUTOF10: 6

## 2023-01-10 ASSESSMENT — PAIN DESCRIPTION - ORIENTATION
ORIENTATION: RIGHT

## 2023-01-10 ASSESSMENT — PAIN SCALES - WONG BAKER
WONGBAKER_NUMERICALRESPONSE: 0
WONGBAKER_NUMERICALRESPONSE: 0

## 2023-01-10 ASSESSMENT — PAIN DESCRIPTION - DESCRIPTORS
DESCRIPTORS: DISCOMFORT;PRESSURE;SHOOTING
DESCRIPTORS: DISCOMFORT;PRESSURE
DESCRIPTORS: DISCOMFORT;PRESSURE

## 2023-01-10 ASSESSMENT — PAIN DESCRIPTION - PAIN TYPE
TYPE: ACUTE PAIN
TYPE: ACUTE PAIN

## 2023-01-10 ASSESSMENT — PAIN - FUNCTIONAL ASSESSMENT
PAIN_FUNCTIONAL_ASSESSMENT: PREVENTS OR INTERFERES SOME ACTIVE ACTIVITIES AND ADLS

## 2023-01-10 ASSESSMENT — PAIN DESCRIPTION - LOCATION
LOCATION: CHEST
LOCATION: RIB CAGE
LOCATION: RIB CAGE;CHEST
LOCATION: BACK

## 2023-01-10 NOTE — CONSULTS
INPATIENT PULMONARY CRITICAL CARE CONSULT NOTE      Chief Complaint/Referring Provider:  Patient is being seen at the request of Dr. Meera Taylor for a consultation for Multifocal pneumonia with emphyema      Presenting HPI: Patient presented to the hospital because of chest pain along with shortness of breath and cough    As per ER provider-Brian WAGNER Covert is a 65 y.o. male who presents to the emergency department with a chief complaint of some shortness of breath, cough that is mostly nonproductive with some right-sided chest discomfort. This is been ongoing for the past few days. States has been having fevers as high as 104 at home. Does smoke a half a pack of cigarettes a day. Denies any known history of COPD or asthma or lung disease. Denies any recent sick contacts or recent travel. Denies abdominal discomfort or urinary or bowel symptoms.      Patient continues to be symptomatic and the pulmonary consult was requested to evaluate the patient  Patient when seen states that he has not been well for last 2 weeks time, patient states that it started with URI like symptoms and patient states that intermittently he was feeling better but then symptoms became worse and progressive in nature and patient was having cough with dark-colored expectoration along with some blood-tinged secretions along with that patient was having significant right-sided pleuritic chest pain going to the back which was not allowing him to take a deep breath, patient also had a fever of 104 °F at 1 point, patient states that his symptoms continue to be present and patient does not seem that he is improving at this time, patient states that he does not have any epistaxis, patient has some sore throat, patient does not have any periodontitis; patient does not have any odynophagia or dysphagia, patient does not have any sensation as if the food is getting stuck in the middle of the chest, patient denies any significant vomiting, any hematochezia or melena, no increasing leg edema, patient does not have any history of any excessive weight loss in the last few months time, patient does not have any sick contacts, patient does not have any change in the ambient environment, patient does not have any pets, patient used to be a , patient has had a history of pneumonia 5 years back but patient states that the symptoms are much worse as compared to what they were at that time, patient does have history of smoking but he smokes only half a pack a day, patient continues to be congested, patient does not have any confusion lethargy, no other pertinent review of system of concern     Patient Active Problem List    Diagnosis Date Noted    Bandemia 01/10/2023    Thrombocytosis 01/10/2023    Normocytic normochromic anemia 01/10/2023    Uncontrolled hypertension 01/10/2023    Elevated BUN 01/10/2023    Elevated procalcitonin 01/10/2023    Pleuritic chest pain 01/10/2023    Hemoptysis 01/10/2023    Bacteremia due to Streptococcus 01/10/2023    Pneumonia of right lower lobe due to Streptococcus pneumoniae ([facility] [address] 01/09/2023    Empyema ([facility] [address] 01/09/2023    Leukocytosis 01/09/2023    DM2 (diabetes mellitus, type 2) ([facility] [address] 01/07/2023    Smoker 01/07/2023    ADHD 01/07/2023    History of sleeve gastrectomy 01/07/2023    Pneumonia of right lower lobe due to infectious organism 01/07/2023    Hypoxia 12/13/2017    Bronchitis 12/13/2017    CAP (community acquired pneumonia) due to Chlamydia species 12/13/2017       Past Medical History:   Diagnosis Date    Anxiety     Clostridium difficile infection 03/23/2020    Clostridium difficile infection 03/23/2020    GERD (gastroesophageal reflux disease)     Hypertension         Past Surgical History:   Procedure Laterality Date    ABDOMEN SURGERY      ANKLE SURGERY Right     HAND SURGERY Left     KNEE SURGERY Right     ROTATOR CUFF REPAIR Right         History reviewed. No pertinent family history.      Social History Tobacco Use    Smoking status: Every Day     Packs/day: 0.50     Types: Cigarettes    Smokeless tobacco: Never   Substance Use Topics    Alcohol use: No        No Known Allergies            Physical Exam:  Blood pressure (!) 167/101, pulse 88, temperature 99.1 °F (37.3 °C), temperature source Oral, resp. rate 22, height 5' 8\" (1.727 m), weight 165 lb 1.6 oz (74.9 kg), SpO2 94 %.'     Constitutional: Mild respiratory distress, audible chest congestion  HENT:  Oropharynx is clear and moist. No thyromegaly. Eyes:  Conjunctivae are normal. Pupils equal, round, and reactive to light. No scleral icterus. Neck: . No tracheal deviation present. No obvious thyroid mass. Cardiovascular: Normal rate, regular rhythm, normal heart sounds. No right ventricular heave. No lower extremity edema. Pulmonary/Chest: No wheezes. Bilateral coarse rales(Rt>Lt). Chest wall is not dull to percussion. No accessory muscle usage or stridor. Decreased breath sound intensity  Abdominal: Soft. Bowel sounds present. No distension or hernia. No tenderness. Musculoskeletal: No cyanosis. No clubbing. No obvious joint deformity. Lymphadenopathy: No cervical or supraclavicular adenopathy. Skin: Skin is warm and dry. No rash or nodules on the exposed extremities. Psychiatric: Normal mood and affect. Behavior is normal.  No anxiety. Neurologic: Alert, awake and oriented. PERRL. Speech fluent        Results:  CBC:   Recent Labs     01/08/23  0558 01/09/23  0659 01/10/23  0736   WBC 22.1* 23.3* 19.1*   HGB 12.1* 11.1* 10.4*   HCT 37.1* 33.3* 32.1*   MCV 91.3 91.0 90.6   * 452* 459*     BMP:   Recent Labs     01/08/23  0558 01/09/23  0659 01/10/23  0736    139 139   K 4.0 4.5 4.3   CL 98* 100 104   CO2 26 28 27   BUN 24* 34* 36*   CREATININE <0.5* <0.5* <0.5*       PT/INR:   Recent Labs     01/07/23  2240   PROTIME 16.4*   INR 1.32*         Imaging:  I have reviewed radiology images personally.     XR CHEST (2 VW) Final Result   Bilateral pneumonias without any improvement. Follow-up advised. CT CHEST W CONTRAST   Final Result   Multifocal pneumonia,right greater than left with tiny pleural effusions. A more focal nodular density in the left lower lobe is indeterminate   measuring 1.2 cm in size. Recommend short-term follow-up chest CT after   treatment approximately 4-6 weeks time to assess for change. Small mediastinal nodes are seen which may simply be reactive. RECOMMENDATIONS:   *pulmonary management*         XR CHEST PORTABLE   Final Result   No pneumothorax identified. Redemonstration of bilateral airspace disease,   right greater than left. XR CHEST PORTABLE   Final Result   Mild central pulmonary congestion      Moderate airspace opacity right lung base and hazy opacities along the left   upper lobe and left lung base which could represent multisegmental   bronchopneumonia. Recommend short-term follow-up      Small questionable small right pleural effusion. 1.6 cm nodule left lung base laterally which is more apparent could represent   a small neoplasm. Recommend CT correlation           XR CHEST (2 VW)    Result Date: 1/10/2023  EXAMINATION: TWO XRAY VIEWS OF THE CHEST 1/10/2023 7:38 am COMPARISON: January 7, 2023 HISTORY: ORDERING SYSTEM PROVIDED HISTORY: pna, empyema TECHNOLOGIST PROVIDED HISTORY: Reason for exam:->pna, empyema FINDINGS: Large consolidation right lower lobe of the lung and moderate consolidation left perihilar region. Left lung base infiltrate. Small right pleural effusion. There is no pneumothorax. These findings are largely unchanged. Normal cardiac size. Moderate osteopenic changes and degenerative changes noted in the bony structures. Bilateral pneumonias without any improvement. Follow-up advised.      CT CHEST W CONTRAST    Result Date: 1/8/2023  EXAMINATION: CT OF THE CHEST WITH CONTRAST 1/8/2023 1:20 pm TECHNIQUE: CT of the chest was performed with the administration of intravenous contrast. Multiplanar reformatted images are provided for review. Automated exposure control, iterative reconstruction, and/or weight based adjustment of the mA/kV was utilized to reduce the radiation dose to as low as reasonably achievable. COMPARISON: Recent chest x-ray Prior chest CT 2017 HISTORY: ORDERING SYSTEM PROVIDED HISTORY: Left lung base nodule, rule out neoplasm TECHNOLOGIST PROVIDED HISTORY: Reason for exam:->Left lung base nodule, rule out neoplasm Reason for Exam: shortness of breath,cough,pneumonia Relevant Medical/Surgical History: smoker x 25 years FINDINGS: Mediastinum: Thyroid gland is unremarkable. No intimal flap seen in aorta. No pericardial calcification noted. Small mediastinal and hilar nodes are noted. A small lymph node in the left paratracheal region, inferiorly in the AP window measures 1.2 cm by 1.6 cm. Lungs/pleura: On the right, tiny right-sided pleural effusion is seen. There is dense and patchy consolidative change seen in the right middle lobe and right lower lobe with associated air bronchograms. Subtle patchy ground-glass opacity is seen posteriorly in the right upper lobe On the left, patchy parenchymal opacity is seen posteriorly in the left upper lobe, with consolidative change also seen in the lingula, and scattered throughout the left lower lobe. A more focal nodular density is seen peripherally in the left lower lobe, measuring 1.2 cm x 1.2 cm. Filling defects are seen in the airways on the left, likely mucous plugging Tiny pleural effusion is seen on the left Upper Abdomen: Adrenal glands incompletely imaged. Postsurgical change seen in the stomach. Soft Tissues/Bones: Spurring is seen in the spine. Spurring is seen in the shoulder joints. .  There is gynecomastia     Multifocal pneumonia,right greater than left with tiny pleural effusions.  A more focal nodular density in the left lower lobe is indeterminate measuring 1.2 cm in size. Recommend short-term follow-up chest CT after treatment approximately 4-6 weeks time to assess for change. Small mediastinal nodes are seen which may simply be reactive. RECOMMENDATIONS: *pulmonary management*     XR CHEST PORTABLE    Result Date: 1/8/2023  EXAMINATION: ONE XRAY VIEW OF THE CHEST 1/7/2023 11:39 pm COMPARISON: 01/07/2023 HISTORY: ORDERING SYSTEM PROVIDED HISTORY: status post right thoracentesis TECHNOLOGIST PROVIDED HISTORY: Reason for exam:->status post right thoracentesis Reason for Exam: status post right thoracentesis FINDINGS: No pneumothorax identified. Redemonstration of infiltrate in the right mid to lower lung field. Streaky opacities in the left suprahilar region and left lung base again noted. Cardiac and mediastinal contours appear unchanged. No pneumothorax identified. Redemonstration of bilateral airspace disease, right greater than left. XR CHEST PORTABLE    Result Date: 1/7/2023  EXAMINATION: ONE XRAY VIEW OF THE CHEST 1/7/2023 8:43 pm COMPARISON: 11/18/2020 HISTORY: ORDERING SYSTEM PROVIDED HISTORY: chest pain TECHNOLOGIST PROVIDED HISTORY: Reason for exam:->chest pain Reason for Exam: CP, SOB, cough FINDINGS: The heart is borderline enlarged unchanged. The pulmonary vessels are engorged centrally. There is moderate airspace opacity along the right perihilar region extending inferiorly. There is some hazy ground-glass opacity scattered along the left upper and lower lung which is more prominent. There is mild blunting of the right costophrenic sulcus which is more apparent. There is a rounded nodule projecting along left lung base laterally measuring 1.6 cm which is apparent     Mild central pulmonary congestion Moderate airspace opacity right lung base and hazy opacities along the left upper lobe and left lung base which could represent multisegmental bronchopneumonia. Recommend short-term follow-up Small questionable small right pleural effusion. 1.6 cm nodule left lung base laterally which is more apparent could represent a small neoplasm.   Recommend CT correlation     Gram Stain Result  Abnormal   Cytospin performed,no quantitation   WBC's (Polymorphonuclear)   Gram positive cocci   [facility]   Organism Streptococcus pneumoniae Abnormal   [address] [facility]   Body Fluid Culture, Sterile Heavy growth  [address] [facility]        Susceptibility    Streptococcus pneumoniae (1)    Antibiotic Interpretation Microscan  Method Status    benzylpenicillin (meningitis) Sensitive <=0.06 mcg/mL BACTERIAL SUSCEPTIBILITY PANEL BY NASIMA     benzylpenicillin (oral) Sensitive <=0.06 mcg/mL BACTERIAL SUSCEPTIBILITY PANEL BY NASIMA     benzylpenicillin (pneumoniae) Sensitive <=0.06 mcg/mL BACTERIAL SUSCEPTIBILITY PANEL BY NASIMA     cefotaxime (meningitis) Sensitive <=0.12 mcg/mL BACTERIAL SUSCEPTIBILITY PANEL BY NASIMA     Cefotaxime (other) Sensitive <=0.12 mcg/mL BACTERIAL SUSCEPTIBILITY PANEL BY NASIMA     cefTRIAXone (meningitis) Sensitive <=0.12 mcg/mL BACTERIAL SUSCEPTIBILITY PANEL BY NASIMA     Ceftriaxone (other) Sensitive <=0.12 mcg/mL BACTERIAL SUSCEPTIBILITY PANEL BY NASIMA     erythromycin Sensitive <=0.12 mcg/mL BACTERIAL SUSCEPTIBILITY PANEL BY NASIMA     trimethoprim-sulfamethoxazole Sensitive <=10 mcg/mL BACTERIAL SUSCEPTIBILITY PANEL BY NASIMA     vancomycin Sensitive                    Latest Reference Range & Units 1/7/23 23:27   Source + CELL CT/DIFF-BF  Pleural   Appearance, Fluid  Turbid   Color, Fluid  Yellow   Fluid Type  Pleural   LD, Fluid Not Established U/L 336   RBC, Fluid /cumm 11,700   Lymphocytes, Body Fluid % 2   Monocyte Count, Fluid % 4   Neutrophil Count, Fluid % 93   Nucl Cell, Fluid /cumm 20,443   Protein, Fluid Not Established g/dL 3.3   pH, Body Fluid Not Established  7.5   Clot Eval.  see below   Number of Cells Counted Fluid  100     TWO XRAY VIEWS OF THE CHEST       1/10/2023 7:38 am       COMPARISON:   January 7, 2023       HISTORY: ORDERING SYSTEM PROVIDED HISTORY: pna, empyema   TECHNOLOGIST PROVIDED HISTORY:   Reason for exam:->pna, empyema       FINDINGS:   Large consolidation right lower lobe of the lung and moderate consolidation   left perihilar region. Left lung base infiltrate. Small right pleural   effusion. There is no pneumothorax. These findings are largely unchanged. Normal cardiac size. Moderate osteopenic changes and degenerative changes noted in the bony   structures. Impression   Bilateral pneumonias without any improvement     Echocardiogram:None available in Epic     PFT:None available in [facility]     Culture, Blood 2  Abnormal   Gram stain Anaerobic bottle:   Gram positive cocci in chains and/or pairs   resembling Streptococcus   Information to follow   P      Organism Streptococcus pneumoniae Abnormal  P    Culture, Blood 2 POSITIVE for   No further workup   Isolated two of two sets   Refer to culture [id number] for sensitivity results           Assessment:  Principal Problem:    Pneumonia of right lower lobe due to infectious organism  Active Problems:    DM2 (diabetes mellitus, type 2) ([facility])    Smoker    ADHD    History of sleeve gastrectomy    Empyema ([facility])    Leukocytosis    Bandemia    Thrombocytosis    Normocytic normochromic anemia    Uncontrolled hypertension    Elevated BUN    Elevated procalcitonin    Pleuritic chest pain    Hemoptysis    Bacteremia due to Streptococcus  Resolved Problems:    * No resolved hospital problems.  *          Plan:   Oxygen supplementation, if required, to keep saturation between 90 and 94% only  Patient was on room air oxygen when seen  Patient continues to be symptomatic and was having audible chest congestion when evaluated  Patient was shown the CT of the chest along with his findings, differential diagnosis implications and options  Patient does have bilateral pulm infiltrates with mucous plugging along with that patient also has loculated pleural effusion which appears to be empyema secondary to streptococcal pneumonia  Patient has been started on Rocephin and Flagyl by ID-further management depending on patient's clinical status and the further culture reports  Patient does have pleuritic chest pain because of involvement of the pleural surface which was discussed in detail  Patient has been having some hemoptysis off-and-on along with that patient has significant chest congestion  Patient needs a bronchoscopy for diagnostic and therapeutic purposes and patient was told about the procedure along with the pros and cons and patient wanted to proceed with that-we will keep the patient n.p.o. after midnight for the procedure tomorrow morning  Patient also may need to be evaluated for a chest tube insertion with thrombolytic therapy in the pleural cavity if patient's clinical status does not improve and the radiology does not improve and if conservative management does not improve patient may require CT surgery input for decortication  Analgesia as per IM  Consider lidocaine patch locally for pleuritic chest pain if deemed appropriate  Will start the patient on bronchodilators and reassess  No need for any systemic steroids for now  Trend the WBC and platelet count  Antihypertensives as per IM  Patient blood pressure was not optimally controlled when seen  Nicotine replacement therapy  PUD and DVT prophylaxis as per IM    Case d/w patient and family     Further management depending on patient clinical status and follow-up on above recommendations along with the bronchoscopy findings        Electronically signed by:  Miquel Palafox MD    1/10/2023    6:16 PM.

## 2023-01-10 NOTE — PLAN OF CARE
Problem: Discharge Planning  Goal: Discharge to home or other facility with appropriate resources  Outcome: Progressing     Problem: Safety - Adult  Goal: Free from fall injury  Outcome: Progressing  Flowsheets (Taken 1/9/2023 2000)  Free From Fall Injury:   Based on caregiver fall risk screen, instruct family/caregiver to ask for assistance with transferring infant if caregiver noted to have fall risk factors   Instruct family/caregiver on patient safety     Problem: Pain  Goal: Verbalizes/displays adequate comfort level or baseline comfort level  Outcome: Progressing     Problem: Nutrition Deficit:  Goal: Optimize nutritional status  Outcome: Progressing     Problem: Chronic Conditions and Co-morbidities  Goal: Patient's chronic conditions and co-morbidity symptoms are monitored and maintained or improved  Outcome: Progressing     Problem: Metabolic/Fluid and Electrolytes - Adult  Goal: Glucose maintained within prescribed range  Outcome: Progressing

## 2023-01-10 NOTE — CONSULTS
Consult placed  RN aware    Who: Dr. Asha Marino, pulmonology  Date:1/10/2023,  Time:1:58 PM        Electronically signed by Sharath Concepcion on 1/10/2023 at 1:58 PM

## 2023-01-10 NOTE — PROGRESS NOTES
Infectious Disease Follow up Notes    CC :  pneumococcal pneumonia, bacteremia, empyema      Antibiotics:   Rocephin 2g q24  Flagyl 500 po TID     Admit Date:   1/7/2023  Hospital Day: 4    Subjective:   He remains AF  On RA   Continued sharp vice like pain omid mid back, relieved when laying L side down  Coughing. +Pleuritic pain.       Objective:     Patient Vitals for the past 8 hrs:   BP Temp Pulse Resp SpO2   01/10/23 1125 (!) 154/96 97.9 °F (36.6 °C) 88 20 92 %   01/10/23 1000 -- -- -- 18 --   01/10/23 0735 (!) 156/100 98.2 °F (36.8 °C) 81 -- 92 %       EXAM:  General:   alert, oriented, NAD    HEENT:   NCAT, PERRL,  sclera anicteric  MMM, no thrush  NECK:   supple     LUNGS:    decreased BS R base with rhonci, rub  No tenderness to palpation of the spine    CV:    RRR    ABD:  soft, flat, NT    EXT:  no focal rash         LINE:   PIV in place         Scheduled Meds:   lisinopril  10 mg Oral Daily    cefTRIAXone (ROCEPHIN) IV  2,000 mg IntraVENous Q24H    lactobacillus  1 capsule Oral BID WC    metroNIDAZOLE  500 mg Oral 3 times per day    aspirin  81 mg Oral Daily    enoxaparin  40 mg SubCUTAneous Daily    nicotine  1 patch TransDERmal Daily    insulin lispro  0-4 Units SubCUTAneous TID WC    insulin lispro  0-4 Units SubCUTAneous Nightly    amphetamine-dextroamphetamine  30 mg Oral BID AC    gabapentin  300 mg Oral TID       Continuous Infusions:   dextrose      sodium chloride Stopped (01/08/23 1209)          Data Review:    Lab Results   Component Value Date    WBC 19.1 (H) 01/10/2023    HGB 10.4 (L) 01/10/2023    HCT 32.1 (L) 01/10/2023    MCV 90.6 01/10/2023     (H) 01/10/2023     Lab Results   Component Value Date    CREATININE <0.5 (L) 01/10/2023    BUN 36 (H) 01/10/2023     01/10/2023    K 4.3 01/10/2023     01/10/2023    CO2 27 01/10/2023       Hepatic Function Panel:   Lab Results   Component Value Date/Time    ALKPHOS 116 07/23/2021 05:15 PM    ALT 23 07/23/2021 05:15 PM    AST 18 07/23/2021 05:15 PM    PROT 7.2 07/23/2021 05:15 PM    BILITOT 0.4 07/23/2021 05:15 PM    LABALBU 4.2 07/23/2021 05:15 PM         MICRO:  1/7 BC x2 S pneumoniae, NASIMA pending    Pleural fluid culture S pneumoniae   1/8 Legionella and pneumococcal ag neg   Sputum culture NRF   MRSA nasal screen neg       IMAGING:  CXR 1/10/23  Large consolidation right lower lobe of the lung and moderate consolidation   left perihilar region. Left lung base infiltrate. Small right pleural   effusion. There is no pneumothorax. These findings are largely unchanged. Normal cardiac size. CT chest 1/8/23  Impression   Multifocal pneumonia,right greater than left with tiny pleural effusions. A more focal nodular density in the left lower lobe is indeterminate   measuring 1.2 cm in size. Recommend short-term follow-up chest CT after   treatment approximately 4-6 weeks time to assess for change. Small mediastinal nodes are seen which may simply be reactive.        Assessment:     Patient Active Problem List    Diagnosis Date Noted    Pneumonia of right lower lobe due to Streptococcus pneumoniae ([facility] [address] 01/09/2023     Priority: Medium    Empyema ([address] 01/09/2023     Priority: Medium    Leukocytosis 01/09/2023     Priority: Medium    DM2 (diabetes mellitus, type 2) ([facility] [address] 01/07/2023     Priority: Medium    Smoker 01/07/2023     Priority: Medium    ADHD 01/07/2023     Priority: Medium    History of sleeve gastrectomy 01/07/2023     Priority: Medium    Pneumonia of right lower lobe due to infectious organism 01/07/2023     Priority: Medium    Hypoxia 12/13/2017    Bronchitis 12/13/2017    CAP (community acquired pneumonia) due to Chlamydia species 12/13/2017       History of smoking, HTN     Admitted 1/7/23 with severe multifocal pneumococcal pneumonia with bacteremia and empyema   Multifocal infiltrates  S/p thoracentesis in ED with turbid fluid and growth of alpha-hemolytic strep in culture (presume this will also be S pneumoniae)     Nodular density LLL of unclear significane for which R  adiology has suggested short term follow-up CT      WBC remains elevated   No fever  No supplemental oxygen requirement   May be effect of solumedrol that was stopped; last dose 1/8 PM     History of C diff infection 3/2020 (+PCR, Rx vanc, no recurrence)     NKDA     Plan:     WBC is down only slightly today   CXR shows small R effusion     I think he will probably req additional pleural drainage.   Will ask Pulm for opinion on need of further pleural drainage, CT etc     Continue rocephin, flagyl       Discussed with patient/family, all questions answered  D/w RN Oma Sacks, MD  Phone: [phone]   Fax : [phone]

## 2023-01-10 NOTE — PROGRESS NOTES
Hospitalist Progress Note      PCP: Yumiko Mattson MD    Date of Admission: 1/7/2023    Chief Complaint: Fatigue    Hospital Course:   Sahara Pablo is a 65 y.o. male. He presented to the eR from home. He c/o feeling ill for about a week. He describes feeling constitutionally ill w/ fatigue, malaise, loss of appetite. Along the way he has had a worsening cough, worsening dyspnea, and pretty marked right-sided pleuritic chest pain along the lateral and anterolater right costal margin. CXR with mild central pulmonary congestion. Small right pleural effusion. Thoracentesis with 300 ml fluid removed. Subjective: Pain in back on right side. Productive cough.        Medications:  Reviewed    Infusion Medications    dextrose      sodium chloride Stopped (01/08/23 1209)     Scheduled Medications    cefTRIAXone (ROCEPHIN) IV  2,000 mg IntraVENous Q24H    lactobacillus  1 capsule Oral BID WC    metroNIDAZOLE  500 mg Oral 3 times per day    aspirin  81 mg Oral Daily    enoxaparin  40 mg SubCUTAneous Daily    nicotine  1 patch TransDERmal Daily    insulin lispro  0-4 Units SubCUTAneous TID WC    insulin lispro  0-4 Units SubCUTAneous Nightly    amphetamine-dextroamphetamine  30 mg Oral BID AC    gabapentin  300 mg Oral TID     PRN Meds: glucose, dextrose bolus **OR** dextrose bolus, glucagon (rDNA), dextrose, sodium chloride flush, sodium chloride, potassium chloride **OR** potassium alternative oral replacement **OR** potassium chloride, magnesium sulfate, ondansetron **OR** ondansetron, acetaminophen **OR** acetaminophen, melatonin, calcium carbonate, HYDROcodone 5 mg - acetaminophen, ipratropium-albuterol, HYDROmorphone, albuterol, benzonatate      Intake/Output Summary (Last 24 hours) at 1/10/2023 0809  Last data filed at 1/10/2023 0400  Gross per 24 hour   Intake 720 ml   Output 900 ml   Net -180 ml         Physical Exam Performed:    BP (!) 156/100   Pulse 81   Temp 98.2 °F (36.8 °C)   Resp 18 Ht 5' 8\" (1.727 m)   Wt 165 lb 1.6 oz (74.9 kg)   SpO2 92%   BMI 25.10 kg/m²     General appearance: No apparent distress, appears stated age and cooperative. HEENT: Pupils equal, round, and reactive to light. Conjunctivae/corneas clear. Neck: Supple, with full range of motion. No jugular venous distention. Trachea midline. Respiratory:  Normal respiratory effort. Crackles in right lung base  Cardiovascular: Regular rate and rhythm with normal S1/S2 without murmurs, rubs or gallops. Abdomen: Soft, non-tender, non-distended with normal bowel sounds. Musculoskeletal: No clubbing, cyanosis or edema bilaterally. Full range of motion without deformity. Skin: Skin color, texture, turgor normal.  No rashes or lesions. Neurologic:  Neurovascularly intact without any focal sensory/motor deficits. Cranial nerves: II-XII intact, grossly non-focal.  Psychiatric: Alert and oriented, thought content appropriate, normal insight  Capillary Refill: Brisk, 3 seconds, normal   Peripheral Pulses: +2 palpable, equal bilaterally       Labs:   Recent Labs     01/08/23  0558 01/09/23  0659 01/10/23  0736   WBC 22.1* 23.3* 19.1*   HGB 12.1* 11.1* 10.4*   HCT 37.1* 33.3* 32.1*   * 452* 459*       Recent Labs     01/07/23 2048 01/08/23  0558 01/09/23  0659   * 136 139   K 4.9 4.0 4.5   CL 98* 98* 100   CO2 25 26 28   BUN 22* 24* 34*   CREATININE <0.5* <0.5* <0.5*   CALCIUM 8.3 9.0 8.1*       No results for input(s): AST, ALT, BILIDIR, BILITOT, ALKPHOS in the last 72 hours.   Recent Labs     01/07/23  2240   INR 1.32*       Recent Labs     01/07/23 2048   TROPONINI <0.01         Urinalysis:      Lab Results   Component Value Date/Time    NITRU Negative 07/23/2021 04:28 PM    WBCUA 0-2 07/23/2021 04:28 PM    RBCUA 3-4 07/23/2021 04:28 PM    BLOODU Negative 07/23/2021 04:28 PM    SPECGRAV >=1.030 07/23/2021 04:28 PM    GLUCOSEU Negative 07/23/2021 04:28 PM       Radiology:  XR CHEST (2 VW)   Final Result   Bilateral pneumonias without any improvement. Follow-up advised. CT CHEST W CONTRAST   Final Result   Multifocal pneumonia,right greater than left with tiny pleural effusions. A more focal nodular density in the left lower lobe is indeterminate   measuring 1.2 cm in size. Recommend short-term follow-up chest CT after   treatment approximately 4-6 weeks time to assess for change. Small mediastinal nodes are seen which may simply be reactive. RECOMMENDATIONS:   *pulmonary management*         XR CHEST PORTABLE   Final Result   No pneumothorax identified. Redemonstration of bilateral airspace disease,   right greater than left. XR CHEST PORTABLE   Final Result   Mild central pulmonary congestion      Moderate airspace opacity right lung base and hazy opacities along the left   upper lobe and left lung base which could represent multisegmental   bronchopneumonia. Recommend short-term follow-up      Small questionable small right pleural effusion. 1.6 cm nodule left lung base laterally which is more apparent could represent   a small neoplasm. Recommend CT correlation             IP CONSULT TO HOSPITALIST  IP CONSULT TO INFECTIOUS DISEASES    Assessment/Plan:    Active Hospital Problems    Diagnosis     Pneumonia of right lower lobe due to Streptococcus pneumoniae ([address] [J13]      Priority: Medium    Empyema ([address] [J86.9]      Priority: Medium    Leukocytosis [D72.829]      Priority: Medium    DM2 (diabetes mellitus, type 2) ([address] [E11.9]      Priority: Medium    Smoker [F17.200]      Priority: Medium    ADHD [F90.9]      Priority: Medium    History of sleeve gastrectomy [Z90.3]      Priority: Medium    Pneumonia of right lower lobe due to infectious organism [J18.9]      Priority: Medium     Community-acquired pneumonia: Continue IV Rocephin DC azithromycin. Pneumonia complicated by parapneumonic effusion. Continue supplemental oxygen.   As needed pain medication for related pain    Strep pneumoniae bacteremia - Ceftriaxone. ID consulted     Sepsis - w/ Leukocytosis/Tachycardia POArrival 2nd to above infection. Continue IVF as appropriate and monitor clinical response w/ ABX as written. Right pleural effusion: Status postthoracentesis yesterday in the ER. Gram stain showed Gram positive cocci. Pleural fluid analysis pending     Acute hypoxic respiratory failure: Secondary to severe pneumonia. Currently requiring 3.5 L of NC O2, was not on home O2 prior to this. Continue supplemental oxygen and wean off as able. Pulmonary nodule: 1.6 cm nodule left lung base, concerning for small neoplasm on chest x-ray. We will get CT chest to further evaluate. HTN - w/out known CAD and no evidence of active signs/sxs of ischemia/failure. Currently controlled on home meds w/ vitals reviewed and documented as above. DM type II: Continue subcu insulin regimen including long-acting and short acting insulin as ordered, continue SSI protocol for more adequate glycemic control. ADHD: Continue outpatient Adderall    Chronic back pain - continue gabapentin    DVT Prophylaxis: Lovenox  Diet: ADULT DIET; Regular  Code Status: Full Code  PT/OT Eval Status: Not indicated    Dispo - Pending clinical improvement.  Expect > 2 days     Appropriate for A1 Discharge Unit: No      Feleciaa Schuyler, DO

## 2023-01-10 NOTE — PLAN OF CARE
Problem: Pain  Goal: Verbalizes/displays adequate comfort level or baseline comfort level  Outcome: Progressing   Patient able to rate pain on a 0-10 level. PRN medication available per MAR. Instructed on non pharmaceutical measures available.

## 2023-01-11 ENCOUNTER — APPOINTMENT (OUTPATIENT)
Dept: CT IMAGING | Age: 63
DRG: 871 | End: 2023-01-11
Payer: MEDICARE

## 2023-01-11 LAB
ANION GAP SERPL CALCULATED.3IONS-SCNC: 6 MMOL/L (ref 3–16)
BANDED NEUTROPHILS RELATIVE PERCENT: 7 % (ref 0–7)
BASOPHILS ABSOLUTE: 0 K/UL (ref 0–0.2)
BASOPHILS RELATIVE PERCENT: 0 %
BLOOD CULTURE, ROUTINE: ABNORMAL
BLOOD CULTURE, ROUTINE: ABNORMAL
BUN BLDV-MCNC: 20 MG/DL (ref 7–20)
CALCIUM SERPL-MCNC: 7.6 MG/DL (ref 8.3–10.6)
CHLORIDE BLD-SCNC: 100 MMOL/L (ref 99–110)
CO2: 27 MMOL/L (ref 21–32)
CREAT SERPL-MCNC: <0.5 MG/DL (ref 0.8–1.3)
CULTURE, BLOOD 2: ABNORMAL
EOSINOPHILS ABSOLUTE: 0 K/UL (ref 0–0.6)
EOSINOPHILS RELATIVE PERCENT: 0 %
GFR SERPL CREATININE-BSD FRML MDRD: >60 ML/MIN/{1.73_M2}
GLUCOSE BLD-MCNC: 118 MG/DL (ref 70–99)
GLUCOSE BLD-MCNC: 120 MG/DL (ref 70–99)
GLUCOSE BLD-MCNC: 121 MG/DL (ref 70–99)
GLUCOSE BLD-MCNC: 171 MG/DL (ref 70–99)
GLUCOSE BLD-MCNC: 89 MG/DL (ref 70–99)
HCT VFR BLD CALC: 33.9 % (ref 40.5–52.5)
HEMOGLOBIN: 11.3 G/DL (ref 13.5–17.5)
IGA: 405 MG/DL (ref 70–400)
IGG: 1372 MG/DL (ref 700–1600)
IGM: 141 MG/DL (ref 40–230)
LYMPHOCYTES ABSOLUTE: 0.8 K/UL (ref 1–5.1)
LYMPHOCYTES RELATIVE PERCENT: 5 %
MCH RBC QN AUTO: 30.1 PG (ref 26–34)
MCHC RBC AUTO-ENTMCNC: 33.2 G/DL (ref 31–36)
MCV RBC AUTO: 90.6 FL (ref 80–100)
MONOCYTES ABSOLUTE: 0.8 K/UL (ref 0–1.3)
MONOCYTES RELATIVE PERCENT: 5 %
NEUTROPHILS ABSOLUTE: 15.1 K/UL (ref 1.7–7.7)
NEUTROPHILS RELATIVE PERCENT: 83 %
ORGANISM: ABNORMAL
PDW BLD-RTO: 14.6 % (ref 12.4–15.4)
PERFORMED ON: ABNORMAL
PLATELET # BLD: 511 K/UL (ref 135–450)
PLATELET SLIDE REVIEW: ABNORMAL
PMV BLD AUTO: 7.7 FL (ref 5–10.5)
POTASSIUM SERPL-SCNC: 4.2 MMOL/L (ref 3.5–5.1)
RBC # BLD: 3.75 M/UL (ref 4.2–5.9)
RBC # BLD: NORMAL 10*6/UL
RHEUMATOID FACTOR: 12 IU/ML
SLIDE REVIEW: ABNORMAL
SODIUM BLD-SCNC: 133 MMOL/L (ref 136–145)
WBC # BLD: 16.8 K/UL (ref 4–11)

## 2023-01-11 PROCEDURE — 6370000000 HC RX 637 (ALT 250 FOR IP): Performed by: INTERNAL MEDICINE

## 2023-01-11 PROCEDURE — 82784 ASSAY IGA/IGD/IGG/IGM EACH: CPT

## 2023-01-11 PROCEDURE — 2580000003 HC RX 258: Performed by: INTERNAL MEDICINE

## 2023-01-11 PROCEDURE — 6360000002 HC RX W HCPCS: Performed by: INTERNAL MEDICINE

## 2023-01-11 PROCEDURE — 1200000000 HC SEMI PRIVATE

## 2023-01-11 PROCEDURE — 99232 SBSQ HOSP IP/OBS MODERATE 35: CPT | Performed by: INTERNAL MEDICINE

## 2023-01-11 PROCEDURE — 6370000000 HC RX 637 (ALT 250 FOR IP): Performed by: NURSE PRACTITIONER

## 2023-01-11 PROCEDURE — 80048 BASIC METABOLIC PNL TOTAL CA: CPT

## 2023-01-11 PROCEDURE — 71250 CT THORAX DX C-: CPT

## 2023-01-11 PROCEDURE — 86038 ANTINUCLEAR ANTIBODIES: CPT

## 2023-01-11 PROCEDURE — 36415 COLL VENOUS BLD VENIPUNCTURE: CPT

## 2023-01-11 PROCEDURE — 83516 IMMUNOASSAY NONANTIBODY: CPT

## 2023-01-11 PROCEDURE — 86606 ASPERGILLUS ANTIBODY: CPT

## 2023-01-11 PROCEDURE — 86003 ALLG SPEC IGE CRUDE XTRC EA: CPT

## 2023-01-11 PROCEDURE — 82103 ALPHA-1-ANTITRYPSIN TOTAL: CPT

## 2023-01-11 PROCEDURE — 82785 ASSAY OF IGE: CPT

## 2023-01-11 PROCEDURE — 86005 ALLG SPEC IGE MULTIALLG SCR: CPT

## 2023-01-11 PROCEDURE — 82104 ALPHA-1-ANTITRYPSIN PHENO: CPT

## 2023-01-11 PROCEDURE — 86431 RHEUMATOID FACTOR QUANT: CPT

## 2023-01-11 PROCEDURE — 85025 COMPLETE CBC W/AUTO DIFF WBC: CPT

## 2023-01-11 PROCEDURE — 86235 NUCLEAR ANTIGEN ANTIBODY: CPT

## 2023-01-11 PROCEDURE — 86331 IMMUNODIFFUSION OUCHTERLONY: CPT

## 2023-01-11 RX ORDER — IPRATROPIUM BROMIDE AND ALBUTEROL SULFATE 2.5; .5 MG/3ML; MG/3ML
1 SOLUTION RESPIRATORY (INHALATION) EVERY 4 HOURS PRN
Status: DISCONTINUED | OUTPATIENT
Start: 2023-01-11 | End: 2023-01-14

## 2023-01-11 RX ADMIN — Medication 1 CAPSULE: at 08:34

## 2023-01-11 RX ADMIN — Medication 10 ML: at 20:38

## 2023-01-11 RX ADMIN — METRONIDAZOLE 500 MG: 250 TABLET ORAL at 20:37

## 2023-01-11 RX ADMIN — CEFTRIAXONE 2000 MG: 2 INJECTION, POWDER, FOR SOLUTION INTRAMUSCULAR; INTRAVENOUS at 11:39

## 2023-01-11 RX ADMIN — GABAPENTIN 300 MG: 300 CAPSULE ORAL at 20:37

## 2023-01-11 RX ADMIN — GABAPENTIN 300 MG: 300 CAPSULE ORAL at 08:34

## 2023-01-11 RX ADMIN — LISINOPRIL 10 MG: 10 TABLET ORAL at 08:34

## 2023-01-11 RX ADMIN — GABAPENTIN 300 MG: 300 CAPSULE ORAL at 15:31

## 2023-01-11 RX ADMIN — Medication 10 ML: at 08:34

## 2023-01-11 RX ADMIN — HYDROMORPHONE HYDROCHLORIDE 1 MG: 1 INJECTION, SOLUTION INTRAMUSCULAR; INTRAVENOUS; SUBCUTANEOUS at 04:11

## 2023-01-11 RX ADMIN — ASPIRIN 81 MG: 81 TABLET, COATED ORAL at 08:34

## 2023-01-11 RX ADMIN — HYDROMORPHONE HYDROCHLORIDE 1 MG: 1 INJECTION, SOLUTION INTRAMUSCULAR; INTRAVENOUS; SUBCUTANEOUS at 22:55

## 2023-01-11 RX ADMIN — DEXTROAMPHETAMINE SACCHARATE, AMPHETAMINE ASPARTATE, DEXTROAMPHETAMINE SULFATE, AMPHETAMINE SULFATE TABLETS, 10 MG,CLL 30 MG: 2.5; 2.5; 2.5; 2.5 TABLET ORAL at 15:31

## 2023-01-11 RX ADMIN — Medication 1 CAPSULE: at 16:08

## 2023-01-11 RX ADMIN — HYDROMORPHONE HYDROCHLORIDE 1 MG: 1 INJECTION, SOLUTION INTRAMUSCULAR; INTRAVENOUS; SUBCUTANEOUS at 16:07

## 2023-01-11 RX ADMIN — METRONIDAZOLE 500 MG: 250 TABLET ORAL at 11:37

## 2023-01-11 ASSESSMENT — PAIN SCALES - GENERAL
PAINLEVEL_OUTOF10: 6
PAINLEVEL_OUTOF10: 8
PAINLEVEL_OUTOF10: 8
PAINLEVEL_OUTOF10: 0
PAINLEVEL_OUTOF10: 8
PAINLEVEL_OUTOF10: 0

## 2023-01-11 ASSESSMENT — PAIN - FUNCTIONAL ASSESSMENT: PAIN_FUNCTIONAL_ASSESSMENT: PREVENTS OR INTERFERES SOME ACTIVE ACTIVITIES AND ADLS

## 2023-01-11 ASSESSMENT — PAIN DESCRIPTION - ORIENTATION
ORIENTATION: RIGHT
ORIENTATION: RIGHT

## 2023-01-11 ASSESSMENT — PAIN SCALES - WONG BAKER: WONGBAKER_NUMERICALRESPONSE: 0

## 2023-01-11 ASSESSMENT — PAIN DESCRIPTION - LOCATION
LOCATION: CHEST
LOCATION: BACK

## 2023-01-11 ASSESSMENT — PAIN DESCRIPTION - DESCRIPTORS: DESCRIPTORS: DISCOMFORT;PRESSURE;SHARP

## 2023-01-11 NOTE — PROGRESS NOTES
Hospitalist Progress Note      PCP: Teodoro Jaime MD    Date of Admission: 1/7/2023    Chief Complaint: Fatigue    Hospital Course:   Janette Ennis is a 65 y.o. male. He presented to the eR from home. He c/o feeling ill for about a week. He describes feeling constitutionally ill w/ fatigue, malaise, loss of appetite. Along the way he has had a worsening cough, worsening dyspnea, and pretty marked right-sided pleuritic chest pain along the lateral and anterolater right costal margin. CXR with mild central pulmonary congestion. Small right pleural effusion. Thoracentesis with 300 ml fluid removed. Subjective: Pain in back on right side. Pleuritic. Afebrile.  In good spirits       Medications:  Reviewed    Infusion Medications    dextrose      sodium chloride Stopped (01/08/23 1209)     Scheduled Medications    lisinopril  10 mg Oral Daily    cefTRIAXone (ROCEPHIN) IV  2,000 mg IntraVENous Q24H    lactobacillus  1 capsule Oral BID WC    metroNIDAZOLE  500 mg Oral 3 times per day    aspirin  81 mg Oral Daily    enoxaparin  40 mg SubCUTAneous Daily    nicotine  1 patch TransDERmal Daily    insulin lispro  0-4 Units SubCUTAneous TID WC    insulin lispro  0-4 Units SubCUTAneous Nightly    amphetamine-dextroamphetamine  30 mg Oral BID AC    gabapentin  300 mg Oral TID     PRN Meds: ipratropium-albuterol, glucose, dextrose bolus **OR** dextrose bolus, glucagon (rDNA), dextrose, sodium chloride flush, sodium chloride, potassium chloride **OR** potassium alternative oral replacement **OR** potassium chloride, magnesium sulfate, ondansetron **OR** ondansetron, acetaminophen **OR** acetaminophen, melatonin, calcium carbonate, HYDROcodone 5 mg - acetaminophen, ipratropium-albuterol, HYDROmorphone, albuterol, benzonatate      Intake/Output Summary (Last 24 hours) at 1/11/2023 0526  Last data filed at 1/11/2023 0410  Gross per 24 hour   Intake 240 ml   Output 1350 ml   Net -1110 ml         Physical Exam Performed:    BP (!) 163/94   Pulse 93   Temp 99 °F (37.2 °C) (Oral)   Resp 16   Ht 5' 8\" (1.727 m)   Wt 161 lb 4.8 oz (73.2 kg)   SpO2 92%   BMI 24.53 kg/m²     General appearance: No apparent distress, appears stated age and cooperative. HEENT: Pupils equal, round, and reactive to light. Conjunctivae/corneas clear. Neck: Supple, with full range of motion. No jugular venous distention. Trachea midline. Respiratory:  Normal respiratory effort. Crackles in right lung base  Cardiovascular: Regular rate and rhythm with normal S1/S2 without murmurs, rubs or gallops. Abdomen: Soft, non-tender, non-distended with normal bowel sounds. Musculoskeletal: No clubbing, cyanosis or edema bilaterally. Full range of motion without deformity. Skin: Skin color, texture, turgor normal.  No rashes or lesions. Neurologic:  Neurovascularly intact without any focal sensory/motor deficits. Cranial nerves: II-XII intact, grossly non-focal.  Psychiatric: Alert and oriented, thought content appropriate, normal insight  Capillary Refill: Brisk, 3 seconds, normal   Peripheral Pulses: +2 palpable, equal bilaterally       Labs:   Recent Labs     01/08/23  0558 01/09/23  0659 01/10/23  0736   WBC 22.1* 23.3* 19.1*   HGB 12.1* 11.1* 10.4*   HCT 37.1* 33.3* 32.1*   * 452* 459*       Recent Labs     01/08/23  0558 01/09/23  0659 01/10/23  0736    139 139   K 4.0 4.5 4.3   CL 98* 100 104   CO2 26 28 27   BUN 24* 34* 36*   CREATININE <0.5* <0.5* <0.5*   CALCIUM 9.0 8.1* 8.0*       No results for input(s): AST, ALT, BILIDIR, BILITOT, ALKPHOS in the last 72 hours. No results for input(s): INR in the last 72 hours. No results for input(s): Duy Estill in the last 72 hours.       Urinalysis:      Lab Results   Component Value Date/Time    NITRU Negative 07/23/2021 04:28 PM    [address] 0-2 07/23/2021 04:28 PM    RBCUA 3-4 07/23/2021 04:28 PM    BLOODU Negative 07/23/2021 04:28 PM    SPECGRAV >=1.030 07/23/2021 04:28 PM GLUCOSEU Negative 07/23/2021 04:28 PM       Radiology:  XR CHEST (2 VW)   Final Result   Bilateral pneumonias without any improvement. Follow-up advised. CT CHEST W CONTRAST   Final Result   Multifocal pneumonia,right greater than left with tiny pleural effusions. A more focal nodular density in the left lower lobe is indeterminate   measuring 1.2 cm in size. Recommend short-term follow-up chest CT after   treatment approximately 4-6 weeks time to assess for change. Small mediastinal nodes are seen which may simply be reactive. RECOMMENDATIONS:   *pulmonary management*         XR CHEST PORTABLE   Final Result   No pneumothorax identified. Redemonstration of bilateral airspace disease,   right greater than left. XR CHEST PORTABLE   Final Result   Mild central pulmonary congestion      Moderate airspace opacity right lung base and hazy opacities along the left   upper lobe and left lung base which could represent multisegmental   bronchopneumonia. Recommend short-term follow-up      Small questionable small right pleural effusion. 1.6 cm nodule left lung base laterally which is more apparent could represent   a small neoplasm.   Recommend CT correlation             IP CONSULT TO HOSPITALIST  IP CONSULT TO INFECTIOUS DISEASES  IP CONSULT TO PULMONOLOGY    Assessment/Plan:    Active Hospital Problems    Diagnosis     Bandemia [D72.825]      Priority: Medium    Thrombocytosis [D75.839]      Priority: Medium    Normocytic normochromic anemia [D64.9]      Priority: Medium    Uncontrolled hypertension [I10]      Priority: Medium    Elevated BUN [R79.9]      Priority: Medium    Elevated procalcitonin [R79.89]      Priority: Medium    Pleuritic chest pain [R07.81]      Priority: Medium    Hemoptysis [R04.2]      Priority: Medium    Bacteremia due to Streptococcus [R78.81, B95.5]      Priority: Medium    Empyema ([address] [J86.9]      Priority: Medium    Leukocytosis [D72.829]      Priority: Medium    DM2 (diabetes mellitus, type 2) [address] [E11.9]      Priority: Medium    Smoker [F17.200]      Priority: Medium    ADHD [F90.9]      Priority: Medium    History of sleeve gastrectomy [Z90.3]      Priority: Medium    Pneumonia of right lower lobe due to infectious organism [J18.9]      Priority: Medium     Community-acquired pneumonia, pneumococcal, Pneumonia complicated by parapneumonic effusion. Continue IV Rocephin DC azithromycin. Flagyl added since empyema is sometimes polymicrobial.  Continue supplemental oxygen. As needed pain medication for related pain. Pulm consulted and repeating CT    Strep pneumoniae bacteremia - Ceftriaxone. ID consulted. Likely 2-3 weeks total of abx. Repeat Blood cultures pending     Sepsis - w/ Leukocytosis/Tachycardia POArrival 2nd to above infection. Continue IVF as appropriate and monitor clinical response w/ ABX as written. Acute hypoxic respiratory failure: Secondary to severe pneumonia. Continue supplemental oxygen and wean off as able. Pulmonary nodule: 1.6 cm nodule left lung base, concerning for small neoplasm on chest x-ray. We will get CT chest to further evaluate. HTN - w/out known CAD and no evidence of active signs/sxs of ischemia/failure. Currently controlled on home meds w/ vitals reviewed and documented as above. DM type II: Continue subcu insulin regimen including long-acting and short acting insulin as ordered, continue SSI protocol for more adequate glycemic control. ADHD: Continue outpatient Adderall    Chronic back pain - continue gabapentin    DVT Prophylaxis: Lovenox  Diet: Diet NPO  Code Status: Full Code  PT/OT Eval Status: Not indicated    Dispo - Pending clinical improvement.  Expect > 2 days     Appropriate for A1 Discharge Unit: Keren Rojas DO

## 2023-01-11 NOTE — PROGRESS NOTES
RTD back to give HHN to pt. Pt did not want to wake up for [facility] at this time. HHN tx held until the morning.

## 2023-01-11 NOTE — PLAN OF CARE
Problem: Pain  Goal: Verbalizes/displays adequate comfort level or baseline comfort level  Outcome: Progressing   Patient able to rate pain on a 0-10 scale. PRN medications available.

## 2023-01-11 NOTE — PROGRESS NOTES
Patient: Shea Munoz Covert MRN: [id number]  Date of  Admission: 1/7/2023   YOB: 1960  Age: 65 y.o. Sex: male    Unit: [place] A2 CARD TELEMETRY  Room/Bed: 0202/0202-02 Admitting Physician: China Hoskins    Attending Physician:  Khari Rodriguez DO         Pulmonary Service Note  This is a 52-year-old gentleman who came to the emergency department because of shortness of breath and cough found to have a right-sided pleural effusion. Had a thoracentesis done. Which showed a exudative pleural effusion. Patient had infectious disease consulted. And infectious disease consulted pulmonary in regards to the pleural effusion seen on the CT scan.       SUBJECTIVE:    No issues overnight  Seen by pulmonologist yesterday and bronchoscopy was considered however the patient was unsure if he wanted to do a bronchoscopy  Patient's not having issues with congestion but rather some chest discomfort on the right side  No nausea vomiting  No fevers or chills  No hemoptysis    ROS:  A comprehensive review of systems was negative except for: above    OBJECTIVE    Medications    Continuous Infusions:   dextrose      sodium chloride Stopped (01/08/23 1209)       Scheduled Meds:   lisinopril  10 mg Oral Daily    cefTRIAXone (ROCEPHIN) IV  2,000 mg IntraVENous Q24H    lactobacillus  1 capsule Oral BID WC    metroNIDAZOLE  500 mg Oral 3 times per day    aspirin  81 mg Oral Daily    enoxaparin  40 mg SubCUTAneous Daily    nicotine  1 patch TransDERmal Daily    insulin lispro  0-4 Units SubCUTAneous TID WC    insulin lispro  0-4 Units SubCUTAneous Nightly    amphetamine-dextroamphetamine  30 mg Oral BID AC    gabapentin  300 mg Oral TID       PRN Meds:  ipratropium-albuterol, glucose, dextrose bolus **OR** dextrose bolus, glucagon (rDNA), dextrose, sodium chloride flush, sodium chloride, potassium chloride **OR** potassium alternative oral replacement **OR** potassium chloride, magnesium sulfate, ondansetron **OR** ondansetron, acetaminophen **OR** acetaminophen, melatonin, calcium carbonate, HYDROcodone 5 mg - acetaminophen, ipratropium-albuterol, HYDROmorphone, albuterol, benzonatate    Physical    Patient Vitals for the past 24 hrs:   BP Temp Temp src Pulse Resp SpO2 Weight   01/11/23 0759 (!) 157/100 98.4 °F (36.9 °C) Oral 89 20 90 % --   01/11/23 0441 -- -- -- -- 16 -- --   01/11/23 0407 (!) 163/94 99 °F (37.2 °C) Oral 93 16 92 % 161 lb 4.8 oz (73.2 kg)   01/10/23 2328 (!) 155/97 98.4 °F (36.9 °C) Oral 92 -- 92 % --   01/10/23 2058 -- -- -- -- 18 -- --   01/10/23 2050 (!) 166/102 99 °F (37.2 °C) Oral 92 18 92 % --   01/10/23 1600 (!) 167/101 99.1 °F (37.3 °C) Oral 88 22 94 % --   01/10/23 1520 -- -- -- -- 20 -- --   01/10/23 1450 -- -- -- -- 20 -- --   01/10/23 1125 (!) 154/96 97.9 °F (36.6 °C) -- 88 20 92 % --   01/10/23 1000 -- -- -- -- 18 -- --        Physical Exam  Vitals and nursing note reviewed. Constitutional:       General: He is not in acute distress. Appearance: Normal appearance. HENT:      Head: Normocephalic and atraumatic. Right Ear: External ear normal.      Left Ear: External ear normal.      Nose: Nose normal.      Mouth/Throat:      Mouth: Mucous membranes are moist.      Pharynx: Oropharynx is clear. Eyes:      General:         Right eye: No discharge. Left eye: No discharge. Extraocular Movements: Extraocular movements intact. Conjunctiva/sclera: Conjunctivae normal.      Pupils: Pupils are equal, round, and reactive to light. Cardiovascular:      Rate and Rhythm: Normal rate and regular rhythm. Pulses: Normal pulses. Heart sounds: No murmur heard. Pulmonary:      Effort: No respiratory distress. Breath sounds: Rhonchi and rales present. Comments: Rhonchi and rales at the right lung base  Abdominal:      General: Bowel sounds are normal. There is no distension. Palpations: Abdomen is soft. There is no mass. Tenderness:  There is no abdominal tenderness. Hernia: No hernia is present. Musculoskeletal:         General: No swelling. Normal range of motion. Cervical back: Normal range of motion. No rigidity. Skin:     General: Skin is warm and dry. Coloration: Skin is not jaundiced or pale. Neurological:      General: No focal deficit present. Mental Status: He is alert and oriented to person, place, and time. Mental status is at baseline. Cranial Nerves: No cranial nerve deficit. Sensory: No sensory deficit. Psychiatric:         Mood and Affect: Mood normal.         Behavior: Behavior normal.         Thought Content: Thought content normal.            Weight  Weight change: -3 lb 12.8 oz (-1.724 kg)      Labs:   Recent Labs     01/09/23  0659 01/10/23  0736 01/11/23  0644   WBC 23.3* 19.1* 16.8*   HGB 11.1* 10.4* 11.3*   HCT 33.3* 32.1* 33.9*   * 459* 511*      Recent Labs     01/09/23 0659 01/10/23  0736 01/11/23  0644    139 133*   K 4.5 4.3 4.2    104 100   CO2 28 27 27   BUN 34* 36* 20   GLUCOSE 170* 88 89       Additional labs      Radiology, images personally reviewed. XR CHEST (2 VW) [[id number]] Collected: 01/10/23 0747      Order Status: Completed Specimen: Chest Updated: 01/10/23 0751     Narrative:       EXAMINATION:   TWO XRAY VIEWS OF THE CHEST     1/10/2023 7:38 am     COMPARISON:   January 7, 2023     HISTORY:   ORDERING SYSTEM PROVIDED HISTORY: pna, empyema   TECHNOLOGIST PROVIDED HISTORY:   Reason for exam:->pna, empyema     FINDINGS:   Large consolidation right lower lobe of the lung and moderate consolidation   left perihilar region. Left lung base infiltrate. Small right pleural   effusion. There is no pneumothorax. These findings are largely unchanged. Normal cardiac size. Moderate osteopenic changes and degenerative changes noted in the bony   structures. Impression:       Bilateral pneumonias without any improvement. Follow-up advised. Assessment:     Principal Problem:    Pneumonia of right lower lobe due to infectious organism  Active Problems:    DM2 (diabetes mellitus, type 2) (HCC)    Smoker    ADHD    History of sleeve gastrectomy    Empyema (HCC)    Leukocytosis    Bandemia    Thrombocytosis    Normocytic normochromic anemia    Uncontrolled hypertension    Elevated BUN    Elevated procalcitonin    Pleuritic chest pain    Hemoptysis    Bacteremia due to Streptococcus  Resolved Problems:    * No resolved hospital problems. *      Discussion /Plan:     Pleural effusion of the right chest  Status postthoracentesis, consistent with exudative pleural effusion  We will repeat CT scan now  Patient's chest discomfort is more consistent with possible pleural effusion that may be getting worse  Initial CT scan was reviewed and did show some loculation to the pleural effusion on the right side            Pneumonia  Infectious diseases involved  Strep pneumonia 2 out of 2 blood cultures on upon admission. Continue with  Rocephin 2000 mg every 24 hours  Flagyl 500 mg every 8 hours    White count is coming down, no fevers or chills  Procalcitonin is coming down    We will hold on bronchoscopy, patient can eat        Mandy Clement MD    [facility] Pulmonary, Critical Care and Sleep Medicine  [phone]      Please note that some or all of this record was generated using voice recognition software. If there are any questions about the content of this document, please contact the author as some errors in transcription may have occurred.

## 2023-01-11 NOTE — PROGRESS NOTES
Infectious Disease Follow up Notes    CC :  pneumococcal pneumonia, bacteremia, empyema      Antibiotics:   Rocephin 2g q24  Flagyl 500 po TID     Admit Date:   1/7/2023  Hospital Day: 5    Subjective:   He remains AF on RA   About the same, R sided chest pain ant and posteriorly. Productive cough. Appetite is poor.       Objective:     Patient Vitals for the past 8 hrs:   BP Temp Temp src Pulse Resp SpO2   01/11/23 1136 (!) 151/94 98.8 °F (37.1 °C) -- 95 -- 93 %   01/11/23 0759 (!) 157/100 98.4 °F (36.9 °C) Oral 89 20 90 %         EXAM:  General:   alert, oriented, NAD    HEENT:   NCAT, PERRL,  sclera anicteric  MMM, no thrush  NECK:   supple     LUNGS:    decreased BS R base with rhonci, rub  CV:    RRR    ABD:  soft, flat, NT    EXT:  no focal rash         LINE:   PIV in place         Scheduled Meds:   lisinopril  10 mg Oral Daily    cefTRIAXone (ROCEPHIN) IV  2,000 mg IntraVENous Q24H    lactobacillus  1 capsule Oral BID WC    metroNIDAZOLE  500 mg Oral 3 times per day    aspirin  81 mg Oral Daily    enoxaparin  40 mg SubCUTAneous Daily    nicotine  1 patch TransDERmal Daily    insulin lispro  0-4 Units SubCUTAneous TID WC    insulin lispro  0-4 Units SubCUTAneous Nightly    amphetamine-dextroamphetamine  30 mg Oral BID AC    gabapentin  300 mg Oral TID       Continuous Infusions:   dextrose      sodium chloride Stopped (01/08/23 1209)          Data Review:    Lab Results   Component Value Date    WBC 16.8 (H) 01/11/2023    HGB 11.3 (L) 01/11/2023    HCT 33.9 (L) 01/11/2023    MCV 90.6 01/11/2023     (H) 01/11/2023     Lab Results   Component Value Date    CREATININE <0.5 (L) 01/11/2023    BUN 20 01/11/2023     (L) 01/11/2023    K 4.2 01/11/2023     01/11/2023    CO2 27 01/11/2023       Hepatic Function Panel:   Lab Results   Component Value Date/Time    ALKPHOS 116 07/23/2021 05:15 PM    ALT 23 07/23/2021 05:15 PM    AST 18 07/23/2021 05:15 PM    PROT 7.2 07/23/2021 05:15 PM    BILITOT 0.4 07/23/2021 05:15 PM    LABALBU 4.2 07/23/2021 05:15 PM         MICRO:  1/7 BC x2 S pneumoniae    Pleural fluid culture S pneumoniae   1/8 Legionella and pneumococcal ag neg   Sputum culture NRF   MRSA nasal screen neg     Streptococcus pneumoniae   Antibiotic Interpretation Microscan  Method Status    benzylpenicillin (meningitis) Sensitive <=0.06 mcg/mL BACTERIAL SUSCEPTIBILITY PANEL BY NASIMA     benzylpenicillin (oral) Sensitive <=0.06 mcg/mL BACTERIAL SUSCEPTIBILITY PANEL BY NASIMA     benzylpenicillin (pneumoniae) Sensitive <=0.06 mcg/mL BACTERIAL SUSCEPTIBILITY PANEL BY NASIMA     cefotaxime (meningitis) Sensitive <=0.12 mcg/mL BACTERIAL SUSCEPTIBILITY PANEL BY NASIMA     Cefotaxime (other) Sensitive <=0.12 mcg/mL BACTERIAL SUSCEPTIBILITY PANEL BY NASIMA     cefTRIAXone (meningitis) Sensitive <=0.12 mcg/mL BACTERIAL SUSCEPTIBILITY PANEL BY NASIMA     Ceftriaxone (other) Sensitive <=0.12 mcg/mL BACTERIAL SUSCEPTIBILITY PANEL BY NASIMA     erythromycin Sensitive <=0.12 mcg/mL BACTERIAL SUSCEPTIBILITY PANEL BY NASIMA     trimethoprim-sulfamethoxazole Sensitive <=10 mcg/mL BACTERIAL SUSCEPTIBILITY PANEL BY NASIMA     vancomycin Sensitive 0.25 mcg/mL BACTERIAL SUSCEPTIBILITY PANEL BY NASIMA         IMAGING:  CXR 1/10/23  Large consolidation right lower lobe of the lung and moderate consolidation   left perihilar region. Left lung base infiltrate. Small right pleural   effusion. There is no pneumothorax. These findings are largely unchanged. Normal cardiac size. CT chest 1/8/23  Impression   Multifocal pneumonia,right greater than left with tiny pleural effusions. A more focal nodular density in the left lower lobe is indeterminate   measuring 1.2 cm in size. Recommend short-term follow-up chest CT after   treatment approximately 4-6 weeks time to assess for change. Small mediastinal nodes are seen which may simply be reactive.      CT chest 1/11/23  Impression   Moderate bilateral pleural effusions, slightly increased as compared to   prior. Partial atelectasis of the posterior left lower lobe. Multifocal bilateral airspace disease, improving within the lingula and right   lower lobe though increasing within the right upper lobe as compared to   prior. Increased size of an AP window lymph node, likely reactive. Filling defects within the bronchus intermedius and left lower lobe bronchi,   which can be due to mucoid impaction or aspiration. Indeterminate 1.4 cm left lower lobe pulmonary nodule again noted, for which   short-term follow-up chest CT after treatment and resolution of any acute   symptoms is recommended to ensure resolution. If this does not resolve, it   is of a size amenable to evaluation by PET-CT.        Assessment:     Patient Active Problem List    Diagnosis Date Noted    Bandemia 01/10/2023     Priority: Medium    Thrombocytosis 01/10/2023     Priority: Medium    Normocytic normochromic anemia 01/10/2023     Priority: Medium    Uncontrolled hypertension 01/10/2023     Priority: Medium    Elevated BUN 01/10/2023     Priority: Medium    Elevated procalcitonin 01/10/2023     Priority: Medium    Pleuritic chest pain 01/10/2023     Priority: Medium    Hemoptysis 01/10/2023     Priority: Medium    Bacteremia due to Streptococcus 01/10/2023     Priority: Medium    Pneumonia of right lower lobe due to Streptococcus pneumoniae ([address] 01/09/2023     Priority: Medium    Empyema ([address] 01/09/2023     Priority: Medium    Leukocytosis 01/09/2023     Priority: Medium    DM2 (diabetes mellitus, type 2) ([address] 01/07/2023     Priority: Medium    Smoker 01/07/2023     Priority: Medium    ADHD 01/07/2023     Priority: Medium    History of sleeve gastrectomy 01/07/2023     Priority: Medium    Pneumonia of right lower lobe due to infectious organism 01/07/2023     Priority: Medium    Hypoxia 12/13/2017    Bronchitis 12/13/2017    CAP (community acquired pneumonia) due to Chlamydia species 12/13/2017       History of smoking, HTN     Admitted 1/7/23 with severe multifocal pneumococcal pneumonia with bacteremia and empyema   Multifocal infiltrates  Persistent, loculated fluid omid, R>L for which pleural drainage will probably be needed     Nodular density LLL of unclear significane for which R  adiology has suggested short term follow-up CT      WBC remains elevated but trending down   No fever and no supplemental oxygen requirement      History of C diff infection 3/2020 (+PCR, Rx vanc, no recurrence)     NKDA     Plan:     Continue rocephin, flagyl for now     Discussed with Pulm, appreciate your help managing empyema     I asked core lab to release the levo NASIMA and would transition to that at DC if S, planning total 3 week course     Encourage po intake, IS    Discussed with patient/family, all questions answered        Khari Eaosn MD  Phone: [phone]   Fax : [phone]

## 2023-01-12 ENCOUNTER — APPOINTMENT (OUTPATIENT)
Dept: GENERAL RADIOLOGY | Age: 63
DRG: 871 | End: 2023-01-12
Payer: MEDICARE

## 2023-01-12 LAB
ANION GAP SERPL CALCULATED.3IONS-SCNC: 7 MMOL/L (ref 3–16)
ANISOCYTOSIS: ABNORMAL
ANTI-NUCLEAR ANTIBODY (ANA): NEGATIVE
ANTI-SS-A IGG: <0.2 AI (ref 0–0.9)
ANTI-SS-B IGG: <0.2 AI (ref 0–0.9)
BANDED NEUTROPHILS RELATIVE PERCENT: 20 % (ref 0–7)
BASOPHILS ABSOLUTE: 0 K/UL (ref 0–0.2)
BASOPHILS RELATIVE PERCENT: 0 %
BUN BLDV-MCNC: 16 MG/DL (ref 7–20)
CALCIUM SERPL-MCNC: 7.4 MG/DL (ref 8.3–10.6)
CHLORIDE BLD-SCNC: 101 MMOL/L (ref 99–110)
CO2: 25 MMOL/L (ref 21–32)
CREAT SERPL-MCNC: <0.5 MG/DL (ref 0.8–1.3)
EOSINOPHILS ABSOLUTE: 0 K/UL (ref 0–0.6)
EOSINOPHILS RELATIVE PERCENT: 0 %
GFR SERPL CREATININE-BSD FRML MDRD: >60 ML/MIN/{1.73_M2}
GLUCOSE BLD-MCNC: 101 MG/DL (ref 70–99)
GLUCOSE BLD-MCNC: 107 MG/DL (ref 70–99)
GLUCOSE BLD-MCNC: 136 MG/DL (ref 70–99)
GLUCOSE BLD-MCNC: 140 MG/DL (ref 70–99)
GLUCOSE BLD-MCNC: 179 MG/DL (ref 70–99)
GLUCOSE BLD-MCNC: 200 MG/DL (ref 70–99)
HCT VFR BLD CALC: 32.7 % (ref 40.5–52.5)
HEMOGLOBIN: 11 G/DL (ref 13.5–17.5)
LYMPHOCYTES ABSOLUTE: 2.2 K/UL (ref 1–5.1)
LYMPHOCYTES RELATIVE PERCENT: 10 %
MCH RBC QN AUTO: 30.5 PG (ref 26–34)
MCHC RBC AUTO-ENTMCNC: 33.6 G/DL (ref 31–36)
MCV RBC AUTO: 90.7 FL (ref 80–100)
MONOCYTES ABSOLUTE: 0.7 K/UL (ref 0–1.3)
MONOCYTES RELATIVE PERCENT: 3 %
NEUTROPHILS ABSOLUTE: 19.1 K/UL (ref 1.7–7.7)
NEUTROPHILS RELATIVE PERCENT: 67 %
PDW BLD-RTO: 14.5 % (ref 12.4–15.4)
PERFORMED ON: ABNORMAL
PLATELET # BLD: 652 K/UL (ref 135–450)
PMV BLD AUTO: 7.9 FL (ref 5–10.5)
POTASSIUM SERPL-SCNC: 4.6 MMOL/L (ref 3.5–5.1)
RBC # BLD: 3.61 M/UL (ref 4.2–5.9)
SODIUM BLD-SCNC: 133 MMOL/L (ref 136–145)
WBC # BLD: 21.9 K/UL (ref 4–11)

## 2023-01-12 PROCEDURE — 2580000003 HC RX 258: Performed by: INTERNAL MEDICINE

## 2023-01-12 PROCEDURE — 1200000000 HC SEMI PRIVATE

## 2023-01-12 PROCEDURE — 6370000000 HC RX 637 (ALT 250 FOR IP): Performed by: INTERNAL MEDICINE

## 2023-01-12 PROCEDURE — 6370000000 HC RX 637 (ALT 250 FOR IP): Performed by: NURSE PRACTITIONER

## 2023-01-12 PROCEDURE — 94669 MECHANICAL CHEST WALL OSCILL: CPT

## 2023-01-12 PROCEDURE — 6360000002 HC RX W HCPCS: Performed by: INTERNAL MEDICINE

## 2023-01-12 PROCEDURE — 36415 COLL VENOUS BLD VENIPUNCTURE: CPT

## 2023-01-12 PROCEDURE — 94640 AIRWAY INHALATION TREATMENT: CPT

## 2023-01-12 PROCEDURE — 99232 SBSQ HOSP IP/OBS MODERATE 35: CPT | Performed by: INTERNAL MEDICINE

## 2023-01-12 PROCEDURE — 85025 COMPLETE CBC W/AUTO DIFF WBC: CPT

## 2023-01-12 PROCEDURE — 71045 X-RAY EXAM CHEST 1 VIEW: CPT

## 2023-01-12 PROCEDURE — 80048 BASIC METABOLIC PNL TOTAL CA: CPT

## 2023-01-12 RX ORDER — GUAIFENESIN 600 MG/1
600 TABLET, EXTENDED RELEASE ORAL 2 TIMES DAILY
Status: DISCONTINUED | OUTPATIENT
Start: 2023-01-12 | End: 2023-01-18 | Stop reason: HOSPADM

## 2023-01-12 RX ORDER — ALBUTEROL SULFATE 2.5 MG/3ML
2.5 SOLUTION RESPIRATORY (INHALATION) 4 TIMES DAILY
Status: DISCONTINUED | OUTPATIENT
Start: 2023-01-12 | End: 2023-01-15

## 2023-01-12 RX ORDER — ACETYLCYSTEINE 200 MG/ML
600 SOLUTION ORAL; RESPIRATORY (INHALATION) 2 TIMES DAILY
Status: DISCONTINUED | OUTPATIENT
Start: 2023-01-12 | End: 2023-01-14

## 2023-01-12 RX ADMIN — GUAIFENESIN 600 MG: 600 TABLET, EXTENDED RELEASE ORAL at 20:22

## 2023-01-12 RX ADMIN — LISINOPRIL 10 MG: 10 TABLET ORAL at 08:51

## 2023-01-12 RX ADMIN — ASPIRIN 81 MG: 81 TABLET, COATED ORAL at 08:51

## 2023-01-12 RX ADMIN — ACETAMINOPHEN 325MG 650 MG: 325 TABLET ORAL at 20:22

## 2023-01-12 RX ADMIN — Medication 1 CAPSULE: at 17:06

## 2023-01-12 RX ADMIN — HYDROMORPHONE HYDROCHLORIDE 1 MG: 1 INJECTION, SOLUTION INTRAMUSCULAR; INTRAVENOUS; SUBCUTANEOUS at 10:20

## 2023-01-12 RX ADMIN — ACETYLCYSTEINE 600 MG: 200 SOLUTION ORAL; RESPIRATORY (INHALATION) at 19:15

## 2023-01-12 RX ADMIN — HYDROMORPHONE HYDROCHLORIDE 1 MG: 1 INJECTION, SOLUTION INTRAMUSCULAR; INTRAVENOUS; SUBCUTANEOUS at 22:53

## 2023-01-12 RX ADMIN — HYDROMORPHONE HYDROCHLORIDE 1 MG: 1 INJECTION, SOLUTION INTRAMUSCULAR; INTRAVENOUS; SUBCUTANEOUS at 04:29

## 2023-01-12 RX ADMIN — METRONIDAZOLE 500 MG: 250 TABLET ORAL at 20:24

## 2023-01-12 RX ADMIN — CEFTRIAXONE 2000 MG: 2 INJECTION, POWDER, FOR SOLUTION INTRAMUSCULAR; INTRAVENOUS at 10:18

## 2023-01-12 RX ADMIN — METRONIDAZOLE 500 MG: 250 TABLET ORAL at 11:34

## 2023-01-12 RX ADMIN — DEXTROAMPHETAMINE SACCHARATE, AMPHETAMINE ASPARTATE, DEXTROAMPHETAMINE SULFATE, AMPHETAMINE SULFATE TABLETS, 10 MG,CLL 30 MG: 2.5; 2.5; 2.5; 2.5 TABLET ORAL at 06:14

## 2023-01-12 RX ADMIN — Medication 10 ML: at 08:51

## 2023-01-12 RX ADMIN — ALBUTEROL SULFATE 2.5 MG: 2.5 SOLUTION RESPIRATORY (INHALATION) at 19:15

## 2023-01-12 RX ADMIN — ENOXAPARIN SODIUM 40 MG: 100 INJECTION SUBCUTANEOUS at 08:51

## 2023-01-12 RX ADMIN — GABAPENTIN 300 MG: 300 CAPSULE ORAL at 08:51

## 2023-01-12 RX ADMIN — GABAPENTIN 300 MG: 300 CAPSULE ORAL at 20:21

## 2023-01-12 RX ADMIN — SODIUM CHLORIDE: 9 INJECTION, SOLUTION INTRAVENOUS at 10:15

## 2023-01-12 RX ADMIN — Medication 1 CAPSULE: at 08:51

## 2023-01-12 RX ADMIN — GABAPENTIN 300 MG: 300 CAPSULE ORAL at 13:41

## 2023-01-12 RX ADMIN — HYDROMORPHONE HYDROCHLORIDE 1 MG: 1 INJECTION, SOLUTION INTRAMUSCULAR; INTRAVENOUS; SUBCUTANEOUS at 17:06

## 2023-01-12 RX ADMIN — GUAIFENESIN 600 MG: 600 TABLET, EXTENDED RELEASE ORAL at 14:58

## 2023-01-12 RX ADMIN — DEXTROAMPHETAMINE SACCHARATE, AMPHETAMINE ASPARTATE, DEXTROAMPHETAMINE SULFATE, AMPHETAMINE SULFATE TABLETS, 10 MG,CLL 30 MG: 2.5; 2.5; 2.5; 2.5 TABLET ORAL at 14:58

## 2023-01-12 RX ADMIN — METRONIDAZOLE 500 MG: 250 TABLET ORAL at 06:13

## 2023-01-12 ASSESSMENT — PAIN SCALES - GENERAL
PAINLEVEL_OUTOF10: 8

## 2023-01-12 ASSESSMENT — PAIN DESCRIPTION - LOCATION: LOCATION: FLANK

## 2023-01-12 ASSESSMENT — PAIN SCALES - WONG BAKER: WONGBAKER_NUMERICALRESPONSE: 0

## 2023-01-12 ASSESSMENT — PAIN DESCRIPTION - ORIENTATION: ORIENTATION: RIGHT

## 2023-01-12 NOTE — PROGRESS NOTES
Hospitalist Progress Note      PCP: Nilay Sewell MD    Date of Admission: 1/7/2023    Chief Complaint: Fatigue    Hospital Course:   Tee Sargent is a 65 y.o. male. He presented to the eR from home. He c/o feeling ill for about a week. He describes feeling constitutionally ill w/ fatigue, malaise, loss of appetite. Along the way he has had a worsening cough, worsening dyspnea, and pretty marked right-sided pleuritic chest pain along the lateral and anterolater right costal margin. CXR with mild central pulmonary congestion. Small right pleural effusion. Thoracentesis with 300 ml fluid removed. Subjective: Watching [employer]. Pain in back on right side still. Pleuritic. Afebrile.        Medications:  Reviewed    Infusion Medications    dextrose      sodium chloride 25 mL/hr at 01/12/23 1015     Scheduled Medications    lisinopril  10 mg Oral Daily    cefTRIAXone (ROCEPHIN) IV  2,000 mg IntraVENous Q24H    lactobacillus  1 capsule Oral BID WC    metroNIDAZOLE  500 mg Oral 3 times per day    aspirin  81 mg Oral Daily    enoxaparin  40 mg SubCUTAneous Daily    nicotine  1 patch TransDERmal Daily    insulin lispro  0-4 Units SubCUTAneous TID WC    insulin lispro  0-4 Units SubCUTAneous Nightly    amphetamine-dextroamphetamine  30 mg Oral BID AC    gabapentin  300 mg Oral TID     PRN Meds: ipratropium-albuterol, glucose, dextrose bolus **OR** dextrose bolus, glucagon (rDNA), dextrose, sodium chloride flush, sodium chloride, potassium chloride **OR** potassium alternative oral replacement **OR** potassium chloride, magnesium sulfate, ondansetron **OR** ondansetron, acetaminophen **OR** acetaminophen, melatonin, calcium carbonate, HYDROcodone 5 mg - acetaminophen, ipratropium-albuterol, HYDROmorphone, albuterol, benzonatate      Intake/Output Summary (Last 24 hours) at 1/12/2023 1113  Last data filed at 1/12/2023 0854  Gross per 24 hour   Intake 840 ml   Output 1220 ml   Net -380 ml Physical Exam Performed:    BP (!) 144/98   Pulse 95   Temp 99.7 °F (37.6 °C) (Oral)   Resp 20   Ht 5' 8\" (1.727 m)   Wt 158 lb (71.7 kg)   SpO2 92%   BMI 24.02 kg/m²     General appearance: No apparent distress, appears stated age and cooperative. HEENT: Pupils equal, round, and reactive to light. Conjunctivae/corneas clear. Neck: Supple, with full range of motion. No jugular venous distention. Trachea midline. Respiratory:  Normal respiratory effort. Crackles in right lung base  Cardiovascular: Regular rate and rhythm with normal S1/S2 without murmurs, rubs or gallops. Abdomen: Soft, non-tender, non-distended with normal bowel sounds. Musculoskeletal: No clubbing, cyanosis or edema bilaterally. Full range of motion without deformity. Skin: Skin color, texture, turgor normal.  No rashes or lesions. Neurologic:  Neurovascularly intact without any focal sensory/motor deficits. Cranial nerves: II-XII intact, grossly non-focal.  Psychiatric: Alert and oriented, thought content appropriate, normal insight  Capillary Refill: Brisk, 3 seconds, normal   Peripheral Pulses: +2 palpable, equal bilaterally       Labs:   Recent Labs     01/10/23  0736 01/11/23  0644 01/12/23  0621   WBC 19.1* 16.8* 21.9*   HGB 10.4* 11.3* 11.0*   HCT 32.1* 33.9* 32.7*   * 511* 652*       Recent Labs     01/10/23  0736 01/11/23  0644 01/12/23  0621    133* 133*   K 4.3 4.2 4.6    100 101   CO2 27 27 25   BUN 36* 20 16   CREATININE <0.5* <0.5* <0.5*   CALCIUM 8.0* 7.6* 7.4*       No results for input(s): AST, ALT, BILIDIR, BILITOT, ALKPHOS in the last 72 hours. No results for input(s): INR in the last 72 hours. No results for input(s): Donah Keens in the last 72 hours.       Urinalysis:      Lab Results   Component Value Date/Time    NITRU Negative 07/23/2021 04:28 PM    [address] 0-2 07/23/2021 04:28 PM    RBCUA 3-4 07/23/2021 04:28 PM    BLOODU Negative 07/23/2021 04:28 PM    SPECGRAV >=1.030 07/23/2021 04:28 PM    GLUCOSEU Negative 07/23/2021 04:28 PM       Radiology:  CT CHEST WO CONTRAST   Final Result   Moderate bilateral pleural effusions, slightly increased as compared to   prior. Partial atelectasis of the posterior left lower lobe. Multifocal bilateral airspace disease, improving within the lingula and right   lower lobe though increasing within the right upper lobe as compared to   prior. Increased size of an AP window lymph node, likely reactive. Filling defects within the bronchus intermedius and left lower lobe bronchi,   which can be due to mucoid impaction or aspiration. Indeterminate 1.4 cm left lower lobe pulmonary nodule again noted, for which   short-term follow-up chest CT after treatment and resolution of any acute   symptoms is recommended to ensure resolution. If this does not resolve, it   is of a size amenable to evaluation by PET-CT. XR CHEST (2 VW)   Final Result   Bilateral pneumonias without any improvement. Follow-up advised. CT CHEST W CONTRAST   Final Result   Multifocal pneumonia,right greater than left with tiny pleural effusions. A more focal nodular density in the left lower lobe is indeterminate   measuring 1.2 cm in size. Recommend short-term follow-up chest CT after   treatment approximately 4-6 weeks time to assess for change. Small mediastinal nodes are seen which may simply be reactive. RECOMMENDATIONS:   *pulmonary management*         XR CHEST PORTABLE   Final Result   No pneumothorax identified. Redemonstration of bilateral airspace disease,   right greater than left. XR CHEST PORTABLE   Final Result   Mild central pulmonary congestion      Moderate airspace opacity right lung base and hazy opacities along the left   upper lobe and left lung base which could represent multisegmental   bronchopneumonia. Recommend short-term follow-up      Small questionable small right pleural effusion.       1.6 cm nodule left lung base laterally which is more apparent could represent   a small neoplasm.  Recommend CT correlation         XR CHEST PORTABLE    (Results Pending)       IP CONSULT TO HOSPITALIST  IP CONSULT TO INFECTIOUS DISEASES  IP CONSULT TO PULMONOLOGY    Assessment/Plan:    Active Hospital Problems    Diagnosis     Bandemia [D72.825]      Priority: Medium    Thrombocytosis [D75.839]      Priority: Medium    Normocytic normochromic anemia [D64.9]      Priority: Medium    Uncontrolled hypertension [I10]      Priority: Medium    Elevated BUN [R79.9]      Priority: Medium    Elevated procalcitonin [R79.89]      Priority: Medium    Pleuritic chest pain [R07.81]      Priority: Medium    Hemoptysis [R04.2]      Priority: Medium    Bacteremia due to Streptococcus [R78.81, B95.5]      Priority: Medium    Empyema (HCC) [J86.9]      Priority: Medium    Leukocytosis [D72.829]      Priority: Medium    DM2 (diabetes mellitus, type 2) (HCC) [E11.9]      Priority: Medium    Smoker [F17.200]      Priority: Medium    ADHD [F90.9]      Priority: Medium    History of sleeve gastrectomy [Z90.3]      Priority: Medium    Pneumonia of right lower lobe due to infectious organism [J18.9]      Priority: Medium     Community-acquired pneumonia, pneumococcal, Pneumonia complicated by parapneumonic effusion.  Continue IV Rocephin DC azithromycin. Flagyl added since empyema is sometimes polymicrobial.  Continue supplemental oxygen.  As needed pain medication for related pain. Pulm consulted and rec vest therapy     Strep pneumoniae bacteremia - Ceftriaxone. ID consulted. Likely 2-3 weeks total of abx. Repeat Blood cultures pending. WBC up again today. Serial CXR. May need to revisit need for IR drainage vs CTS pending WBC trend     Sepsis - w/ Leukocytosis/Tachycardia POArrival 2nd to above infection.  Continue IVF as appropriate and monitor clinical response w/ ABX as written.      Acute hypoxic respiratory failure: Secondary to severe  pneumonia. Continue supplemental oxygen and wean off as able. Pulmonary nodule: 1.6 cm nodule left lung base, concerning for small neoplasm on chest x-ray. We will get CT chest to further evaluate. HTN - w/out known CAD and no evidence of active signs/sxs of ischemia/failure. Currently controlled on home meds w/ vitals reviewed and documented as above. DM type II: Continue subcu insulin regimen including long-acting and short acting insulin as ordered, continue SSI protocol for more adequate glycemic control. ADHD: Continue outpatient Adderall    Chronic back pain - continue gabapentin    DVT Prophylaxis: Lovenox  Diet: ADULT DIET; Regular; 4 carb choices (60 gm/meal)  Code Status: Full Code  PT/OT Eval Status: Not indicated    Dispo - Pending clinical improvement.  Expect > 2 days     Appropriate for A1 Discharge Unit: Keren Patel DO

## 2023-01-12 NOTE — PROGRESS NOTES
End of shift report given to [facility]. Call light within reach, bed in lowest position, no needs at this time.

## 2023-01-12 NOTE — PLAN OF CARE
Problem: Pain  Goal: Verbalizes/displays adequate comfort level or baseline comfort level  1/12/2023 0256 by Jaquan Edwards RN  Outcome: Progressing   Continuing to assess pt's pain level and administer PRN medications as appropriate. Problem: Metabolic/Fluid and Electrolytes - Adult  Goal: Glucose maintained within prescribed range  Outcome: Progressing   Continuing to monitor blood glucose levels and administer ordered insulin as appropriate.

## 2023-01-12 NOTE — PROGRESS NOTES
Infectious Disease Follow up Notes    CC :  pneumococcal pneumonia, bacteremia, empyema      Antibiotics:   Rocephin 2g q24  Flagyl 500 po TID     Admit Date:   1/7/2023  Hospital Day: 6    Subjective:   Tm 100.2 last evening, today AF   On RA   Continued chest pain R side, coughing.     Objective:     Patient Vitals for the past 8 hrs:   BP Temp Temp src Pulse Resp SpO2 Weight   01/12/23 0838 (!) 140/92 98.1 °F (36.7 °C) Oral (!) 113 18 93 % --   01/12/23 0430 -- -- -- -- -- -- 158 lb (71.7 kg)   01/12/23 0426 (!) 145/92 99.3 °F (37.4 °C) -- 100 18 90 % --         EXAM:  General:   alert, oriented, NAD    HEENT:   NCAT, PERRL,  sclera anicteric  MMM, no thrush  NECK:   supple     LUNGS:   Rhonchi, rub post lung fields omid   CV:    RRR    ABD:  soft, flat, NT    EXT:  no focal rash         LINE:   PIV in place         Scheduled Meds:   lisinopril  10 mg Oral Daily    cefTRIAXone (ROCEPHIN) IV  2,000 mg IntraVENous Q24H    lactobacillus  1 capsule Oral BID WC    metroNIDAZOLE  500 mg Oral 3 times per day    aspirin  81 mg Oral Daily    enoxaparin  40 mg SubCUTAneous Daily    nicotine  1 patch TransDERmal Daily    insulin lispro  0-4 Units SubCUTAneous TID WC    insulin lispro  0-4 Units SubCUTAneous Nightly    amphetamine-dextroamphetamine  30 mg Oral BID AC    gabapentin  300 mg Oral TID       Continuous Infusions:   dextrose      sodium chloride 25 mL/hr at 01/12/23 1015          Data Review:    Lab Results   Component Value Date    WBC 21.9 (H) 01/12/2023    HGB 11.0 (L) 01/12/2023    HCT 32.7 (L) 01/12/2023    MCV 90.7 01/12/2023     (H) 01/12/2023     Lab Results   Component Value Date    CREATININE <0.5 (L) 01/12/2023    BUN 16 01/12/2023     (L) 01/12/2023    K 4.6 01/12/2023     01/12/2023    CO2 25 01/12/2023       Hepatic Function Panel:   Lab Results   Component Value Date/Time    [place] [id number] 07/23/2021 05:15 PM    ALT 23 07/23/2021 05:15 PM    AST 18 07/23/2021 05:15 PM    PROT 7.2 07/23/2021 05:15 PM    BILITOT 0.4 07/23/2021 05:15 PM    LABALBU 4.2 07/23/2021 05:15 PM         MICRO:  1/7 BC x2 S pneumoniae    Pleural fluid culture S pneumoniae   1/8 Legionella and pneumococcal ag neg   Sputum culture NRF   MRSA nasal screen neg   1/10 BC x2 NGTD       Streptococcus pneumoniae   Antibiotic Interpretation Microscan  Method Status    benzylpenicillin (meningitis) Sensitive <=0.06 mcg/mL BACTERIAL SUSCEPTIBILITY PANEL BY NASIMA     benzylpenicillin (oral) Sensitive <=0.06 mcg/mL BACTERIAL SUSCEPTIBILITY PANEL BY NASIMA     benzylpenicillin (pneumoniae) Sensitive <=0.06 mcg/mL BACTERIAL SUSCEPTIBILITY PANEL BY NASIMA     cefotaxime (meningitis) Sensitive <=0.12 mcg/mL BACTERIAL SUSCEPTIBILITY PANEL BY NASIMA     Cefotaxime (other) Sensitive <=0.12 mcg/mL BACTERIAL SUSCEPTIBILITY PANEL BY NASIMA     cefTRIAXone (meningitis) Sensitive <=0.12 mcg/mL BACTERIAL SUSCEPTIBILITY PANEL BY NASIMA     Ceftriaxone (other) Sensitive <=0.12 mcg/mL BACTERIAL SUSCEPTIBILITY PANEL BY NASIMA     erythromycin Sensitive <=0.12 mcg/mL BACTERIAL SUSCEPTIBILITY PANEL BY NASIMA     trimethoprim-sulfamethoxazole Sensitive <=10 mcg/mL BACTERIAL SUSCEPTIBILITY PANEL BY NASIMA     vancomycin Sensitive 0.25 mcg/mL BACTERIAL SUSCEPTIBILITY PANEL BY NASIMA       levofloxacin Sensitive 1 mcg/mL BACTERIAL SUSCEPTIBILITY PANEL BY NASIMA       IMAGING:  CXR 1/10/23  Large consolidation right lower lobe of the lung and moderate consolidation   left perihilar region. Left lung base infiltrate. Small right pleural   effusion. There is no pneumothorax. These findings are largely unchanged. Normal cardiac size. CT chest 1/8/23  Impression   Multifocal pneumonia,right greater than left with tiny pleural effusions. A more focal nodular density in the left lower lobe is indeterminate   measuring 1.2 cm in size.  Recommend short-term follow-up chest CT after   treatment approximately 4-6 weeks time to assess for change. Small mediastinal nodes are seen which may simply be reactive. CT chest 1/11/23  Impression   Moderate bilateral pleural effusions, slightly increased as compared to   prior. Partial atelectasis of the posterior left lower lobe. Multifocal bilateral airspace disease, improving within the lingula and right   lower lobe though increasing within the right upper lobe as compared to   prior. Increased size of an AP window lymph node, likely reactive. Filling defects within the bronchus intermedius and left lower lobe bronchi,   which can be due to mucoid impaction or aspiration. Indeterminate 1.4 cm left lower lobe pulmonary nodule again noted, for which   short-term follow-up chest CT after treatment and resolution of any acute   symptoms is recommended to ensure resolution. If this does not resolve, it   is of a size amenable to evaluation by PET-CT.        Assessment:     Patient Active Problem List    Diagnosis Date Noted    Bandemia 01/10/2023     Priority: Medium    Thrombocytosis 01/10/2023     Priority: Medium    Normocytic normochromic anemia 01/10/2023     Priority: Medium    Uncontrolled hypertension 01/10/2023     Priority: Medium    Elevated BUN 01/10/2023     Priority: Medium    Elevated procalcitonin 01/10/2023     Priority: Medium    Pleuritic chest pain 01/10/2023     Priority: Medium    Hemoptysis 01/10/2023     Priority: Medium    Bacteremia due to Streptococcus 01/10/2023     Priority: Medium    Pneumonia of right lower lobe due to Streptococcus pneumoniae ([address] 01/09/2023     Priority: Medium    Empyema ([address] 01/09/2023     Priority: Medium    Leukocytosis 01/09/2023     Priority: Medium    DM2 (diabetes mellitus, type 2) ([address] 01/07/2023     Priority: Medium    Smoker 01/07/2023     Priority: Medium    ADHD 01/07/2023     Priority: Medium    History of sleeve gastrectomy 01/07/2023     Priority: Medium    Pneumonia of right lower lobe due to infectious organism 01/07/2023     Priority: Medium    Hypoxia 12/13/2017    Bronchitis 12/13/2017    CAP (community acquired pneumonia) due to Chlamydia species 12/13/2017       History of smoking, HTN     Admitted 1/7/23 with severe multifocal pneumococcal pneumonia with bacteremia and empyema   Multifocal infiltrates  Persistent, loculated fluid omid, R>L for which pleural drainage will probably be needed     Nodular density LLL of unclear significane for which R  adiology has suggested short term follow-up CT      WBC remains elevated but trending down   No fever and no supplemental oxygen requirement      History of C diff infection 3/2020 (+PCR, Rx vanc, no recurrence)     NKDA     Plan:     Continue rocephin, flagyl for now     WBC is higher today, concern this represents undrained empyema     Serial CXR   Trend WBC   If WBC fails to normalize and fluid continues to accumulate, would revisit need for IR drainage vs CTS for decort       Discussed with patient/family, all questions answered  D/w RN Marrion Pallas, MD  Phone: [phone]   Fax : [phone]

## 2023-01-12 NOTE — PLAN OF CARE
Problem: Safety - Adult  Goal: Free from fall injury  Outcome: Progressing  Note: Pt will remain free from falls throughout hospital stay. Bed in lowest position with wheels locked and side rails 2/4 up. Hourly rounding completed. Patient ambulates safely independently, call light is within reach. Will continue to monitor throughout shift.       Problem: Pain  Goal: Verbalizes/displays adequate comfort level or baseline comfort level  1/12/2023 0957 by Dusty Hong RN  Outcome: Progressing  Note: Pt will be satisfied with pain control with PRN dilaudid and PRN norco. Pt uses numeric pain rating scale with reassessments after pain med administration. Will continue to monitor progression throughout shift.      Problem: Chronic Conditions and Co-morbidities  Goal: Patient's chronic conditions and co-morbidity symptoms are monitored and maintained or improved  Outcome: Progressing  Note: Pt will have accuchecks before meals and at bedtime with sliding scale insulin in place for coverage. Will continue to monitor for signs and symptoms of hypoglycemia and hyperglycemia throughout shift.

## 2023-01-12 NOTE — PROGRESS NOTES
Patient: Christiano Griffith Covert MRN: [id number]  Date of  Admission: 1/7/2023   YOB: 1960  Age: 65 y.o. Sex: male    Unit: [place] A2 CARD TELEMETRY  Room/Bed: [facility]2/0202-02 Admitting Physician: Franklyn Avila    Attending Physician:  Jorge Hull DO         Pulmonary Service Note  This is a 58-year-old gentleman who came to the emergency department because of shortness of breath and cough found to have a right-sided pleural effusion. Had a thoracentesis done. Which showed a exudative pleural effusion. Patient had infectious disease consulted. And infectious disease consulted pulmonary in regards to the pleural effusion seen on the CT scan.       SUBJECTIVE:    Overall seems to still have a little bit of chest discomfort on the right side  No chest pains or palpitations  No nausea vomiting  No dysuria hematuria    ROS:  A comprehensive review of systems was negative except for: above    OBJECTIVE    Medications    Continuous Infusions:   dextrose      sodium chloride Stopped (01/12/23 1133)       Scheduled Meds:   lisinopril  10 mg Oral Daily    cefTRIAXone (ROCEPHIN) IV  2,000 mg IntraVENous Q24H    lactobacillus  1 capsule Oral BID WC    metroNIDAZOLE  500 mg Oral 3 times per day    aspirin  81 mg Oral Daily    enoxaparin  40 mg SubCUTAneous Daily    nicotine  1 patch TransDERmal Daily    insulin lispro  0-4 Units SubCUTAneous TID WC    insulin lispro  0-4 Units SubCUTAneous Nightly    amphetamine-dextroamphetamine  30 mg Oral BID AC    gabapentin  300 mg Oral TID       PRN Meds:  ipratropium-albuterol, glucose, dextrose bolus **OR** dextrose bolus, glucagon (rDNA), dextrose, sodium chloride flush, sodium chloride, potassium chloride **OR** potassium alternative oral replacement **OR** potassium chloride, magnesium sulfate, ondansetron **OR** ondansetron, acetaminophen **OR** acetaminophen, melatonin, calcium carbonate, HYDROcodone 5 mg - acetaminophen, ipratropium-albuterol, HYDROmorphone, albuterol, benzonatate    Physical    Patient Vitals for the past 24 hrs:   BP Temp Temp src Pulse Resp SpO2 Weight   01/12/23 1111 (!) 144/98 99.7 °F (37.6 °C) Oral 95 20 92 % --   01/12/23 1050 -- -- -- -- 20 -- --   01/12/23 0838 (!) 140/92 98.1 °F (36.7 °C) Oral (!) 113 18 93 % --   01/12/23 0430 -- -- -- -- -- -- 158 lb (71.7 kg)   01/12/23 0426 (!) 145/92 99.3 °F (37.4 °C) -- 100 18 90 % --   01/12/23 0000 (!) 162/96 99.5 °F (37.5 °C) Oral (!) 102 18 92 % --   01/11/23 2325 -- -- -- -- 18 -- --   01/11/23 1954 (!) 148/96 100.2 °F (37.9 °C) Oral 100 18 90 % --   01/11/23 1637 -- -- -- -- 22 -- --   01/11/23 1607 (!) 151/97 98.6 °F (37 °C) Oral 98 22 91 % --          Physical Exam  Vitals and nursing note reviewed. Constitutional:       General: He is not in acute distress. Appearance: Normal appearance. HENT:      Head: Normocephalic and atraumatic. Right Ear: External ear normal.      Left Ear: External ear normal.      Nose: Nose normal.      Mouth/Throat:      Mouth: Mucous membranes are moist.      Pharynx: Oropharynx is clear. Eyes:      General:         Right eye: No discharge. Left eye: No discharge. Extraocular Movements: Extraocular movements intact. Conjunctiva/sclera: Conjunctivae normal.      Pupils: Pupils are equal, round, and reactive to light. Cardiovascular:      Rate and Rhythm: Normal rate and regular rhythm. Pulses: Normal pulses. Heart sounds: No murmur heard. Pulmonary:      Effort: No respiratory distress. Breath sounds: Rhonchi and rales present. Comments: Rhonchi and rales at the right lung base  Abdominal:      General: Bowel sounds are normal. There is no distension. Palpations: Abdomen is soft. There is no mass. Tenderness: There is no abdominal tenderness. Hernia: No hernia is present. Musculoskeletal:         General: No swelling. Normal range of motion. Cervical back: Normal range of motion. No rigidity. Skin:     General: Skin is warm and dry. Coloration: Skin is not jaundiced or pale. Neurological:      General: No focal deficit present. Mental Status: He is alert and oriented to person, place, and time. Mental status is at baseline. Cranial Nerves: No cranial nerve deficit. Sensory: No sensory deficit. Psychiatric:         Mood and Affect: Mood normal.         Behavior: Behavior normal.         Thought Content: Thought content normal.            Weight  Weight change: -3 lb 4.8 oz (-1.497 kg)      Labs:   Recent Labs     01/10/23  0736 01/11/23  0644 01/12/23  0621   WBC 19.1* 16.8* 21.9*   HGB 10.4* 11.3* 11.0*   HCT 32.1* 33.9* 32.7*   * 511* 652*        Recent Labs     01/10/23  0736 01/11/23 0644 01/12/23 0621    133* 133*   K 4.3 4.2 4.6    100 101   CO2 27 27 25   BUN 36* 20 16   GLUCOSE 88 89 140*         Additional labs      Radiology, images personally reviewed. XR CHEST (2 VW) [[id number]] Collected: 01/10/23 0747      Order Status: Completed Specimen: Chest Updated: 01/10/23 0751     Narrative:       EXAMINATION:   TWO XRAY VIEWS OF THE CHEST     1/10/2023 7:38 am     COMPARISON:   January 7, 2023     HISTORY:   ORDERING SYSTEM PROVIDED HISTORY: pna, empyema   TECHNOLOGIST PROVIDED HISTORY:   Reason for exam:->pna, empyema     FINDINGS:   Large consolidation right lower lobe of the lung and moderate consolidation   left perihilar region. Left lung base infiltrate. Small right pleural   effusion. There is no pneumothorax. These findings are largely unchanged. Normal cardiac size. Moderate osteopenic changes and degenerative changes noted in the bony   structures. Impression:       Bilateral pneumonias without any improvement. Follow-up advised.       XR CHEST PORTABLE [[id number]] Collected: 01/12/23 1123      Order Status: Completed Updated: 01/12/23 1127     Narrative:       EXAMINATION:   ONE XRAY VIEW OF THE CHEST     1/12/2023 11:09 am COMPARISON:   01/10/2023 radiograph     HISTORY:   ORDERING SYSTEM PROVIDED HISTORY: pna, empyema   TECHNOLOGIST PROVIDED HISTORY:   Reason for exam:->pna, empyema   Reason for Exam: pna, empyema     FINDINGS:   The heart and mediastinum are normal.  Mild perihilar opacities are stable on   the right greater than left. Dense airspace opacification persists in the   right mid and lower lung zone. Small pleural effusion. No skeletal finding. Impression:       Slightly improved aeration in the lungs from prior exam.  Dense airspace   changes persist in the right lower lung zone with a small pleural effusion. Assessment:     Principal Problem:    Pneumonia of right lower lobe due to infectious organism  Active Problems:    DM2 (diabetes mellitus, type 2) ([facility])    Smoker    ADHD    History of sleeve gastrectomy    Empyema (HCC)    Leukocytosis    Bandemia    Thrombocytosis    Normocytic normochromic anemia    Uncontrolled hypertension    Elevated BUN    Elevated procalcitonin    Pleuritic chest pain    Hemoptysis    Bacteremia due to Streptococcus  Resolved Problems:    * No resolved hospital problems. *      Discussion /Plan:     Pleural effusion of the right chest  Status postthoracentesis, consistent with exudative pleural effusion    Initial CT scan was reviewed and did show some loculation to the pleural effusion on the right side  Repeat CT scan of the chest was reviewed with radiologist and there was not enough fluid to safely do a thoracentesis  I suspect that the pleural effusion is probably loculated self and most likely will resolve with continued antibiotics  Will probably need a CT scan within the next 4 to 6 weeks as an outpatient          Pneumonia  Infectious diseases involved  Strep pneumonia 2 out of 2 blood cultures on upon admission.     Continue with  Rocephin 2000 mg every 24 hours  Flagyl 500 mg every 8 hours    White count is coming down, no fevers or chills  Procalcitonin is coming down    We will go ahead and start  Mucinex therapy  Mucomyst with albuterol nebulized twice daily  Vest therapy  Patient should resolve relatively quickly with the above therapies      MD Rafael Melgar Pulmonary, Critical Care and Sleep Medicine  [phone]      Please note that some or all of this record was generated using voice recognition software. If there are any questions about the content of this document, please contact the author as some errors in transcription may have occurred.

## 2023-01-13 ENCOUNTER — APPOINTMENT (OUTPATIENT)
Dept: CT IMAGING | Age: 63
DRG: 871 | End: 2023-01-13
Payer: MEDICARE

## 2023-01-13 ENCOUNTER — APPOINTMENT (OUTPATIENT)
Dept: GENERAL RADIOLOGY | Age: 63
DRG: 871 | End: 2023-01-13
Payer: MEDICARE

## 2023-01-13 LAB
ANION GAP SERPL CALCULATED.3IONS-SCNC: 7 MMOL/L (ref 3–16)
ANISOCYTOSIS: ABNORMAL
ATYPICAL LYMPHOCYTE RELATIVE PERCENT: 2 % (ref 0–6)
BANDED NEUTROPHILS RELATIVE PERCENT: 8 % (ref 0–7)
BASOPHILS ABSOLUTE: 0 K/UL (ref 0–0.2)
BASOPHILS RELATIVE PERCENT: 0 %
BUN BLDV-MCNC: 14 MG/DL (ref 7–20)
CALCIUM SERPL-MCNC: 7.5 MG/DL (ref 8.3–10.6)
CHLORIDE BLD-SCNC: 98 MMOL/L (ref 99–110)
CO2: 25 MMOL/L (ref 21–32)
CREAT SERPL-MCNC: <0.5 MG/DL (ref 0.8–1.3)
EOSINOPHILS ABSOLUTE: 0.2 K/UL (ref 0–0.6)
EOSINOPHILS RELATIVE PERCENT: 1 %
GFR SERPL CREATININE-BSD FRML MDRD: >60 ML/MIN/{1.73_M2}
GLUCOSE BLD-MCNC: 100 MG/DL (ref 70–99)
GLUCOSE BLD-MCNC: 113 MG/DL (ref 70–99)
GLUCOSE BLD-MCNC: 116 MG/DL (ref 70–99)
GLUCOSE BLD-MCNC: 121 MG/DL (ref 70–99)
GLUCOSE BLD-MCNC: 194 MG/DL (ref 70–99)
HCT VFR BLD CALC: 33.5 % (ref 40.5–52.5)
HEMOGLOBIN: 11.3 G/DL (ref 13.5–17.5)
IGE: 242 KU/L
LYMPHOCYTES ABSOLUTE: 2.5 K/UL (ref 1–5.1)
LYMPHOCYTES RELATIVE PERCENT: 9 %
MCH RBC QN AUTO: 30.5 PG (ref 26–34)
MCHC RBC AUTO-ENTMCNC: 33.8 G/DL (ref 31–36)
MCV RBC AUTO: 90.2 FL (ref 80–100)
MONOCYTES ABSOLUTE: 0.9 K/UL (ref 0–1.3)
MONOCYTES RELATIVE PERCENT: 4 %
NEUTROPHILS ABSOLUTE: 19.2 K/UL (ref 1.7–7.7)
NEUTROPHILS RELATIVE PERCENT: 76 %
PDW BLD-RTO: 14.1 % (ref 12.4–15.4)
PERFORMED ON: ABNORMAL
PLATELET # BLD: 764 K/UL (ref 135–450)
PMV BLD AUTO: 7.6 FL (ref 5–10.5)
POLYCHROMASIA: ABNORMAL
POTASSIUM SERPL-SCNC: 4.1 MMOL/L (ref 3.5–5.1)
RBC # BLD: 3.71 M/UL (ref 4.2–5.9)
SODIUM BLD-SCNC: 130 MMOL/L (ref 136–145)
WBC # BLD: 22.9 K/UL (ref 4–11)

## 2023-01-13 PROCEDURE — 85025 COMPLETE CBC W/AUTO DIFF WBC: CPT

## 2023-01-13 PROCEDURE — 99232 SBSQ HOSP IP/OBS MODERATE 35: CPT | Performed by: INTERNAL MEDICINE

## 2023-01-13 PROCEDURE — 6370000000 HC RX 637 (ALT 250 FOR IP): Performed by: INTERNAL MEDICINE

## 2023-01-13 PROCEDURE — 80048 BASIC METABOLIC PNL TOTAL CA: CPT

## 2023-01-13 PROCEDURE — 6360000002 HC RX W HCPCS: Performed by: INTERNAL MEDICINE

## 2023-01-13 PROCEDURE — 94669 MECHANICAL CHEST WALL OSCILL: CPT

## 2023-01-13 PROCEDURE — 6370000000 HC RX 637 (ALT 250 FOR IP): Performed by: NURSE PRACTITIONER

## 2023-01-13 PROCEDURE — 2580000003 HC RX 258: Performed by: INTERNAL MEDICINE

## 2023-01-13 PROCEDURE — 71250 CT THORAX DX C-: CPT

## 2023-01-13 PROCEDURE — 94761 N-INVAS EAR/PLS OXIMETRY MLT: CPT

## 2023-01-13 PROCEDURE — 2700000000 HC OXYGEN THERAPY PER DAY

## 2023-01-13 PROCEDURE — 1200000000 HC SEMI PRIVATE

## 2023-01-13 PROCEDURE — 71045 X-RAY EXAM CHEST 1 VIEW: CPT

## 2023-01-13 PROCEDURE — 94640 AIRWAY INHALATION TREATMENT: CPT

## 2023-01-13 PROCEDURE — 36415 COLL VENOUS BLD VENIPUNCTURE: CPT

## 2023-01-13 RX ADMIN — BENZONATATE 200 MG: 100 CAPSULE ORAL at 20:18

## 2023-01-13 RX ADMIN — HYDROMORPHONE HYDROCHLORIDE 1 MG: 1 INJECTION, SOLUTION INTRAMUSCULAR; INTRAVENOUS; SUBCUTANEOUS at 10:35

## 2023-01-13 RX ADMIN — HYDROMORPHONE HYDROCHLORIDE 1 MG: 1 INJECTION, SOLUTION INTRAMUSCULAR; INTRAVENOUS; SUBCUTANEOUS at 17:45

## 2023-01-13 RX ADMIN — ALBUTEROL SULFATE 2.5 MG: 2.5 SOLUTION RESPIRATORY (INHALATION) at 08:15

## 2023-01-13 RX ADMIN — DEXTROAMPHETAMINE SACCHARATE, AMPHETAMINE ASPARTATE, DEXTROAMPHETAMINE SULFATE, AMPHETAMINE SULFATE TABLETS, 10 MG,CLL 30 MG: 2.5; 2.5; 2.5; 2.5 TABLET ORAL at 05:55

## 2023-01-13 RX ADMIN — HYDROMORPHONE HYDROCHLORIDE 1 MG: 1 INJECTION, SOLUTION INTRAMUSCULAR; INTRAVENOUS; SUBCUTANEOUS at 07:26

## 2023-01-13 RX ADMIN — GUAIFENESIN 600 MG: 600 TABLET, EXTENDED RELEASE ORAL at 08:44

## 2023-01-13 RX ADMIN — ALBUTEROL SULFATE 2.5 MG: 2.5 SOLUTION RESPIRATORY (INHALATION) at 15:50

## 2023-01-13 RX ADMIN — ALBUTEROL SULFATE 2.5 MG: 2.5 SOLUTION RESPIRATORY (INHALATION) at 11:34

## 2023-01-13 RX ADMIN — METRONIDAZOLE 500 MG: 250 TABLET ORAL at 20:19

## 2023-01-13 RX ADMIN — LISINOPRIL 10 MG: 10 TABLET ORAL at 08:44

## 2023-01-13 RX ADMIN — GABAPENTIN 300 MG: 300 CAPSULE ORAL at 08:44

## 2023-01-13 RX ADMIN — DEXTROAMPHETAMINE SACCHARATE, AMPHETAMINE ASPARTATE, DEXTROAMPHETAMINE SULFATE, AMPHETAMINE SULFATE TABLETS, 10 MG,CLL 30 MG: 2.5; 2.5; 2.5; 2.5 TABLET ORAL at 14:08

## 2023-01-13 RX ADMIN — ENOXAPARIN SODIUM 40 MG: 100 INJECTION SUBCUTANEOUS at 08:45

## 2023-01-13 RX ADMIN — GABAPENTIN 300 MG: 300 CAPSULE ORAL at 20:19

## 2023-01-13 RX ADMIN — HYDROCODONE BITARTRATE AND ACETAMINOPHEN 1 TABLET: 5; 325 TABLET ORAL at 20:19

## 2023-01-13 RX ADMIN — HYDROMORPHONE HYDROCHLORIDE 1 MG: 1 INJECTION, SOLUTION INTRAMUSCULAR; INTRAVENOUS; SUBCUTANEOUS at 21:00

## 2023-01-13 RX ADMIN — METRONIDAZOLE 500 MG: 250 TABLET ORAL at 05:55

## 2023-01-13 RX ADMIN — ACETYLCYSTEINE 600 MG: 200 SOLUTION ORAL; RESPIRATORY (INHALATION) at 19:35

## 2023-01-13 RX ADMIN — ALBUTEROL SULFATE 2.5 MG: 2.5 SOLUTION RESPIRATORY (INHALATION) at 19:35

## 2023-01-13 RX ADMIN — ACETYLCYSTEINE 600 MG: 200 SOLUTION ORAL; RESPIRATORY (INHALATION) at 08:15

## 2023-01-13 RX ADMIN — Medication 1 CAPSULE: at 17:43

## 2023-01-13 RX ADMIN — SODIUM CHLORIDE: 9 INJECTION, SOLUTION INTRAVENOUS at 10:22

## 2023-01-13 RX ADMIN — ASPIRIN 81 MG: 81 TABLET, COATED ORAL at 08:44

## 2023-01-13 RX ADMIN — GABAPENTIN 300 MG: 300 CAPSULE ORAL at 14:08

## 2023-01-13 RX ADMIN — Medication 10 ML: at 08:44

## 2023-01-13 RX ADMIN — Medication 10 ML: at 20:19

## 2023-01-13 RX ADMIN — CEFTRIAXONE 2000 MG: 2 INJECTION, POWDER, FOR SOLUTION INTRAMUSCULAR; INTRAVENOUS at 10:22

## 2023-01-13 RX ADMIN — HYDROMORPHONE HYDROCHLORIDE 1 MG: 1 INJECTION, SOLUTION INTRAMUSCULAR; INTRAVENOUS; SUBCUTANEOUS at 14:08

## 2023-01-13 RX ADMIN — GUAIFENESIN 600 MG: 600 TABLET, EXTENDED RELEASE ORAL at 20:19

## 2023-01-13 RX ADMIN — METRONIDAZOLE 500 MG: 250 TABLET ORAL at 11:07

## 2023-01-13 RX ADMIN — Medication 1 CAPSULE: at 08:44

## 2023-01-13 ASSESSMENT — PAIN SCALES - GENERAL
PAINLEVEL_OUTOF10: 8
PAINLEVEL_OUTOF10: 0
PAINLEVEL_OUTOF10: 8
PAINLEVEL_OUTOF10: 0
PAINLEVEL_OUTOF10: 0

## 2023-01-13 ASSESSMENT — PAIN DESCRIPTION - LOCATION
LOCATION: CHEST
LOCATION: BACK;CHEST
LOCATION: BACK;CHEST
LOCATION: CHEST
LOCATION: CHEST;BACK

## 2023-01-13 ASSESSMENT — PAIN DESCRIPTION - DESCRIPTORS
DESCRIPTORS: CRAMPING
DESCRIPTORS: ACHING;SQUEEZING
DESCRIPTORS: ACHING;SQUEEZING
DESCRIPTORS: STABBING;ACHING

## 2023-01-13 ASSESSMENT — PAIN SCALES - WONG BAKER
WONGBAKER_NUMERICALRESPONSE: 0
WONGBAKER_NUMERICALRESPONSE: 4

## 2023-01-13 ASSESSMENT — PAIN DESCRIPTION - ORIENTATION
ORIENTATION: RIGHT;LEFT
ORIENTATION: MID
ORIENTATION: RIGHT;LEFT
ORIENTATION: MID
ORIENTATION: MID

## 2023-01-13 ASSESSMENT — PAIN - FUNCTIONAL ASSESSMENT: PAIN_FUNCTIONAL_ASSESSMENT: PREVENTS OR INTERFERES SOME ACTIVE ACTIVITIES AND ADLS

## 2023-01-13 NOTE — PLAN OF CARE
Problem: Safety - Adult  Goal: Free from fall injury  1/13/2023 1125 by Rosalva Martinez RN  Outcome: Progressing  Note: Pt will remain free from falls throughout hospital stay. Fall precautions in place, bed in lowest position with wheels locked and side rails 2/4 up. Room door open and hourly rounding completed. Will continue to monitor throughout shift. Problem: Pain  Goal: Verbalizes/displays adequate comfort level or baseline comfort level  1/13/2023 1125 by Rosalva Martinez RN  Outcome: Progressing  Note: Pt will be satisfied with pain control. Pt uses numeric pain rating scale with reassessments after pain med administration. Will continue to monitor progression throughout shift. Problem: Chronic Conditions and Co-morbidities  Goal: Patient's chronic conditions and co-morbidity symptoms are monitored and maintained or improved  1/13/2023 1125 by Rosalva Martinez RN  Outcome: Progressing  Note: Pt will have accuchecks before meals and at bedtime with sliding scale insulin in place for coverage. Will continue to monitor for signs and symptoms of hypoglycemia and hyperglycemia throughout shift.

## 2023-01-13 NOTE — PROGRESS NOTES
Hospitalist Progress Note      PCP: Geoff Hickman MD    Date of Admission: 1/7/2023    Chief Complaint: Fatigue    Hospital Course:   Vlad Chan is a 65 y.o. male. He presented to the eR from home. He c/o feeling ill for about a week. He describes feeling constitutionally ill w/ fatigue, malaise, loss of appetite. Along the way he has had a worsening cough, worsening dyspnea, and pretty marked right-sided pleuritic chest pain along the lateral and anterolater right costal margin. CXR with mild central pulmonary congestion. Small right pleural effusion. Thoracentesis with 300 ml fluid removed. Subjective:  Pain in back on right side still. Pleuritic.  No chest pain      Medications:  Reviewed    Infusion Medications    dextrose      sodium chloride Stopped (01/12/23 1133)     Scheduled Medications    guaiFENesin  600 mg Oral BID    acetylcysteine  600 mg Inhalation BID    albuterol  2.5 mg Nebulization 4x daily    lisinopril  10 mg Oral Daily    cefTRIAXone (ROCEPHIN) IV  2,000 mg IntraVENous Q24H    lactobacillus  1 capsule Oral BID WC    metroNIDAZOLE  500 mg Oral 3 times per day    aspirin  81 mg Oral Daily    enoxaparin  40 mg SubCUTAneous Daily    nicotine  1 patch TransDERmal Daily    insulin lispro  0-4 Units SubCUTAneous TID WC    insulin lispro  0-4 Units SubCUTAneous Nightly    amphetamine-dextroamphetamine  30 mg Oral BID AC    gabapentin  300 mg Oral TID     PRN Meds: ipratropium-albuterol, glucose, dextrose bolus **OR** dextrose bolus, glucagon (rDNA), dextrose, sodium chloride flush, sodium chloride, potassium chloride **OR** potassium alternative oral replacement **OR** potassium chloride, magnesium sulfate, ondansetron **OR** ondansetron, acetaminophen **OR** acetaminophen, melatonin, calcium carbonate, HYDROcodone 5 mg - acetaminophen, ipratropium-albuterol, HYDROmorphone, albuterol, benzonatate      Intake/Output Summary (Last 24 hours) at 1/13/2023 0802  Last data filed at 1/13/2023 0400  Gross per 24 hour   Intake 1391.36 ml   Output 1770 ml   Net -378.64 ml         Physical Exam Performed:    BP (!) 145/89   Pulse (!) 102   Temp 98.6 °F (37 °C) (Oral)   Resp 17   Ht 5' 8\" (1.727 m)   Wt 154 lb 14.4 oz (70.3 kg)   SpO2 90%   BMI 23.55 kg/m²     General appearance: No apparent distress, appears stated age and cooperative. HEENT: Pupils equal, round, and reactive to light. Conjunctivae/corneas clear. Neck: Supple, with full range of motion. No jugular venous distention. Trachea midline. Respiratory:  Normal respiratory effort. Crackles in right lung base  Cardiovascular: Regular rate and rhythm with normal S1/S2 without murmurs, rubs or gallops. Abdomen: Soft, non-tender, non-distended with normal bowel sounds. Musculoskeletal: No clubbing, cyanosis or edema bilaterally. Full range of motion without deformity. Skin: Skin color, texture, turgor normal.  No rashes or lesions. Neurologic:  Neurovascularly intact without any focal sensory/motor deficits. Cranial nerves: II-XII intact, grossly non-focal.  Psychiatric: Alert and oriented, thought content appropriate, normal insight  Capillary Refill: Brisk, 3 seconds, normal   Peripheral Pulses: +2 palpable, equal bilaterally       Labs:   Recent Labs     01/11/23  0644 01/12/23  0621 01/13/23  0648   WBC 16.8* 21.9* 22.9*   HGB 11.3* 11.0* 11.3*   HCT 33.9* 32.7* 33.5*   * 652* 764*       Recent Labs     01/11/23  0644 01/12/23  0621 01/13/23  0648   * 133* 130*   K 4.2 4.6 4.1    101 98*   CO2 27 25 25   BUN 20 16 14   CREATININE <0.5* <0.5* <0.5*   CALCIUM 7.6* 7.4* 7.5*       No results for input(s): AST, ALT, BILIDIR, BILITOT, ALKPHOS in the last 72 hours. No results for input(s): INR in the last 72 hours. No results for input(s): [place] in the last 72 hours.       Urinalysis:      Lab Results   Component Value Date/Time    NITRU Negative 07/23/2021 04:28 PM    WBCUA 0-2 07/23/2021 04:28 PM    RBCUA 3-4 07/23/2021 04:28 PM    BLOODU Negative 07/23/2021 04:28 PM    SPECGRAV >=1.030 07/23/2021 04:28 PM    GLUCOSEU Negative 07/23/2021 04:28 PM       Radiology:  XR CHEST PORTABLE   Final Result   Slightly improved aeration in the lungs from prior exam.  Dense airspace   changes persist in the right lower lung zone with a small pleural effusion. CT CHEST WO CONTRAST   Final Result   Moderate bilateral pleural effusions, slightly increased as compared to   prior. Partial atelectasis of the posterior left lower lobe. Multifocal bilateral airspace disease, improving within the lingula and right   lower lobe though increasing within the right upper lobe as compared to   prior. Increased size of an AP window lymph node, likely reactive. Filling defects within the bronchus intermedius and left lower lobe bronchi,   which can be due to mucoid impaction or aspiration. Indeterminate 1.4 cm left lower lobe pulmonary nodule again noted, for which   short-term follow-up chest CT after treatment and resolution of any acute   symptoms is recommended to ensure resolution. If this does not resolve, it   is of a size amenable to evaluation by PET-CT. XR CHEST (2 VW)   Final Result   Bilateral pneumonias without any improvement. Follow-up advised. CT CHEST W CONTRAST   Final Result   Multifocal pneumonia,right greater than left with tiny pleural effusions. A more focal nodular density in the left lower lobe is indeterminate   measuring 1.2 cm in size. Recommend short-term follow-up chest CT after   treatment approximately 4-6 weeks time to assess for change. Small mediastinal nodes are seen which may simply be reactive. RECOMMENDATIONS:   *pulmonary management*         XR CHEST PORTABLE   Final Result   No pneumothorax identified. Redemonstration of bilateral airspace disease,   right greater than left.          XR CHEST PORTABLE   Final Result   Mild central pulmonary congestion      Moderate airspace opacity right lung base and hazy opacities along the left   upper lobe and left lung base which could represent multisegmental   bronchopneumonia. Recommend short-term follow-up      Small questionable small right pleural effusion. 1.6 cm nodule left lung base laterally which is more apparent could represent   a small neoplasm. Recommend CT correlation         XR CHEST PORTABLE    (Results Pending)       IP CONSULT TO HOSPITALIST  IP CONSULT TO INFECTIOUS DISEASES  IP CONSULT TO PULMONOLOGY    Assessment/Plan:    Active Hospital Problems    Diagnosis     Bandemia [D72.825]      Priority: Medium    Thrombocytosis [D75.839]      Priority: Medium    Normocytic normochromic anemia [D64.9]      Priority: Medium    Uncontrolled hypertension [I10]      Priority: Medium    Elevated BUN [R79.9]      Priority: Medium    Elevated procalcitonin [R79.89]      Priority: Medium    Pleuritic chest pain [R07.81]      Priority: Medium    Hemoptysis [R04.2]      Priority: Medium    Bacteremia due to Streptococcus [R78.81, B95.5]      Priority: Medium    Empyema (HCC) [J86.9]      Priority: Medium    Leukocytosis [D72.829]      Priority: Medium    DM2 (diabetes mellitus, type 2) ([facility] [address] [E11.9]      Priority: Medium    Smoker [F17.200]      Priority: Medium    ADHD [F90.9]      Priority: Medium    History of sleeve gastrectomy [Z90.3]      Priority: Medium    Pneumonia of right lower lobe due to infectious organism [J18.9]      Priority: Medium     Community-acquired pneumonia, pneumococcal, Pneumonia complicated by parapneumonic effusion. Continue IV Rocephin DC azithromycin. Flagyl added since empyema is sometimes polymicrobial.  Continue supplemental oxygen. As needed pain medication for related pain. Pulm consulted and rec vest therapy     Strep pneumoniae bacteremia - Ceftriaxone. ID consulted. Likely 2-3 weeks total of abx. Repeat Blood cultures NGTD.  WBC count elevated    Sepsis - w/ Leukocytosis/Tachycardia POArrival 2nd to above infection. Continue IVF as appropriate and monitor clinical response w/ ABX as written. Acute hypoxic respiratory failure: Secondary to severe pneumonia. Continue supplemental oxygen and wean off as able. Pulmonary nodule: 1.6 cm nodule left lung base, concerning for small neoplasm on chest x-ray. We will get CT chest to further evaluate. HTN - w/out known CAD and no evidence of active signs/sxs of ischemia/failure. Currently controlled on home meds w/ vitals reviewed and documented as above. DM type II: Continue subcu insulin regimen including long-acting and short acting insulin as ordered, continue SSI protocol for more adequate glycemic control. ADHD: Continue outpatient Adderall    Chronic back pain - continue gabapentin    DVT Prophylaxis: Lovenox  Diet: ADULT DIET; Regular; 4 carb choices (60 gm/meal)  Code Status: Full Code  PT/OT Eval Status: Not indicated    Dispo - Pending clinical improvement.  Expect > 2 days     Appropriate for A1 Discharge Unit: Keren Batista DO

## 2023-01-13 NOTE — PROGRESS NOTES
Patient: nAgie Sinclair Covert MRN: [id number]  Date of  Admission: 1/7/2023   YOB: 1960  Age: 65 y.o. Sex: male    Unit: [place] A2 CARD TELEMETRY  Room/Bed: 0202/0202-02 Admitting Physician: Pablo Carpenter    Attending Physician:  Meena Retana,          Pulmonary Service Note  This is a 30-year-old gentleman who came to the emergency department because of shortness of breath and cough found to have a right-sided pleural effusion. Had a thoracentesis done. Which showed a exudative pleural effusion. Patient had infectious disease consulted. And infectious disease consulted pulmonary in regards to the pleural effusion seen on the CT scan.       SUBJECTIVE:      Started on vest therapy today  Still having some chest discomfort  Vest therapy helping with more mucus clearance  No nausea vomiting  No fevers or chills      ROS:  A comprehensive review of systems was negative except for: above    OBJECTIVE    Medications    Continuous Infusions:   dextrose      sodium chloride Stopped (01/12/23 1133)       Scheduled Meds:   guaiFENesin  600 mg Oral BID    acetylcysteine  600 mg Inhalation BID    albuterol  2.5 mg Nebulization 4x daily    lisinopril  10 mg Oral Daily    cefTRIAXone (ROCEPHIN) IV  2,000 mg IntraVENous Q24H    lactobacillus  1 capsule Oral BID WC    metroNIDAZOLE  500 mg Oral 3 times per day    aspirin  81 mg Oral Daily    enoxaparin  40 mg SubCUTAneous Daily    nicotine  1 patch TransDERmal Daily    insulin lispro  0-4 Units SubCUTAneous TID WC    insulin lispro  0-4 Units SubCUTAneous Nightly    amphetamine-dextroamphetamine  30 mg Oral BID AC    gabapentin  300 mg Oral TID       PRN Meds:  ipratropium-albuterol, glucose, dextrose bolus **OR** dextrose bolus, glucagon (rDNA), dextrose, sodium chloride flush, sodium chloride, potassium chloride **OR** potassium alternative oral replacement **OR** potassium chloride, magnesium sulfate, ondansetron **OR** ondansetron, acetaminophen **OR** acetaminophen, melatonin, calcium carbonate, HYDROcodone 5 mg - acetaminophen, ipratropium-albuterol, HYDROmorphone, albuterol, benzonatate    Physical    Patient Vitals for the past 24 hrs:   BP Temp Temp src Pulse Resp SpO2 Weight   01/13/23 0551 (!) 145/89 98.6 °F (37 °C) Oral (!) 102 17 90 % 154 lb 14.4 oz (70.3 kg)   01/13/23 0027 136/83 98.4 °F (36.9 °C) Oral 89 15 92 % --   01/12/23 2010 (!) 150/87 98.8 °F (37.1 °C) Oral (!) 102 17 90 % --   01/12/23 1915 -- -- -- (!) 101 18 92 % --   01/12/23 1736 -- -- -- -- 18 -- --   01/12/23 1551 (!) 142/87 98.4 °F (36.9 °C) Oral (!) 102 18 91 % --   01/12/23 1111 (!) 144/98 99.7 °F (37.6 °C) Oral 95 20 92 % --   01/12/23 1050 -- -- -- -- 20 -- --   01/12/23 0838 (!) 140/92 98.1 °F (36.7 °C) Oral (!) 113 18 93 % --          Physical Exam  Vitals and nursing note reviewed. Constitutional:       General: He is not in acute distress. Appearance: Normal appearance. HENT:      Head: Normocephalic and atraumatic. Right Ear: External ear normal.      Left Ear: External ear normal.      Nose: Nose normal.      Mouth/Throat:      Mouth: Mucous membranes are moist.      Pharynx: Oropharynx is clear. Eyes:      General:         Right eye: No discharge. Left eye: No discharge. Extraocular Movements: Extraocular movements intact. Conjunctiva/sclera: Conjunctivae normal.      Pupils: Pupils are equal, round, and reactive to light. Cardiovascular:      Rate and Rhythm: Normal rate and regular rhythm. Pulses: Normal pulses. Heart sounds: No murmur heard. Pulmonary:      Effort: No respiratory distress. Breath sounds: Rhonchi and rales present. Comments: Rhonchi and rales at the right lung base  Abdominal:      General: Bowel sounds are normal. There is no distension. Palpations: Abdomen is soft. There is no mass. Tenderness: There is no abdominal tenderness. Hernia: No hernia is present.    Musculoskeletal: General: No swelling. Normal range of motion. Cervical back: Normal range of motion. No rigidity. Skin:     General: Skin is warm and dry. Coloration: Skin is not jaundiced or pale. Neurological:      General: No focal deficit present. Mental Status: He is alert and oriented to person, place, and time. Mental status is at baseline. Cranial Nerves: No cranial nerve deficit. Sensory: No sensory deficit. Psychiatric:         Mood and Affect: Mood normal.         Behavior: Behavior normal.         Thought Content: Thought content normal.            Weight  Weight change: -3 lb 1.6 oz (-1.406 kg)      Labs:   Recent Labs     01/11/23  0644 01/12/23  0621 01/13/23  0648   WBC 16.8* 21.9* 22.9*   HGB 11.3* 11.0* 11.3*   HCT 33.9* 32.7* 33.5*   * 652* 764*        Recent Labs     01/11/23 0644 01/12/23  0621 01/13/23  0648   * 133* 130*   K 4.2 4.6 4.1    101 98*   CO2 27 25 25   BUN 20 16 14   GLUCOSE 89 140* 100*         Additional labs      Radiology, images personally reviewed. XR CHEST (2 VW) [[id number]] Collected: 01/10/23 0749      Order Status: Completed Specimen: Chest Updated: 01/10/23 0751     Narrative:       EXAMINATION:   TWO XRAY VIEWS OF THE CHEST     1/10/2023 7:38 am     COMPARISON:   January 7, 2023     HISTORY:   ORDERING SYSTEM PROVIDED HISTORY: pna, empyema   TECHNOLOGIST PROVIDED HISTORY:   Reason for exam:->pna, empyema     FINDINGS:   Large consolidation right lower lobe of the lung and moderate consolidation   left perihilar region. Left lung base infiltrate. Small right pleural   effusion. There is no pneumothorax. These findings are largely unchanged. Normal cardiac size. Moderate osteopenic changes and degenerative changes noted in the bony   structures. Impression:       Bilateral pneumonias without any improvement. Follow-up advised.       XR CHEST PORTABLE [[id number]] Collected: 01/12/23 1123      Order Status: Completed Updated: 01/12/23 1127     Narrative:       EXAMINATION:   ONE XRAY VIEW OF THE CHEST     1/12/2023 11:09 am     COMPARISON:   01/10/2023 radiograph     HISTORY:   ORDERING SYSTEM PROVIDED HISTORY: pna, empyema   TECHNOLOGIST PROVIDED HISTORY:   Reason for exam:->pna, empyema   Reason for Exam: pna, empyema     FINDINGS:   The heart and mediastinum are normal.  Mild perihilar opacities are stable on   the right greater than left. Dense airspace opacification persists in the   right mid and lower lung zone. Small pleural effusion. No skeletal finding. Impression:       Slightly improved aeration in the lungs from prior exam.  Dense airspace   changes persist in the right lower lung zone with a small pleural effusion. Assessment:     Principal Problem:    Pneumonia of right lower lobe due to infectious organism  Active Problems:    DM2 (diabetes mellitus, type 2) ([facility])    Smoker    ADHD    History of sleeve gastrectomy    Empyema (HCC)    Leukocytosis    Bandemia    Thrombocytosis    Normocytic normochromic anemia    Uncontrolled hypertension    Elevated BUN    Elevated procalcitonin    Pleuritic chest pain    Hemoptysis    Bacteremia due to Streptococcus  Resolved Problems:    * No resolved hospital problems.  *      Discussion /Plan:     Pleural effusion of the right chest  Status postthoracentesis, consistent with exudative pleural effusion    Initial CT scan was reviewed and did show some loculation to the pleural effusion on the right side  Repeat CT scan of the chest was reviewed with radiologist and there was not enough fluid to safely do a thoracentesis  I suspect that the pleural effusion is probably loculated self and most likely will resolve with continued antibiotics    With the increase in white count and chest discomfort  We will go ahead and repeat CT scan of the chest now            Pneumonia  Infectious diseases involved  Strep pneumonia 2 out of 2 blood cultures on upon admission. Continue with  Rocephin 2000 mg every 24 hours  Flagyl 500 mg every 8 hours    Mucinex therapy  Mucomyst with albuterol nebulized twice daily  Vest therapy       no fevers or chills  Procalcitonin is coming down  However white count is slowly increasing        Michel Runner, MD [facility]  [phone]      Please note that some or all of this record was generated using voice recognition software. If there are any questions about the content of this document, please contact the author as some errors in transcription may have occurred.

## 2023-01-13 NOTE — PROGRESS NOTES
Comprehensive Nutrition Assessment    Type and Reason for Visit:  Initial, RD Nutrition Re-Screen/LOS    Nutrition Recommendations/Plan:   Carb control diet regimen  Offer Glucerna with meals  Will monitor nutritional adequacy, nutrition-related labs, weights, BMs, and clinical progress      Malnutrition Assessment:  Malnutrition Status: At risk for malnutrition (Comment) (01/13/23 1327)        Nutrition Assessment:    LOS assessment. Patient admitted with pneumococcal pneumonia, bacteremia, empyema; ID following. Currently on a carb control diet regimen. Po intake 50-75% meals. Appetite decreased some per patient. RD offered any help in this area, patient agreeable to Glucerna shakes. No questions about diet regimen. Patient feels appetite will return has medical condition improves. Nutrition Related Findings:    BG less than 180 mg/dl; Na 130 mmol/L this am; Wound Type: None       Current Nutrition Intake & Therapies:    Average Meal Intake: 26-50%, 51-75%, %  Average Supplements Intake: Unable to assess  ADULT DIET; Regular; 4 carb choices (60 gm/meal)  ADULT ORAL NUTRITION SUPPLEMENT; Breakfast, Lunch, Dinner; Diabetic Oral Supplement    Anthropometric Measures:  Height: 5' 8\" (172.7 cm)  Ideal Body Weight (IBW): 154 lbs (70 kg)       Current Body Weight: 154 lb 14 oz (70.3 kg),   IBW. Weight Source: Standing Scale  Current BMI (kg/m2): 23.6                          BMI Categories: Normal Weight (BMI 18.5-24. 9)    Estimated Daily Nutrient Needs:  Energy Requirements Based On: Kcal/kg  Weight Used for Energy Requirements: Current  Energy (kcal/day): 7037-5928 (28-30 kcal/70.3 kg)  Weight Used for Protein Requirements: Current  Protein (g/day):  (1.2-1.5 g/70.3 kg)  Method Used for Fluid Requirements: 1 ml/kcal  Fluid (ml/day):      Nutrition Diagnosis:   Increased nutrient needs related to increase demand for energy/nutrients as evidenced by  (increased protein needs due to compromised lung function and extended hospital stay)    Nutrition Interventions:   Food and/or Nutrient Delivery: Continue Current Diet  Nutrition Education/Counseling: Education declined  Coordination of Nutrition Care: Continue to monitor while inpatient       Goals:     Goals: PO intake 75% or greater       Nutrition Monitoring and Evaluation:      Food/Nutrient Intake Outcomes: Food and Nutrient Intake, Supplement Intake  Physical Signs/Symptoms Outcomes: Biochemical Data, Nutrition Focused Physical Findings    Discharge Planning:     Too soon to determine     BRANDIN, [address] [facility], [address] [place] [address],   Contact: [phone]

## 2023-01-13 NOTE — PLAN OF CARE
Problem: Discharge Planning  Goal: Discharge to home or other facility with appropriate resources  Outcome: Progressing     Problem: Safety - Adult  Goal: Free from fall injury  1/12/2023 2127 by Marily Wells RN  Outcome: Progressing  Flowsheets (Taken 1/12/2023 2000)  Free From Fall Injury: Based on caregiver fall risk screen, instruct family/caregiver to ask for assistance with transferring infant if caregiver noted to have fall risk factors  Problem: Pain  Goal: Verbalizes/displays adequate comfort level or baseline comfort level  1/12/2023 2127 by Marily Wells RN  Outcome: Progressing  Problem: Nutrition Deficit:  Goal: Optimize nutritional status  Outcome: Progressing     Problem: Chronic Conditions and Co-morbidities  Goal: Patient's chronic conditions and co-morbidity symptoms are monitored and maintained or improved  1/12/2023 2127 by Marily Wells RN  Outcome: Progressing  Problem: Metabolic/Fluid and Electrolytes - Adult  Goal: Glucose maintained within prescribed range  Outcome: Progressing

## 2023-01-14 LAB
ANION GAP SERPL CALCULATED.3IONS-SCNC: 6 MMOL/L (ref 3–16)
BASOPHILS ABSOLUTE: 0 K/UL (ref 0–0.2)
BASOPHILS RELATIVE PERCENT: 0 %
BETA GLOBULIN: 0 AU/ML (ref 0–19)
BLOOD CULTURE, ROUTINE: NORMAL
BUN BLDV-MCNC: 13 MG/DL (ref 7–20)
CALCIUM SERPL-MCNC: 7.4 MG/DL (ref 8.3–10.6)
CHLORIDE BLD-SCNC: 97 MMOL/L (ref 99–110)
CO2: 26 MMOL/L (ref 21–32)
CREAT SERPL-MCNC: <0.5 MG/DL (ref 0.8–1.3)
CULTURE, BLOOD 2: NORMAL
EOSINOPHILS ABSOLUTE: 0.8 K/UL (ref 0–0.6)
EOSINOPHILS RELATIVE PERCENT: 5 %
GFR SERPL CREATININE-BSD FRML MDRD: >60 ML/MIN/{1.73_M2}
GLUCOSE BLD-MCNC: 119 MG/DL (ref 70–99)
GLUCOSE BLD-MCNC: 139 MG/DL (ref 70–99)
GLUCOSE BLD-MCNC: 88 MG/DL (ref 70–99)
GLUCOSE BLD-MCNC: 89 MG/DL (ref 70–99)
GLUCOSE BLD-MCNC: 91 MG/DL (ref 70–99)
HCT VFR BLD CALC: 31.5 % (ref 40.5–52.5)
HEMATOLOGY PATH CONSULT: YES
HEMOGLOBIN: 10.3 G/DL (ref 13.5–17.5)
LYMPHOCYTES ABSOLUTE: 1.9 K/UL (ref 1–5.1)
LYMPHOCYTES RELATIVE PERCENT: 11 %
MCH RBC QN AUTO: 29.6 PG (ref 26–34)
MCHC RBC AUTO-ENTMCNC: 32.8 G/DL (ref 31–36)
MCV RBC AUTO: 90.2 FL (ref 80–100)
MONOCYTES ABSOLUTE: 2.4 K/UL (ref 0–1.3)
MONOCYTES RELATIVE PERCENT: 14 %
NEUTROPHILS ABSOLUTE: 11.8 K/UL (ref 1.7–7.7)
NEUTROPHILS RELATIVE PERCENT: 70 %
PDW BLD-RTO: 14.1 % (ref 12.4–15.4)
PERFORMED ON: ABNORMAL
PERFORMED ON: ABNORMAL
PERFORMED ON: NORMAL
PERFORMED ON: NORMAL
PLATELET # BLD: 884 K/UL (ref 135–450)
PLATELET SLIDE REVIEW: ABNORMAL
PMV BLD AUTO: 7.5 FL (ref 5–10.5)
POTASSIUM SERPL-SCNC: 4.8 MMOL/L (ref 3.5–5.1)
RBC # BLD: 3.5 M/UL (ref 4.2–5.9)
SLIDE REVIEW: ABNORMAL
SODIUM BLD-SCNC: 129 MMOL/L (ref 136–145)
WBC # BLD: 16.9 K/UL (ref 4–11)

## 2023-01-14 PROCEDURE — 6370000000 HC RX 637 (ALT 250 FOR IP): Performed by: INTERNAL MEDICINE

## 2023-01-14 PROCEDURE — 1200000000 HC SEMI PRIVATE

## 2023-01-14 PROCEDURE — 94640 AIRWAY INHALATION TREATMENT: CPT

## 2023-01-14 PROCEDURE — 94669 MECHANICAL CHEST WALL OSCILL: CPT

## 2023-01-14 PROCEDURE — 85025 COMPLETE CBC W/AUTO DIFF WBC: CPT

## 2023-01-14 PROCEDURE — 6360000002 HC RX W HCPCS: Performed by: INTERNAL MEDICINE

## 2023-01-14 PROCEDURE — 36415 COLL VENOUS BLD VENIPUNCTURE: CPT

## 2023-01-14 PROCEDURE — 99232 SBSQ HOSP IP/OBS MODERATE 35: CPT | Performed by: INTERNAL MEDICINE

## 2023-01-14 PROCEDURE — 2580000003 HC RX 258: Performed by: INTERNAL MEDICINE

## 2023-01-14 PROCEDURE — 80048 BASIC METABOLIC PNL TOTAL CA: CPT

## 2023-01-14 PROCEDURE — 6370000000 HC RX 637 (ALT 250 FOR IP): Performed by: NURSE PRACTITIONER

## 2023-01-14 RX ORDER — ACETYLCYSTEINE 200 MG/ML
600 SOLUTION ORAL; RESPIRATORY (INHALATION) EVERY 8 HOURS PRN
Status: DISCONTINUED | OUTPATIENT
Start: 2023-01-14 | End: 2023-01-18 | Stop reason: HOSPADM

## 2023-01-14 RX ADMIN — GUAIFENESIN 600 MG: 600 TABLET, EXTENDED RELEASE ORAL at 20:08

## 2023-01-14 RX ADMIN — LISINOPRIL 10 MG: 10 TABLET ORAL at 08:47

## 2023-01-14 RX ADMIN — ALBUTEROL SULFATE 2.5 MG: 2.5 SOLUTION RESPIRATORY (INHALATION) at 11:17

## 2023-01-14 RX ADMIN — Medication 10 ML: at 20:08

## 2023-01-14 RX ADMIN — ALBUTEROL SULFATE 2.5 MG: 2.5 SOLUTION RESPIRATORY (INHALATION) at 19:47

## 2023-01-14 RX ADMIN — Medication 1 CAPSULE: at 08:47

## 2023-01-14 RX ADMIN — GABAPENTIN 300 MG: 300 CAPSULE ORAL at 14:26

## 2023-01-14 RX ADMIN — Medication 1 CAPSULE: at 17:23

## 2023-01-14 RX ADMIN — METRONIDAZOLE 500 MG: 250 TABLET ORAL at 04:12

## 2023-01-14 RX ADMIN — HYDROCODONE BITARTRATE AND ACETAMINOPHEN 1 TABLET: 5; 325 TABLET ORAL at 11:09

## 2023-01-14 RX ADMIN — HYDROMORPHONE HYDROCHLORIDE 1 MG: 1 INJECTION, SOLUTION INTRAMUSCULAR; INTRAVENOUS; SUBCUTANEOUS at 22:04

## 2023-01-14 RX ADMIN — DEXTROAMPHETAMINE SACCHARATE, AMPHETAMINE ASPARTATE, DEXTROAMPHETAMINE SULFATE, AMPHETAMINE SULFATE TABLETS, 10 MG,CLL 30 MG: 2.5; 2.5; 2.5; 2.5 TABLET ORAL at 15:36

## 2023-01-14 RX ADMIN — GUAIFENESIN 600 MG: 600 TABLET, EXTENDED RELEASE ORAL at 08:47

## 2023-01-14 RX ADMIN — ENOXAPARIN SODIUM 40 MG: 100 INJECTION SUBCUTANEOUS at 08:47

## 2023-01-14 RX ADMIN — HYDROMORPHONE HYDROCHLORIDE 1 MG: 1 INJECTION, SOLUTION INTRAMUSCULAR; INTRAVENOUS; SUBCUTANEOUS at 18:43

## 2023-01-14 RX ADMIN — METRONIDAZOLE 500 MG: 250 TABLET ORAL at 20:08

## 2023-01-14 RX ADMIN — DEXTROAMPHETAMINE SACCHARATE, AMPHETAMINE ASPARTATE, DEXTROAMPHETAMINE SULFATE, AMPHETAMINE SULFATE TABLETS, 10 MG,CLL 30 MG: 2.5; 2.5; 2.5; 2.5 TABLET ORAL at 06:39

## 2023-01-14 RX ADMIN — HYDROMORPHONE HYDROCHLORIDE 1 MG: 1 INJECTION, SOLUTION INTRAMUSCULAR; INTRAVENOUS; SUBCUTANEOUS at 13:09

## 2023-01-14 RX ADMIN — HYDROMORPHONE HYDROCHLORIDE 1 MG: 1 INJECTION, SOLUTION INTRAMUSCULAR; INTRAVENOUS; SUBCUTANEOUS at 00:55

## 2023-01-14 RX ADMIN — HYDROMORPHONE HYDROCHLORIDE 1 MG: 1 INJECTION, SOLUTION INTRAMUSCULAR; INTRAVENOUS; SUBCUTANEOUS at 05:03

## 2023-01-14 RX ADMIN — ALBUTEROL SULFATE 2.5 MG: 2.5 SOLUTION RESPIRATORY (INHALATION) at 07:37

## 2023-01-14 RX ADMIN — CEFTRIAXONE 2000 MG: 2 INJECTION, POWDER, FOR SOLUTION INTRAMUSCULAR; INTRAVENOUS at 10:27

## 2023-01-14 RX ADMIN — Medication 10 ML: at 08:16

## 2023-01-14 RX ADMIN — GABAPENTIN 300 MG: 300 CAPSULE ORAL at 08:47

## 2023-01-14 RX ADMIN — ASPIRIN 81 MG: 81 TABLET, COATED ORAL at 08:47

## 2023-01-14 RX ADMIN — HYDROCODONE BITARTRATE AND ACETAMINOPHEN 1 TABLET: 5; 325 TABLET ORAL at 17:24

## 2023-01-14 RX ADMIN — ALBUTEROL SULFATE 2.5 MG: 2.5 SOLUTION RESPIRATORY (INHALATION) at 15:16

## 2023-01-14 RX ADMIN — METRONIDAZOLE 500 MG: 250 TABLET ORAL at 12:25

## 2023-01-14 RX ADMIN — GABAPENTIN 300 MG: 300 CAPSULE ORAL at 20:08

## 2023-01-14 RX ADMIN — HYDROMORPHONE HYDROCHLORIDE 1 MG: 1 INJECTION, SOLUTION INTRAMUSCULAR; INTRAVENOUS; SUBCUTANEOUS at 08:14

## 2023-01-14 RX ADMIN — HYDROCODONE BITARTRATE AND ACETAMINOPHEN 1 TABLET: 5; 325 TABLET ORAL at 04:11

## 2023-01-14 RX ADMIN — BENZONATATE 200 MG: 100 CAPSULE ORAL at 20:08

## 2023-01-14 ASSESSMENT — PAIN DESCRIPTION - ORIENTATION
ORIENTATION: RIGHT
ORIENTATION: RIGHT;LEFT
ORIENTATION: RIGHT
ORIENTATION: RIGHT
ORIENTATION: RIGHT;LEFT
ORIENTATION: RIGHT
ORIENTATION: RIGHT
ORIENTATION: RIGHT;LEFT
ORIENTATION: RIGHT;LEFT

## 2023-01-14 ASSESSMENT — PAIN DESCRIPTION - DESCRIPTORS
DESCRIPTORS: DISCOMFORT
DESCRIPTORS: SHOOTING
DESCRIPTORS: STABBING
DESCRIPTORS: SHOOTING;SHARP
DESCRIPTORS: SHARP

## 2023-01-14 ASSESSMENT — PAIN DESCRIPTION - PAIN TYPE
TYPE: ACUTE PAIN
TYPE: ACUTE PAIN

## 2023-01-14 ASSESSMENT — PAIN SCALES - GENERAL
PAINLEVEL_OUTOF10: 8
PAINLEVEL_OUTOF10: 6
PAINLEVEL_OUTOF10: 7

## 2023-01-14 ASSESSMENT — PAIN DESCRIPTION - LOCATION
LOCATION: CHEST;RIB CAGE
LOCATION: CHEST
LOCATION: CHEST;RIB CAGE
LOCATION: CHEST;RIB CAGE
LOCATION: CHEST;SHOULDER
LOCATION: CHEST;SHOULDER
LOCATION: CHEST
LOCATION: CHEST;SHOULDER
LOCATION: CHEST;RIB CAGE
LOCATION: CHEST;RIB CAGE
LOCATION: CHEST

## 2023-01-14 NOTE — PROGRESS NOTES
Hospitalist Progress Note      PCP: Geralyn Gottron, MD    Date of Admission: 1/7/2023    Chief Complaint: Fatigue    Hospital Course:   Shanna Bingham is a 65 y.o. male. He presented to the eR from home. He c/o feeling ill for about a week. He describes feeling constitutionally ill w/ fatigue, malaise, loss of appetite. Along the way he has had a worsening cough, worsening dyspnea, and pretty marked right-sided pleuritic chest pain along the lateral and anterolater right costal margin. CXR with mild central pulmonary congestion. Small right pleural effusion. Thoracentesis with 300 ml fluid removed. Subjective:  Continued congestion and pain in right lung base.  Able to have a more productive cough after use of vest.       Medications:  Reviewed    Infusion Medications    dextrose      sodium chloride 15 mL/hr at 01/13/23 1022     Scheduled Medications    guaiFENesin  600 mg Oral BID    albuterol  2.5 mg Nebulization 4x daily    lisinopril  10 mg Oral Daily    cefTRIAXone (ROCEPHIN) IV  2,000 mg IntraVENous Q24H    lactobacillus  1 capsule Oral BID WC    metroNIDAZOLE  500 mg Oral 3 times per day    aspirin  81 mg Oral Daily    enoxaparin  40 mg SubCUTAneous Daily    nicotine  1 patch TransDERmal Daily    insulin lispro  0-4 Units SubCUTAneous TID WC    insulin lispro  0-4 Units SubCUTAneous Nightly    amphetamine-dextroamphetamine  30 mg Oral BID AC    gabapentin  300 mg Oral TID     PRN Meds: acetylcysteine, glucose, dextrose bolus **OR** dextrose bolus, glucagon (rDNA), dextrose, sodium chloride flush, sodium chloride, potassium chloride **OR** potassium alternative oral replacement **OR** potassium chloride, magnesium sulfate, ondansetron **OR** ondansetron, acetaminophen **OR** acetaminophen, melatonin, calcium carbonate, HYDROcodone 5 mg - acetaminophen, ipratropium-albuterol, HYDROmorphone, albuterol, benzonatate      Intake/Output Summary (Last 24 hours) at 1/14/2023 0659  Last data filed at 1/14/2023 0313  Gross per 24 hour   Intake 480 ml   Output 600 ml   Net -120 ml         Physical Exam Performed:    /81   Pulse 86   Temp 98.2 °F (36.8 °C) (Oral)   Resp 20   Ht 5' 8\" (1.727 m)   Wt 152 lb 12.8 oz (69.3 kg)   SpO2 92%   BMI 23.23 kg/m²     General appearance: No apparent distress, appears stated age and cooperative. HEENT: Pupils equal, round, and reactive to light. Conjunctivae/corneas clear. Neck: Supple, with full range of motion. No jugular venous distention. Trachea midline. Respiratory:  Normal respiratory effort. Crackles in right lung base with some improvement   Cardiovascular: Regular rate and rhythm with normal S1/S2 without murmurs, rubs or gallops. Abdomen: Soft, non-tender, non-distended with normal bowel sounds. Musculoskeletal: No clubbing, cyanosis or edema bilaterally. Full range of motion without deformity. Skin: Skin color, texture, turgor normal.  No rashes or lesions. Neurologic:  Neurovascularly intact without any focal sensory/motor deficits. Cranial nerves: II-XII intact, grossly non-focal.  Psychiatric: Alert and oriented, thought content appropriate, normal insight  Capillary Refill: Brisk, 3 seconds, normal   Peripheral Pulses: +2 palpable, equal bilaterally       Labs:   Recent Labs     01/12/23  0621 01/13/23  0648 01/14/23 0614   WBC 21.9* 22.9* 16.9*   HGB 11.0* 11.3* 10.3*   HCT 32.7* 33.5* 31.5*   * 764* 884*       Recent Labs     01/12/23  0621 01/13/23 0648 01/14/23  0614   * 130* 129*   K 4.6 4.1 4.8    98* 97*   CO2 25 25 26   BUN 16 14 13   CREATININE <0.5* <0.5* <0.5*   CALCIUM 7.4* 7.5* 7.4*       No results for input(s): AST, ALT, BILIDIR, BILITOT, ALKPHOS in the last 72 hours. No results for input(s): INR in the last 72 hours. No results for input(s): Stan Altes in the last 72 hours.       Urinalysis:      Lab Results   Component Value Date/Time    NITRU Negative 07/23/2021 04:28 PM    WBCUA 0-2 07/23/2021 04:28 PM    RBCUA 3-4 07/23/2021 04:28 PM    BLOODU Negative 07/23/2021 04:28 PM    SPECGRAV >=1.030 07/23/2021 04:28 PM    GLUCOSEU Negative 07/23/2021 04:28 PM       Radiology:  CT CHEST WO CONTRAST   Preliminary Result   Similar small to moderate right and small left pleural effusions. Again seen are bilateral airspace opacities, right greater than left,   compatible with infection. Similar appearance of a 1.4 cm left lower lobe pulmonary nodule. Similar mild mediastinal lymphadenopathy. Similar ectasia of the ascending thoracic aorta and mild dilation of the main   pulmonary artery. RECOMMENDATIONS:   Recommend a short-term follow-up chest CT after resolution of acute symptoms,   to further assess the indeterminate left lower lobe pulmonary nodule. XR CHEST PORTABLE   Final Result   Relatively stable pattern of pneumonia in the right lung. XR CHEST PORTABLE   Final Result   Slightly improved aeration in the lungs from prior exam.  Dense airspace   changes persist in the right lower lung zone with a small pleural effusion. CT CHEST WO CONTRAST   Final Result   Moderate bilateral pleural effusions, slightly increased as compared to   prior. Partial atelectasis of the posterior left lower lobe. Multifocal bilateral airspace disease, improving within the lingula and right   lower lobe though increasing within the right upper lobe as compared to   prior. Increased size of an AP window lymph node, likely reactive. Filling defects within the bronchus intermedius and left lower lobe bronchi,   which can be due to mucoid impaction or aspiration. Indeterminate 1.4 cm left lower lobe pulmonary nodule again noted, for which   short-term follow-up chest CT after treatment and resolution of any acute   symptoms is recommended to ensure resolution. If this does not resolve, it   is of a size amenable to evaluation by PET-CT.          XR CHEST (2 VW) Final Result   Bilateral pneumonias without any improvement. Follow-up advised. CT CHEST W CONTRAST   Final Result   Multifocal pneumonia,right greater than left with tiny pleural effusions. A more focal nodular density in the left lower lobe is indeterminate   measuring 1.2 cm in size. Recommend short-term follow-up chest CT after   treatment approximately 4-6 weeks time to assess for change. Small mediastinal nodes are seen which may simply be reactive. RECOMMENDATIONS:   *pulmonary management*         XR CHEST PORTABLE   Final Result   No pneumothorax identified. Redemonstration of bilateral airspace disease,   right greater than left. XR CHEST PORTABLE   Final Result   Mild central pulmonary congestion      Moderate airspace opacity right lung base and hazy opacities along the left   upper lobe and left lung base which could represent multisegmental   bronchopneumonia. Recommend short-term follow-up      Small questionable small right pleural effusion. 1.6 cm nodule left lung base laterally which is more apparent could represent   a small neoplasm.   Recommend CT correlation             IP CONSULT TO HOSPITALIST  IP CONSULT TO INFECTIOUS DISEASES  IP CONSULT TO PULMONOLOGY    Assessment/Plan:    Active Hospital Problems    Diagnosis     Bandemia [D72.825]      Priority: Medium    Thrombocytosis [D75.839]      Priority: Medium    Normocytic normochromic anemia [D64.9]      Priority: Medium    Uncontrolled hypertension [I10]      Priority: Medium    Elevated BUN [R79.9]      Priority: Medium    Elevated procalcitonin [R79.89]      Priority: Medium    Pleuritic chest pain [R07.81]      Priority: Medium    Hemoptysis [R04.2]      Priority: Medium    Bacteremia due to Streptococcus [R78.81, B95.5]      Priority: Medium    Empyema (HCC) [J86.9]      Priority: Medium    Leukocytosis [D72.829]      Priority: Medium    DM2 (diabetes mellitus, type 2) ([facility] [address] [E11.9]      Priority: Medium    Smoker [F17.200]      Priority: Medium    ADHD [F90.9]      Priority: Medium    History of sleeve gastrectomy [Z90.3]      Priority: Medium    Pneumonia of right lower lobe due to infectious organism [J18.9]      Priority: Medium     Community-acquired pneumonia, pneumococcal, Pneumonia complicated by parapneumonic effusion. Continue IV Rocephin DC azithromycin. Flagyl added since empyema is sometimes polymicrobial.  Continue supplemental oxygen. As needed pain medication for related pain. Pulm consulted. Vest therapy     Strep pneumoniae bacteremia - Ceftriaxone. ID consulted. Likely 2-3 weeks total of abx. Repeat Blood cultures NGTD. WBC count elevated    Sepsis - w/ Leukocytosis/Tachycardia POArrival 2nd to above infection. Continue IVF as appropriate and monitor clinical response w/ ABX as written. Acute hypoxic respiratory failure: Secondary to severe pneumonia. Continue supplemental oxygen and wean off as able. Pulmonary nodule: 1.6 cm nodule left lung base, concerning for small neoplasm on chest x-ray. We will get CT chest to further evaluate. HTN - w/out known CAD and no evidence of active signs/sxs of ischemia/failure. Currently controlled on home meds w/ vitals reviewed and documented as above. DM type II: Continue subcu insulin regimen including long-acting and short acting insulin as ordered, continue SSI protocol for more adequate glycemic control. ADHD: Continue outpatient Adderall    Chronic back pain - continue gabapentin    DVT Prophylaxis: Lovenox  Diet: ADULT DIET;  Regular; 4 carb choices (60 gm/meal)  ADULT ORAL NUTRITION SUPPLEMENT; Breakfast, Lunch, Dinner; Diabetic Oral Supplement  Code Status: Full Code  PT/OT Eval Status: Not indicated    Dispo - Pending clinical improvement, recs from pulm and ID     Appropriate for A1 Discharge Unit: Keren Sequeira DO

## 2023-01-14 NOTE — PROGRESS NOTES
ID Follow-up NOTE    CC:   Pneumococcal pneumonia, empyema, bacteremia  Antibiotics: Ceftriaxone, Metronidazole    Admit Date: 1/7/2023  Hospital Day: 8    Subjective:     Patient feel good, still with R lower chest pain with deep inspiration / cough      Objective:     Patient Vitals for the past 8 hrs:   BP Temp Temp src Pulse Resp SpO2   01/14/23 1535 131/76 98.6 °F (37 °C) Oral 84 18 93 %   01/14/23 1518 -- -- -- -- -- 93 %   01/14/23 1147 132/78 98.2 °F (36.8 °C) Oral 87 18 92 %   01/14/23 1119 -- -- -- -- -- 90 %     I/O last 3 completed shifts: In: 8966 [P.O.:1230]  Out: 1100 [Urine:1100]  I/O this shift:  In: 290 [P.O.:240; IV Piggyback:50]  Out: -     EXAM:  GENERAL: No apparent distress.     HEENT: Membranes moist, no oral lesion  NECK:  Supple, no lymphadenopathy  LUNGS: R base rales  CARDIAC: RRR, no murmur appreciated  ABD:  + BS, soft / NT  EXT:  No rash, no edema, no lesions  NEURO: No focal neurologic findings  PSYCH: Orientation, sensorium, mood normal  LINES:  Peripheral iv       Data Review:  Lab Results   Component Value Date    WBC 16.9 (H) 01/14/2023    HGB 10.3 (L) 01/14/2023    HCT 31.5 (L) 01/14/2023    MCV 90.2 01/14/2023     (H) 01/14/2023     Lab Results   Component Value Date    CREATININE <0.5 (L) 01/14/2023    BUN 13 01/14/2023     (L) 01/14/2023    K 4.8 01/14/2023    CL 97 (L) 01/14/2023    CO2 26 01/14/2023       Hepatic Function Panel:   Lab Results   Component Value Date/Time    ALKPHOS 116 07/23/2021 05:15 PM    ALT 23 07/23/2021 05:15 PM    AST 18 07/23/2021 05:15 PM    PROT 7.2 07/23/2021 05:15 PM    BILITOT 0.4 07/23/2021 05:15 PM    LABALBU 4.2 07/23/2021 05:15 PM       MICRO:  1/7       BC x2 S pneumoniae               Pleural fluid culture S pneumoniae (PCN NASIMA < 0.06)  1/8       Legionella and pneumococcal ag neg   Sputum culture NRF              MRSA nasal screen neg   1/10     BC x2 no growth to date       IMAGING:  CT chest 1/11/23  Impression   Moderate bilateral pleural effusions, slightly increased as compared to   prior. Partial atelectasis of the posterior left lower lobe. Multifocal bilateral airspace disease, improving within the lingula and right   lower lobe though increasing within the right upper lobe as compared to   prior. Increased size of an AP window lymph node, likely reactive. Filling defects within the bronchus intermedius and left lower lobe bronchi,   which can be due to mucoid impaction or aspiration. Indeterminate 1.4 cm left lower lobe pulmonary nodule again noted, for which   short-term follow-up chest CT after treatment and resolution of any acute   symptoms is recommended to ensure resolution. If this does not resolve, it   is of a size amenable to evaluation by PET-CT.        Scheduled Meds:   guaiFENesin  600 mg Oral BID    albuterol  2.5 mg Nebulization 4x daily    lisinopril  10 mg Oral Daily    cefTRIAXone (ROCEPHIN) IV  2,000 mg IntraVENous Q24H    lactobacillus  1 capsule Oral BID WC    metroNIDAZOLE  500 mg Oral 3 times per day    aspirin  81 mg Oral Daily    enoxaparin  40 mg SubCUTAneous Daily    nicotine  1 patch TransDERmal Daily    insulin lispro  0-4 Units SubCUTAneous TID WC    insulin lispro  0-4 Units SubCUTAneous Nightly    amphetamine-dextroamphetamine  30 mg Oral BID AC    gabapentin  300 mg Oral TID       Continuous Infusions:   dextrose      sodium chloride 15 mL/hr at 01/13/23 1022       PRN Meds:  acetylcysteine, glucose, dextrose bolus **OR** dextrose bolus, glucagon (rDNA), dextrose, sodium chloride flush, sodium chloride, potassium chloride **OR** potassium alternative oral replacement **OR** potassium chloride, magnesium sulfate, ondansetron **OR** ondansetron, acetaminophen **OR** acetaminophen, melatonin, calcium carbonate, HYDROcodone 5 mg - acetaminophen, ipratropium-albuterol, HYDROmorphone, albuterol, benzonatate      Assessment:     HTN, cigarette use    Multifocal pneumonia  LLL nodule  Pneumococcal empyema  Pneumococcal bacteremia  Leukocytosis, WBC down today      Plan:     Cont Ceftriaxone + Metronidazole (oral)    Home on po Levofloxacin 750 mg po daily x 2 weeks further     Medical Decision Making:   The following items were considered in medical decision making:  Discussion of patient care with other providers  Reviewed clinical lab tests  Reviewed radiology tests  Reviewed other diagnostic tests/interventions  Microbiology cultures and other micro tests reviewed      Discussed with pt  Deisy Mendosa MD

## 2023-01-14 NOTE — PROGRESS NOTES
INPATIENT PULMONARY CRITICAL CARE PROGRESS NOTE      Reason for visit    Multifocal pneumonia with empyema    SUBJECTIVE: Patient when seen this morning continues to have some chest congestion, but patient states that he is able to bring up some phlegm which is dark in color with some hemoptysis off-and-on, patient also states that he underwent thoracentesis on this admission, patient has less pleuritic chest pain, patient was afebrile and hemodynamically maintained, patient's glycemic control was acceptable, patient was on room air oxygen with saturation of 92% when seen, patient was alert and oriented, no other pertinent review of system of concern         Physical Exam:  Blood pressure 132/81, pulse 86, temperature 98.2 °F (36.8 °C), temperature source Oral, resp. rate 20, height 5' 8\" (1.727 m), weight 152 lb 12.8 oz (69.3 kg), SpO2 92 %.'     Constitutional: No respiratory distress, audible chest congestion  HENT:  Oropharynx is clear and moist. No thyromegaly. Eyes:  Conjunctivae are normal. Pupils equal, round, and reactive to light. No scleral icterus. Neck: . No tracheal deviation present. No obvious thyroid mass. Cardiovascular: Normal rate, regular rhythm, normal heart sounds. No right ventricular heave. No lower extremity edema. Pulmonary/Chest: No wheezes. Bilateral coarse rales(Rt>Lt). Chest wall is not dull to percussion. No accessory muscle usage or stridor. Decreased breath sound intensity  Abdominal: Soft. Bowel sounds present. No distension or hernia. No tenderness. Musculoskeletal: No cyanosis. No clubbing. No obvious joint deformity. Lymphadenopathy: No cervical or supraclavicular adenopathy. Skin: Skin is warm and dry. No rash or nodules on the exposed extremities. Psychiatric: Normal mood and affect. Behavior is normal.  No anxiety. Neurologic: Alert, awake and oriented. PERRL.   Speech fluent    Results:  CBC:   Recent Labs     01/12/23  0621 01/13/23  0648 01/14/23  7375 WBC 21.9* 22.9* 16.9*   HGB 11.0* 11.3* 10.3*   HCT 32.7* 33.5* 31.5*   MCV 90.7 90.2 90.2   * 764* 884*     BMP:   Recent Labs     01/12/23  0621 01/13/23  0648 01/14/23  0614   * 130* 129*   K 4.6 4.1 4.8    98* 97*   CO2 25 25 26   BUN 16 14 13   CREATININE <0.5* <0.5* <0.5*       Imaging:  I have reviewed radiology images personally. CT CHEST WO CONTRAST   Preliminary Result   Similar small to moderate right and small left pleural effusions. Again seen are bilateral airspace opacities, right greater than left,   compatible with infection. Similar appearance of a 1.4 cm left lower lobe pulmonary nodule. Similar mild mediastinal lymphadenopathy. Similar ectasia of the ascending thoracic aorta and mild dilation of the main   pulmonary artery. RECOMMENDATIONS:   Recommend a short-term follow-up chest CT after resolution of acute symptoms,   to further assess the indeterminate left lower lobe pulmonary nodule. XR CHEST PORTABLE   Final Result   Relatively stable pattern of pneumonia in the right lung. XR CHEST PORTABLE   Final Result   Slightly improved aeration in the lungs from prior exam.  Dense airspace   changes persist in the right lower lung zone with a small pleural effusion. CT CHEST WO CONTRAST   Final Result   Moderate bilateral pleural effusions, slightly increased as compared to   prior. Partial atelectasis of the posterior left lower lobe. Multifocal bilateral airspace disease, improving within the lingula and right   lower lobe though increasing within the right upper lobe as compared to   prior. Increased size of an AP window lymph node, likely reactive. Filling defects within the bronchus intermedius and left lower lobe bronchi,   which can be due to mucoid impaction or aspiration.       Indeterminate 1.4 cm left lower lobe pulmonary nodule again noted, for which   short-term follow-up chest CT after treatment and resolution of any acute   symptoms is recommended to ensure resolution. If this does not resolve, it   is of a size amenable to evaluation by PET-CT. XR CHEST (2 VW)   Final Result   Bilateral pneumonias without any improvement. Follow-up advised. CT CHEST W CONTRAST   Final Result   Multifocal pneumonia,right greater than left with tiny pleural effusions. A more focal nodular density in the left lower lobe is indeterminate   measuring 1.2 cm in size. Recommend short-term follow-up chest CT after   treatment approximately 4-6 weeks time to assess for change. Small mediastinal nodes are seen which may simply be reactive. RECOMMENDATIONS:   *pulmonary management*         XR CHEST PORTABLE   Final Result   No pneumothorax identified. Redemonstration of bilateral airspace disease,   right greater than left. XR CHEST PORTABLE   Final Result   Mild central pulmonary congestion      Moderate airspace opacity right lung base and hazy opacities along the left   upper lobe and left lung base which could represent multisegmental   bronchopneumonia. Recommend short-term follow-up      Small questionable small right pleural effusion. 1.6 cm nodule left lung base laterally which is more apparent could represent   a small neoplasm. Recommend CT correlation           CT CHEST WO CONTRAST    Result Date: 1/13/2023  EXAMINATION: CT OF THE CHEST WITHOUT CONTRAST 1/13/2023 3:05 pm TECHNIQUE: CT of the chest was performed without the administration of intravenous contrast. Multiplanar reformatted images are provided for review. Automated exposure control, iterative reconstruction, and/or weight based adjustment of the mA/kV was utilized to reduce the radiation dose to as low as reasonably achievable.  COMPARISON: Chest radiograph 1/13/2023, chest CT 1/11/2023 HISTORY: ORDERING SYSTEM PROVIDED HISTORY: Extension of pleural effusion TECHNOLOGIST PROVIDED HISTORY: Reason for exam:->Extension of pleural effusion Reason for Exam: CP, SOB. coughing up a small amount of blood,. Relevant Medical/Surgical History: c-diff, HTN. .. FINDINGS: Mediastinum: Similar mildly enlarged mediastinal lymph nodes. No definite new lymphadenopathy is seen. Small volume pericardial fluid. Ectatic ascending thoracic aorta, measuring 4.3 cm in diameter. Dilated main pulmonary artery, measuring 3.7 cm in diameter. Lungs/pleura: Previously seen intraluminal contents within the right lower lobe bronchus are less conspicuous on the current exam.  Similar appearance of a small to moderate-sized right pleural effusion. Similar appearance of a small left pleural effusion. No pneumothorax is seen. Again seen are extensive airspace opacities in the lungs, right greater than left, compatible with infection. Similar 1.4 cm solid left lower lobe pulmonary nodule. Upper Abdomen: Limited images of the upper abdomen demonstrate no obvious acute abnormality. Small hiatal hernia. Soft Tissues/Bones: No acute bony abnormality is seen. Similar small to moderate right and small left pleural effusions. Again seen are bilateral airspace opacities, right greater than left, compatible with infection. Similar appearance of a 1.4 cm left lower lobe pulmonary nodule. Similar mild mediastinal lymphadenopathy. Similar ectasia of the ascending thoracic aorta and mild dilation of the main pulmonary artery. RECOMMENDATIONS: Recommend a short-term follow-up chest CT after resolution of acute symptoms, to further assess the indeterminate left lower lobe pulmonary nodule. CT CHEST WO CONTRAST    Result Date: 1/11/2023  EXAMINATION: CT OF THE CHEST WITHOUT CONTRAST 1/11/2023 10:13 am TECHNIQUE: CT of the chest was performed without the administration of intravenous contrast. Multiplanar reformatted images are provided for review.  Automated exposure control, iterative reconstruction, and/or weight based adjustment of the mA/kV was utilized to reduce the radiation dose to as low as reasonably achievable. COMPARISON: 01/08/2023 HISTORY: ORDERING SYSTEM PROVIDED HISTORY: Pleural effusion, recurrent, pneumonia TECHNOLOGIST PROVIDED HISTORY: Reason for exam:->Pleural effusion, recurrent, pneumonia Reason for Exam: recurrent pneumonia, sob Relevant Medical/Surgical History: smoker x 40 yr FINDINGS: Mediastinum: Calcification of the thoracic aorta. Calcified mediastinal and hilar lymph nodes, in keeping with granulomatous disease. 1.5 cm short axis AP window lymph node, previously 1.3 cm. Additional shotty noncalcified mediastinal lymph nodes are again seen. Bilateral gynecomastia. No axillary adenopathy. Lungs/pleura: Moderate left pleural effusion, increased as compared to prior. Moderate right pleural effusion is also slightly increased. Dense consolidation with air bronchograms is again demonstrated within the right lower lobe and right middle lobe. Aeration has improved within the anterior right lower lobe though moderate opacity remains in the right lower lobe. Increased opacity is seen within the right upper lobe posteriorly. Decreased heterogeneous opacity within the lingula as compared to prior. Heterogeneous opacity along the left major fissure superiorly is not significantly changed. Increased consolidation is seen of the posterior left lower lobe adjacent to effusion. Nonocclusive filling defect within the posterior aspect of the bronchus intermedius. Filling defects are seen within left lower lobe bronchi. 1.4 cm noncalcified pulmonary nodule is again demonstrated within the left lower lobe. No pneumothorax. Upper Abdomen: Postoperative change of the stomach. Soft Tissues/Bones: Degenerative change of the spine. Moderate bilateral pleural effusions, slightly increased as compared to prior. Partial atelectasis of the posterior left lower lobe.  Multifocal bilateral airspace disease, improving within the lingula and right lower lobe though increasing within the right upper lobe as compared to prior. Increased size of an AP window lymph node, likely reactive. Filling defects within the bronchus intermedius and left lower lobe bronchi, which can be due to mucoid impaction or aspiration. Indeterminate 1.4 cm left lower lobe pulmonary nodule again noted, for which short-term follow-up chest CT after treatment and resolution of any acute symptoms is recommended to ensure resolution. If this does not resolve, it is of a size amenable to evaluation by PET-CT. CT CHEST W CONTRAST    Result Date: 1/8/2023  EXAMINATION: CT OF THE CHEST WITH CONTRAST 1/8/2023 1:20 pm TECHNIQUE: CT of the chest was performed with the administration of intravenous contrast. Multiplanar reformatted images are provided for review. Automated exposure control, iterative reconstruction, and/or weight based adjustment of the mA/kV was utilized to reduce the radiation dose to as low as reasonably achievable. COMPARISON: Recent chest x-ray Prior chest CT 2017 HISTORY: ORDERING SYSTEM PROVIDED HISTORY: Left lung base nodule, rule out neoplasm TECHNOLOGIST PROVIDED HISTORY: Reason for exam:->Left lung base nodule, rule out neoplasm Reason for Exam: shortness of breath,cough,pneumonia Relevant Medical/Surgical History: smoker x 25 years FINDINGS: Mediastinum: Thyroid gland is unremarkable. No intimal flap seen in aorta. No pericardial calcification noted. Small mediastinal and hilar nodes are noted. A small lymph node in the left paratracheal region, inferiorly in the AP window measures 1.2 cm by 1.6 cm. Lungs/pleura: On the right, tiny right-sided pleural effusion is seen. There is dense and patchy consolidative change seen in the right middle lobe and right lower lobe with associated air bronchograms.   Subtle patchy ground-glass opacity is seen posteriorly in the right upper lobe On the left, patchy parenchymal opacity is seen posteriorly in the left upper lobe, with consolidative change also seen in the lingula, and scattered throughout the left lower lobe. A more focal nodular density is seen peripherally in the left lower lobe, measuring 1.2 cm x 1.2 cm. Filling defects are seen in the airways on the left, likely mucous plugging Tiny pleural effusion is seen on the left Upper Abdomen: Adrenal glands incompletely imaged. Postsurgical change seen in the stomach. Soft Tissues/Bones: Spurring is seen in the spine. Spurring is seen in the shoulder joints. .  There is gynecomastia     Multifocal pneumonia,right greater than left with tiny pleural effusions. A more focal nodular density in the left lower lobe is indeterminate measuring 1.2 cm in size. Recommend short-term follow-up chest CT after treatment approximately 4-6 weeks time to assess for change. Small mediastinal nodes are seen which may simply be reactive. RECOMMENDATIONS: *pulmonary management*     XR CHEST PORTABLE    Result Date: 1/13/2023  EXAMINATION: ONE XRAY VIEW OF THE CHEST 1/13/2023 7:53 am COMPARISON: 01/12/2023 radiograph HISTORY: ORDERING SYSTEM PROVIDED HISTORY: Pneumonia TECHNOLOGIST PROVIDED HISTORY: Reason for exam:->Pneumonia Reason for Exam: pneumonia FINDINGS: The heart, mediastinum and pulmonary vascularity are normal.  Dense airspace opacification is present in the right mid and lower lung zone stable from prior exam.  Left lung clear. No significant skeletal finding. Relatively stable pattern of pneumonia in the right lung. XR CHEST PORTABLE    Result Date: 1/12/2023  EXAMINATION: ONE XRAY VIEW OF THE CHEST 1/12/2023 11:09 am COMPARISON: 01/10/2023 radiograph HISTORY: ORDERING SYSTEM PROVIDED HISTORY: pna, empyema TECHNOLOGIST PROVIDED HISTORY: Reason for exam:->pna, empyema Reason for Exam: pna, empyema FINDINGS: The heart and mediastinum are normal.  Mild perihilar opacities are stable on the right greater than left.   Dense airspace opacification persists in the right mid and lower lung zone. Small pleural effusion. No skeletal finding. Slightly improved aeration in the lungs from prior exam.  Dense airspace changes persist in the right lower lung zone with a small pleural effusion. XR CHEST PORTABLE    Result Date: 1/8/2023  EXAMINATION: ONE XRAY VIEW OF THE CHEST 1/7/2023 11:39 pm COMPARISON: 01/07/2023 HISTORY: ORDERING SYSTEM PROVIDED HISTORY: status post right thoracentesis TECHNOLOGIST PROVIDED HISTORY: Reason for exam:->status post right thoracentesis Reason for Exam: status post right thoracentesis FINDINGS: No pneumothorax identified. Redemonstration of infiltrate in the right mid to lower lung field. Streaky opacities in the left suprahilar region and left lung base again noted. Cardiac and mediastinal contours appear unchanged. No pneumothorax identified. Redemonstration of bilateral airspace disease, right greater than left. XR CHEST PORTABLE    Result Date: 1/7/2023  EXAMINATION: ONE XRAY VIEW OF THE CHEST 1/7/2023 8:43 pm COMPARISON: 11/18/2020 HISTORY: ORDERING SYSTEM PROVIDED HISTORY: chest pain TECHNOLOGIST PROVIDED HISTORY: Reason for exam:->chest pain Reason for Exam: CP, SOB, cough FINDINGS: The heart is borderline enlarged unchanged. The pulmonary vessels are engorged centrally. There is moderate airspace opacity along the right perihilar region extending inferiorly. There is some hazy ground-glass opacity scattered along the left upper and lower lung which is more prominent. There is mild blunting of the right costophrenic sulcus which is more apparent. There is a rounded nodule projecting along left lung base laterally measuring 1.6 cm which is apparent     Mild central pulmonary congestion Moderate airspace opacity right lung base and hazy opacities along the left upper lobe and left lung base which could represent multisegmental bronchopneumonia. Recommend short-term follow-up Small questionable small right pleural effusion.  1.6 cm nodule left lung base laterally which is more apparent could represent a small neoplasm. Recommend CT correlation     Gram Stain Result  Abnormal   Cytospin performed,no quantitation   WBC's (Polymorphonuclear)   Gram positive cocci   [facility]   Organism Streptococcus pneumoniae Abnormal   [facility]   Body Fluid Culture, Sterile Heavy growth  [facility]        Susceptibility    Streptococcus pneumoniae (1)    Antibiotic Interpretation Microscan  Method Status    benzylpenicillin (meningitis) Sensitive <=0.06 mcg/mL BACTERIAL SUSCEPTIBILITY PANEL BY NASIMA     benzylpenicillin (oral) Sensitive <=0.06 mcg/mL BACTERIAL SUSCEPTIBILITY PANEL BY NASIMA     benzylpenicillin (pneumoniae) Sensitive <=0.06 mcg/mL BACTERIAL SUSCEPTIBILITY PANEL BY NASIMA     cefotaxime (meningitis) Sensitive <=0.12 mcg/mL BACTERIAL SUSCEPTIBILITY PANEL BY [facility]IMA     Cefotaxime (other) Sensitive <=0.12 mcg/mL BACTERIAL SUSCEPTIBILITY PANEL BY [facility]IMA     cefTRIAXone (meningitis) Sensitive <=0.12 mcg/mL BACTERIAL SUSCEPTIBILITY PANEL BY [facility]IMA     Ceftriaxone (other) Sensitive <=0.12 mcg/mL BACTERIAL SUSCEPTIBILITY PANEL BY NASIMA     erythromycin Sensitive <=0.12 mcg/mL BACTERIAL SUSCEPTIBILITY PANEL BY NASIMA     trimethoprim-sulfamethoxazole Sensitive <=10 mcg/mL BACTERIAL SUSCEPTIBILITY PANEL BY NASIMA     vancomycin Sensitive                         Assessment:  Principal Problem:    Pneumonia of right lower lobe due to infectious organism  Active Problems:    DM2 (diabetes mellitus, type 2) (HCC)    Smoker    ADHD    History of sleeve gastrectomy    Empyema (HCC)    Leukocytosis    Bandemia    Thrombocytosis    Normocytic normochromic anemia    Uncontrolled hypertension    Elevated BUN    Elevated procalcitonin    Pleuritic chest pain    Hemoptysis    Bacteremia due to Streptococcus  Resolved Problems:    * No resolved hospital problems.  *          Plan:   Oxygen supplementation, if required, to keep saturation between 90 and 94% only  Patient was on room air oxygen when seen  Patient continues to be symptomatic and was having audible chest congestion when evaluated  Patient repeat CT of the chest was reviewed  Patient does have bilateral pulm infiltrates with mucous plugging along with that patient also has loculated pleural effusion which appears to be empyema secondary to streptococcal pneumonia  Antimicrobials as per IM  Patient does have pleuritic chest pain because of involvement of the pleural surface which was discussed in detail  Patient was offered bronchoscopy few days back and patient wanted to defer it as per discussion with Dr. John Paul Glynn  Patient continues to be having some chest congestion and can be revisited if patient wants  Patient pleural fluids are positive for streptococcal infection  May need chest tube with thrombolytic therapy versus decortication  Analgesia as per IM  Will start the patient on bronchodilators and reassess  No need for any systemic steroids for now  Trend the WBC and platelet count  Antihypertensives as per IM  Nicotine replacement therapy  PUD and DVT prophylaxis as per IM    Case d/w patient and nursing             Electronically signed by:  Abdirashid Gonzales MD    1/14/2023    11:18 AM.

## 2023-01-14 NOTE — PLAN OF CARE
Problem: Pain  Goal: Verbalizes/displays adequate comfort level or baseline comfort level  Outcome: Progressing   Monitor for pain. Currently has right rib pain. Taking Norco and Dilaudid prn.

## 2023-01-15 LAB
ALPHA-1 ANTITRYPSIN PHENOTYPE: ABNORMAL
ALPHA-1 ANTITRYPSIN: 322 MG/DL (ref 90–200)
ANION GAP SERPL CALCULATED.3IONS-SCNC: 6 MMOL/L (ref 3–16)
ANION GAP SERPL CALCULATED.3IONS-SCNC: 7 MMOL/L (ref 3–16)
ANISOCYTOSIS: ABNORMAL
BANDED NEUTROPHILS RELATIVE PERCENT: 5 % (ref 0–7)
BASOPHILS ABSOLUTE: 0 K/UL (ref 0–0.2)
BASOPHILS RELATIVE PERCENT: 0 %
BUN BLDV-MCNC: 13 MG/DL (ref 7–20)
BUN BLDV-MCNC: 14 MG/DL (ref 7–20)
C-REACTIVE PROTEIN: 122.1 MG/L (ref 0–5.1)
CALCIUM SERPL-MCNC: 7.7 MG/DL (ref 8.3–10.6)
CALCIUM SERPL-MCNC: 7.9 MG/DL (ref 8.3–10.6)
CHLORIDE BLD-SCNC: 90 MMOL/L (ref 99–110)
CHLORIDE BLD-SCNC: 92 MMOL/L (ref 99–110)
CHOLESTEROL, TOTAL: 82 MG/DL (ref 0–199)
CO2: 27 MMOL/L (ref 21–32)
CO2: 28 MMOL/L (ref 21–32)
CREAT SERPL-MCNC: 0.6 MG/DL (ref 0.8–1.3)
CREAT SERPL-MCNC: <0.5 MG/DL (ref 0.8–1.3)
EOSINOPHILS ABSOLUTE: 0 K/UL (ref 0–0.6)
EOSINOPHILS RELATIVE PERCENT: 0 %
GFR SERPL CREATININE-BSD FRML MDRD: >60 ML/MIN/{1.73_M2}
GFR SERPL CREATININE-BSD FRML MDRD: >60 ML/MIN/{1.73_M2}
GLUCOSE BLD-MCNC: 109 MG/DL (ref 70–99)
GLUCOSE BLD-MCNC: 112 MG/DL (ref 70–99)
GLUCOSE BLD-MCNC: 120 MG/DL (ref 70–99)
GLUCOSE BLD-MCNC: 131 MG/DL (ref 70–99)
GLUCOSE BLD-MCNC: 90 MG/DL (ref 70–99)
GLUCOSE BLD-MCNC: 96 MG/DL (ref 70–99)
HCT VFR BLD CALC: 32.4 % (ref 40.5–52.5)
HDLC SERPL-MCNC: 23 MG/DL (ref 40–60)
HEMATOLOGY PATH CONSULT: NO
HEMOGLOBIN: 11 G/DL (ref 13.5–17.5)
HYPERSEGMENTED NEUTROPHILS: PRESENT
LDL CHOLESTEROL CALCULATED: 48 MG/DL
LYMPHOCYTES ABSOLUTE: 1 K/UL (ref 1–5.1)
LYMPHOCYTES RELATIVE PERCENT: 6 %
MCH RBC QN AUTO: 30.4 PG (ref 26–34)
MCHC RBC AUTO-ENTMCNC: 33.8 G/DL (ref 31–36)
MCV RBC AUTO: 89.9 FL (ref 80–100)
MONOCYTES ABSOLUTE: 1.4 K/UL (ref 0–1.3)
MONOCYTES RELATIVE PERCENT: 8 %
NEUTROPHILS ABSOLUTE: 14.8 K/UL (ref 1.7–7.7)
NEUTROPHILS RELATIVE PERCENT: 80 %
OSMOLALITY URINE: 261 MOSM/KG (ref 390–1070)
OSMOLALITY: 265 MOSM/KG (ref 280–301)
PDW BLD-RTO: 14.2 % (ref 12.4–15.4)
PERFORMED ON: ABNORMAL
PERFORMED ON: NORMAL
PLATELET # BLD: 1038 K/UL (ref 135–450)
PLATELET SLIDE REVIEW: ABNORMAL
PMV BLD AUTO: 7.7 FL (ref 5–10.5)
POIKILOCYTES: ABNORMAL
POLYCHROMASIA: ABNORMAL
POTASSIUM SERPL-SCNC: 5.3 MMOL/L (ref 3.5–5.1)
POTASSIUM SERPL-SCNC: 5.7 MMOL/L (ref 3.5–5.1)
PROMYELOCYTES PERCENT: 1 %
RBC # BLD: 3.6 M/UL (ref 4.2–5.9)
SEDIMENTATION RATE, ERYTHROCYTE: 90 MM/HR (ref 0–20)
SLIDE REVIEW: ABNORMAL
SODIUM BLD-SCNC: 124 MMOL/L (ref 136–145)
SODIUM BLD-SCNC: 126 MMOL/L (ref 136–145)
SODIUM URINE: 27 MMOL/L
TOXIC GRANULATION: PRESENT
TRIGL SERPL-MCNC: 53 MG/DL (ref 0–150)
VACUOLATED NEUTROPHILS: PRESENT
VLDLC SERPL CALC-MCNC: 11 MG/DL
WBC # BLD: 17.2 K/UL (ref 4–11)

## 2023-01-15 PROCEDURE — 81270 JAK2 GENE: CPT

## 2023-01-15 PROCEDURE — 2580000003 HC RX 258: Performed by: INTERNAL MEDICINE

## 2023-01-15 PROCEDURE — 6370000000 HC RX 637 (ALT 250 FOR IP): Performed by: INTERNAL MEDICINE

## 2023-01-15 PROCEDURE — 6360000002 HC RX W HCPCS: Performed by: INTERNAL MEDICINE

## 2023-01-15 PROCEDURE — 83935 ASSAY OF URINE OSMOLALITY: CPT

## 2023-01-15 PROCEDURE — 36415 COLL VENOUS BLD VENIPUNCTURE: CPT

## 2023-01-15 PROCEDURE — 2500000003 HC RX 250 WO HCPCS: Performed by: INTERNAL MEDICINE

## 2023-01-15 PROCEDURE — 6370000000 HC RX 637 (ALT 250 FOR IP): Performed by: NURSE PRACTITIONER

## 2023-01-15 PROCEDURE — 94667 MNPJ CHEST WALL 1ST: CPT

## 2023-01-15 PROCEDURE — 94640 AIRWAY INHALATION TREATMENT: CPT

## 2023-01-15 PROCEDURE — 80048 BASIC METABOLIC PNL TOTAL CA: CPT

## 2023-01-15 PROCEDURE — 83930 ASSAY OF BLOOD OSMOLALITY: CPT

## 2023-01-15 PROCEDURE — 1200000000 HC SEMI PRIVATE

## 2023-01-15 PROCEDURE — 86140 C-REACTIVE PROTEIN: CPT

## 2023-01-15 PROCEDURE — 84300 ASSAY OF URINE SODIUM: CPT

## 2023-01-15 PROCEDURE — 99232 SBSQ HOSP IP/OBS MODERATE 35: CPT | Performed by: INTERNAL MEDICINE

## 2023-01-15 PROCEDURE — 85025 COMPLETE CBC W/AUTO DIFF WBC: CPT

## 2023-01-15 PROCEDURE — 85652 RBC SED RATE AUTOMATED: CPT

## 2023-01-15 PROCEDURE — 80061 LIPID PANEL: CPT

## 2023-01-15 RX ORDER — ALBUTEROL SULFATE 2.5 MG/3ML
2.5 SOLUTION RESPIRATORY (INHALATION) DAILY
Status: DISCONTINUED | OUTPATIENT
Start: 2023-01-17 | End: 2023-01-18 | Stop reason: HOSPADM

## 2023-01-15 RX ORDER — DEXTROSE MONOHYDRATE 25 G/50ML
25 INJECTION, SOLUTION INTRAVENOUS ONCE
Status: COMPLETED | OUTPATIENT
Start: 2023-01-15 | End: 2023-01-15

## 2023-01-15 RX ADMIN — ENOXAPARIN SODIUM 40 MG: 100 INJECTION SUBCUTANEOUS at 08:15

## 2023-01-15 RX ADMIN — ALBUTEROL SULFATE 2.5 MG: 2.5 SOLUTION RESPIRATORY (INHALATION) at 20:45

## 2023-01-15 RX ADMIN — HYDROMORPHONE HYDROCHLORIDE 1 MG: 1 INJECTION, SOLUTION INTRAMUSCULAR; INTRAVENOUS; SUBCUTANEOUS at 12:35

## 2023-01-15 RX ADMIN — HYDROMORPHONE HYDROCHLORIDE 1 MG: 1 INJECTION, SOLUTION INTRAMUSCULAR; INTRAVENOUS; SUBCUTANEOUS at 02:57

## 2023-01-15 RX ADMIN — GUAIFENESIN 600 MG: 600 TABLET, EXTENDED RELEASE ORAL at 08:15

## 2023-01-15 RX ADMIN — VASOPRESSIN: 20 INJECTION, SOLUTION INTRAVENOUS at 13:51

## 2023-01-15 RX ADMIN — DEXTROAMPHETAMINE SACCHARATE, AMPHETAMINE ASPARTATE, DEXTROAMPHETAMINE SULFATE, AMPHETAMINE SULFATE TABLETS, 10 MG,CLL 30 MG: 2.5; 2.5; 2.5; 2.5 TABLET ORAL at 06:28

## 2023-01-15 RX ADMIN — HYDROMORPHONE HYDROCHLORIDE 1 MG: 1 INJECTION, SOLUTION INTRAMUSCULAR; INTRAVENOUS; SUBCUTANEOUS at 06:29

## 2023-01-15 RX ADMIN — METRONIDAZOLE 500 MG: 250 TABLET ORAL at 02:57

## 2023-01-15 RX ADMIN — HYDROMORPHONE HYDROCHLORIDE 1 MG: 1 INJECTION, SOLUTION INTRAMUSCULAR; INTRAVENOUS; SUBCUTANEOUS at 23:12

## 2023-01-15 RX ADMIN — METRONIDAZOLE 500 MG: 250 TABLET ORAL at 12:10

## 2023-01-15 RX ADMIN — ALBUTEROL SULFATE 2.5 MG: 2.5 SOLUTION RESPIRATORY (INHALATION) at 12:18

## 2023-01-15 RX ADMIN — Medication 10 ML: at 02:58

## 2023-01-15 RX ADMIN — INSULIN HUMAN 10 UNITS: 100 INJECTION, SOLUTION PARENTERAL at 13:06

## 2023-01-15 RX ADMIN — CEFTRIAXONE 2000 MG: 2 INJECTION, POWDER, FOR SOLUTION INTRAMUSCULAR; INTRAVENOUS at 10:54

## 2023-01-15 RX ADMIN — LISINOPRIL 10 MG: 10 TABLET ORAL at 08:15

## 2023-01-15 RX ADMIN — BENZONATATE 200 MG: 100 CAPSULE ORAL at 19:43

## 2023-01-15 RX ADMIN — SODIUM ZIRCONIUM CYCLOSILICATE 10 G: 5 POWDER, FOR SUSPENSION ORAL at 13:50

## 2023-01-15 RX ADMIN — ALBUTEROL SULFATE 2.5 MG: 2.5 SOLUTION RESPIRATORY (INHALATION) at 07:37

## 2023-01-15 RX ADMIN — HYDROCODONE BITARTRATE AND ACETAMINOPHEN 1 TABLET: 5; 325 TABLET ORAL at 08:15

## 2023-01-15 RX ADMIN — GABAPENTIN 300 MG: 300 CAPSULE ORAL at 13:50

## 2023-01-15 RX ADMIN — DEXTROSE MONOHYDRATE 25 G: 25 INJECTION, SOLUTION INTRAVENOUS at 13:06

## 2023-01-15 RX ADMIN — GABAPENTIN 300 MG: 300 CAPSULE ORAL at 19:43

## 2023-01-15 RX ADMIN — GUAIFENESIN 600 MG: 600 TABLET, EXTENDED RELEASE ORAL at 19:43

## 2023-01-15 RX ADMIN — HYDROMORPHONE HYDROCHLORIDE 1 MG: 1 INJECTION, SOLUTION INTRAMUSCULAR; INTRAVENOUS; SUBCUTANEOUS at 09:26

## 2023-01-15 RX ADMIN — Medication 1 CAPSULE: at 17:24

## 2023-01-15 RX ADMIN — Medication 10 ML: at 06:29

## 2023-01-15 RX ADMIN — SODIUM ZIRCONIUM CYCLOSILICATE 10 G: 5 POWDER, FOR SUSPENSION ORAL at 19:30

## 2023-01-15 RX ADMIN — HYDROMORPHONE HYDROCHLORIDE 1 MG: 1 INJECTION, SOLUTION INTRAMUSCULAR; INTRAVENOUS; SUBCUTANEOUS at 19:44

## 2023-01-15 RX ADMIN — DEXTROAMPHETAMINE SACCHARATE, AMPHETAMINE ASPARTATE, DEXTROAMPHETAMINE SULFATE, AMPHETAMINE SULFATE TABLETS, 10 MG,CLL 30 MG: 2.5; 2.5; 2.5; 2.5 TABLET ORAL at 15:38

## 2023-01-15 RX ADMIN — Medication 10 ML: at 19:43

## 2023-01-15 RX ADMIN — Medication 1 CAPSULE: at 08:15

## 2023-01-15 RX ADMIN — METRONIDAZOLE 500 MG: 250 TABLET ORAL at 19:43

## 2023-01-15 RX ADMIN — GABAPENTIN 300 MG: 300 CAPSULE ORAL at 08:15

## 2023-01-15 RX ADMIN — ASPIRIN 81 MG: 81 TABLET, COATED ORAL at 08:15

## 2023-01-15 RX ADMIN — Medication 10 ML: at 23:12

## 2023-01-15 RX ADMIN — HYDROMORPHONE HYDROCHLORIDE 1 MG: 1 INJECTION, SOLUTION INTRAMUSCULAR; INTRAVENOUS; SUBCUTANEOUS at 15:38

## 2023-01-15 RX ADMIN — ALBUTEROL SULFATE 2.5 MG: 2.5 SOLUTION RESPIRATORY (INHALATION) at 15:51

## 2023-01-15 ASSESSMENT — PAIN DESCRIPTION - DESCRIPTORS
DESCRIPTORS: SHOOTING;STABBING
DESCRIPTORS: STABBING
DESCRIPTORS: STABBING
DESCRIPTORS: HEAVINESS;PRESSURE
DESCRIPTORS: STABBING
DESCRIPTORS: SHOOTING

## 2023-01-15 ASSESSMENT — PAIN SCALES - GENERAL
PAINLEVEL_OUTOF10: 9
PAINLEVEL_OUTOF10: 9
PAINLEVEL_OUTOF10: 7
PAINLEVEL_OUTOF10: 8
PAINLEVEL_OUTOF10: 7
PAINLEVEL_OUTOF10: 8

## 2023-01-15 ASSESSMENT — PAIN DESCRIPTION - LOCATION
LOCATION: RIB CAGE
LOCATION: RIB CAGE
LOCATION: CHEST;SHOULDER
LOCATION: CHEST;RIB CAGE
LOCATION: CHEST
LOCATION: RIB CAGE
LOCATION: CHEST;SHOULDER
LOCATION: RIB CAGE
LOCATION: CHEST
LOCATION: RIB CAGE

## 2023-01-15 ASSESSMENT — PAIN DESCRIPTION - ORIENTATION
ORIENTATION: RIGHT;LEFT
ORIENTATION: RIGHT
ORIENTATION: RIGHT
ORIENTATION: RIGHT;LEFT
ORIENTATION: RIGHT
ORIENTATION: RIGHT

## 2023-01-15 NOTE — PROGRESS NOTES
INPATIENT PULMONARY CRITICAL CARE PROGRESS NOTE      Reason for visit    Multifocal pneumonia with empyema    SUBJECTIVE: Patient when seen this morning continues to have some chest congestion, but patient states that he is able to bring up some phlegm which is dark ;no hemoptysis overnight  patient is doing chest vest with improvement in chest congestion  patient has less pleuritic chest pain, patient was afebrile and hemodynamically maintained, patient's glycemic control was acceptable, patient was on room air oxygen with saturation of 94% when seen, patient was alert and oriented, no other pertinent review of system of concern         Physical Exam:  Blood pressure 125/75, pulse 82, temperature 99.1 °F (37.3 °C), temperature source Oral, resp. rate 19, height 5' 8\" (1.727 m), weight 150 lb 1.6 oz (68.1 kg), SpO2 94 %.'     Constitutional: No respiratory distress, audible chest congestion  HENT:  Oropharynx is clear and moist. No thyromegaly. Eyes:  Conjunctivae are normal. Pupils equal, round, and reactive to light. No scleral icterus. Neck: . No tracheal deviation present. No obvious thyroid mass. Cardiovascular: Normal rate, regular rhythm, normal heart sounds. No right ventricular heave. No lower extremity edema. Pulmonary/Chest: No wheezes. Bilateral coarse rales(Rt>Lt). Chest wall is not dull to percussion. No accessory muscle usage or stridor. Decreased breath sound intensity  Abdominal: Soft. Bowel sounds present. No distension or hernia. No tenderness. Musculoskeletal: No cyanosis. No clubbing. No obvious joint deformity. Lymphadenopathy: No cervical or supraclavicular adenopathy. Skin: Skin is warm and dry. No rash or nodules on the exposed extremities. Psychiatric: Normal mood and affect. Behavior is normal.  No anxiety. Neurologic: Alert, awake and oriented. PERRL.   Speech fluent    Results:  CBC:   Recent Labs     01/13/23  0648 01/14/23  0614 01/15/23  0428   WBC 22.9* 16.9* 17.2*   HGB 11.3* 10.3* 11.0*   HCT 33.5* 31.5* 32.4*   MCV 90.2 90.2 89.9   * 884* 1,038*       BMP:   Recent Labs     01/13/23  0648 01/14/23  0614 01/15/23  0428   * 129* 124*   K 4.1 4.8 5.7*   CL 98* 97* 90*   CO2 25 26 27   BUN 14 13 13   CREATININE <0.5* <0.5* <0.5*         Imaging:  I have reviewed radiology images personally. CT CHEST WO CONTRAST   Final Result   Similar small to moderate right and small left pleural effusions. Again seen are bilateral airspace opacities, right greater than left,   compatible with infection. Similar appearance of a 1.4 cm left lower lobe pulmonary nodule. Similar mild mediastinal lymphadenopathy. Similar ectasia of the ascending thoracic aorta and mild dilation of the main   pulmonary artery. RECOMMENDATIONS:   Recommend a short-term follow-up chest CT after resolution of acute symptoms,   to further assess the indeterminate left lower lobe pulmonary nodule. XR CHEST PORTABLE   Final Result   Relatively stable pattern of pneumonia in the right lung. XR CHEST PORTABLE   Final Result   Slightly improved aeration in the lungs from prior exam.  Dense airspace   changes persist in the right lower lung zone with a small pleural effusion. CT CHEST WO CONTRAST   Final Result   Moderate bilateral pleural effusions, slightly increased as compared to   prior. Partial atelectasis of the posterior left lower lobe. Multifocal bilateral airspace disease, improving within the lingula and right   lower lobe though increasing within the right upper lobe as compared to   prior. Increased size of an AP window lymph node, likely reactive. Filling defects within the bronchus intermedius and left lower lobe bronchi,   which can be due to mucoid impaction or aspiration.       Indeterminate 1.4 cm left lower lobe pulmonary nodule again noted, for which   short-term follow-up chest CT after treatment and resolution of any acute   symptoms is recommended to ensure resolution. If this does not resolve, it   is of a size amenable to evaluation by PET-CT. XR CHEST (2 VW)   Final Result   Bilateral pneumonias without any improvement. Follow-up advised. CT CHEST W CONTRAST   Final Result   Multifocal pneumonia,right greater than left with tiny pleural effusions. A more focal nodular density in the left lower lobe is indeterminate   measuring 1.2 cm in size. Recommend short-term follow-up chest CT after   treatment approximately 4-6 weeks time to assess for change. Small mediastinal nodes are seen which may simply be reactive. RECOMMENDATIONS:   *pulmonary management*         XR CHEST PORTABLE   Final Result   No pneumothorax identified. Redemonstration of bilateral airspace disease,   right greater than left. XR CHEST PORTABLE   Final Result   Mild central pulmonary congestion      Moderate airspace opacity right lung base and hazy opacities along the left   upper lobe and left lung base which could represent multisegmental   bronchopneumonia. Recommend short-term follow-up      Small questionable small right pleural effusion. 1.6 cm nodule left lung base laterally which is more apparent could represent   a small neoplasm. Recommend CT correlation           CT CHEST WO CONTRAST    Result Date: 1/13/2023  EXAMINATION: CT OF THE CHEST WITHOUT CONTRAST 1/13/2023 3:05 pm TECHNIQUE: CT of the chest was performed without the administration of intravenous contrast. Multiplanar reformatted images are provided for review. Automated exposure control, iterative reconstruction, and/or weight based adjustment of the mA/kV was utilized to reduce the radiation dose to as low as reasonably achievable.  COMPARISON: Chest radiograph 1/13/2023, chest CT 1/11/2023 HISTORY: ORDERING SYSTEM PROVIDED HISTORY: Extension of pleural effusion TECHNOLOGIST PROVIDED HISTORY: Reason for exam:->Extension of pleural effusion Reason for Exam: CP, SOB. coughing up a small amount of blood,. Relevant Medical/Surgical History: c-diff, HTN. .. FINDINGS: Mediastinum: Similar mildly enlarged mediastinal lymph nodes. No definite new lymphadenopathy is seen. Small volume pericardial fluid. Ectatic ascending thoracic aorta, measuring 4.3 cm in diameter. Dilated main pulmonary artery, measuring 3.7 cm in diameter. Lungs/pleura: Previously seen intraluminal contents within the right lower lobe bronchus are less conspicuous on the current exam.  Similar appearance of a small to moderate-sized right pleural effusion. Similar appearance of a small left pleural effusion. No pneumothorax is seen. Again seen are extensive airspace opacities in the lungs, right greater than left, compatible with infection. Similar 1.4 cm solid left lower lobe pulmonary nodule. Upper Abdomen: Limited images of the upper abdomen demonstrate no obvious acute abnormality. Small hiatal hernia. Soft Tissues/Bones: No acute bony abnormality is seen. Similar small to moderate right and small left pleural effusions. Again seen are bilateral airspace opacities, right greater than left, compatible with infection. Similar appearance of a 1.4 cm left lower lobe pulmonary nodule. Similar mild mediastinal lymphadenopathy. Similar ectasia of the ascending thoracic aorta and mild dilation of the main pulmonary artery. RECOMMENDATIONS: Recommend a short-term follow-up chest CT after resolution of acute symptoms, to further assess the indeterminate left lower lobe pulmonary nodule. CT CHEST WO CONTRAST    Result Date: 1/11/2023  EXAMINATION: CT OF THE CHEST WITHOUT CONTRAST 1/11/2023 10:13 am TECHNIQUE: CT of the chest was performed without the administration of intravenous contrast. Multiplanar reformatted images are provided for review.  Automated exposure control, iterative reconstruction, and/or weight based adjustment of the mA/kV was utilized to reduce the radiation dose to as low as reasonably achievable. COMPARISON: 01/08/2023 HISTORY: ORDERING SYSTEM PROVIDED HISTORY: Pleural effusion, recurrent, pneumonia TECHNOLOGIST PROVIDED HISTORY: Reason for exam:->Pleural effusion, recurrent, pneumonia Reason for Exam: recurrent pneumonia, sob Relevant Medical/Surgical History: smoker x 40 yr FINDINGS: Mediastinum: Calcification of the thoracic aorta. Calcified mediastinal and hilar lymph nodes, in keeping with granulomatous disease. 1.5 cm short axis AP window lymph node, previously 1.3 cm. Additional shotty noncalcified mediastinal lymph nodes are again seen. Bilateral gynecomastia. No axillary adenopathy. Lungs/pleura: Moderate left pleural effusion, increased as compared to prior. Moderate right pleural effusion is also slightly increased. Dense consolidation with air bronchograms is again demonstrated within the right lower lobe and right middle lobe. Aeration has improved within the anterior right lower lobe though moderate opacity remains in the right lower lobe. Increased opacity is seen within the right upper lobe posteriorly. Decreased heterogeneous opacity within the lingula as compared to prior. Heterogeneous opacity along the left major fissure superiorly is not significantly changed. Increased consolidation is seen of the posterior left lower lobe adjacent to effusion. Nonocclusive filling defect within the posterior aspect of the bronchus intermedius. Filling defects are seen within left lower lobe bronchi. 1.4 cm noncalcified pulmonary nodule is again demonstrated within the left lower lobe. No pneumothorax. Upper Abdomen: Postoperative change of the stomach. Soft Tissues/Bones: Degenerative change of the spine. Moderate bilateral pleural effusions, slightly increased as compared to prior. Partial atelectasis of the posterior left lower lobe.  Multifocal bilateral airspace disease, improving within the lingula and right lower lobe though increasing within the right upper lobe as compared to prior. Increased size of an AP window lymph node, likely reactive. Filling defects within the bronchus intermedius and left lower lobe bronchi, which can be due to mucoid impaction or aspiration. Indeterminate 1.4 cm left lower lobe pulmonary nodule again noted, for which short-term follow-up chest CT after treatment and resolution of any acute symptoms is recommended to ensure resolution. If this does not resolve, it is of a size amenable to evaluation by PET-CT. CT CHEST W CONTRAST    Result Date: 1/8/2023  EXAMINATION: CT OF THE CHEST WITH CONTRAST 1/8/2023 1:20 pm TECHNIQUE: CT of the chest was performed with the administration of intravenous contrast. Multiplanar reformatted images are provided for review. Automated exposure control, iterative reconstruction, and/or weight based adjustment of the mA/kV was utilized to reduce the radiation dose to as low as reasonably achievable. COMPARISON: Recent chest x-ray Prior chest CT 2017 HISTORY: ORDERING SYSTEM PROVIDED HISTORY: Left lung base nodule, rule out neoplasm TECHNOLOGIST PROVIDED HISTORY: Reason for exam:->Left lung base nodule, rule out neoplasm Reason for Exam: shortness of breath,cough,pneumonia Relevant Medical/Surgical History: smoker x 25 years FINDINGS: Mediastinum: Thyroid gland is unremarkable. No intimal flap seen in aorta. No pericardial calcification noted. Small mediastinal and hilar nodes are noted. A small lymph node in the left paratracheal region, inferiorly in the AP window measures 1.2 cm by 1.6 cm. Lungs/pleura: On the right, tiny right-sided pleural effusion is seen. There is dense and patchy consolidative change seen in the right middle lobe and right lower lobe with associated air bronchograms.   Subtle patchy ground-glass opacity is seen posteriorly in the right upper lobe On the left, patchy parenchymal opacity is seen posteriorly in the left upper lobe, with consolidative change also seen in the lingula, and scattered throughout the left lower lobe. A more focal nodular density is seen peripherally in the left lower lobe, measuring 1.2 cm x 1.2 cm. Filling defects are seen in the airways on the left, likely mucous plugging Tiny pleural effusion is seen on the left Upper Abdomen: Adrenal glands incompletely imaged. Postsurgical change seen in the stomach. Soft Tissues/Bones: Spurring is seen in the spine. Spurring is seen in the shoulder joints. .  There is gynecomastia     Multifocal pneumonia,right greater than left with tiny pleural effusions. A more focal nodular density in the left lower lobe is indeterminate measuring 1.2 cm in size. Recommend short-term follow-up chest CT after treatment approximately 4-6 weeks time to assess for change. Small mediastinal nodes are seen which may simply be reactive. RECOMMENDATIONS: *pulmonary management*     XR CHEST PORTABLE    Result Date: 1/13/2023  EXAMINATION: ONE XRAY VIEW OF THE CHEST 1/13/2023 7:53 am COMPARISON: 01/12/2023 radiograph HISTORY: ORDERING SYSTEM PROVIDED HISTORY: Pneumonia TECHNOLOGIST PROVIDED HISTORY: Reason for exam:->Pneumonia Reason for Exam: pneumonia FINDINGS: The heart, mediastinum and pulmonary vascularity are normal.  Dense airspace opacification is present in the right mid and lower lung zone stable from prior exam.  Left lung clear. No significant skeletal finding. Relatively stable pattern of pneumonia in the right lung. XR CHEST PORTABLE    Result Date: 1/12/2023  EXAMINATION: ONE XRAY VIEW OF THE CHEST 1/12/2023 11:09 am COMPARISON: 01/10/2023 radiograph HISTORY: ORDERING SYSTEM PROVIDED HISTORY: pna, empyema TECHNOLOGIST PROVIDED HISTORY: Reason for exam:->pna, empyema Reason for Exam: pna, empyema FINDINGS: The heart and mediastinum are normal.  Mild perihilar opacities are stable on the right greater than left. Dense airspace opacification persists in the right mid and lower lung zone. Small pleural effusion. No skeletal finding. Slightly improved aeration in the lungs from prior exam.  Dense airspace changes persist in the right lower lung zone with a small pleural effusion. XR CHEST PORTABLE    Result Date: 1/8/2023  EXAMINATION: ONE XRAY VIEW OF THE CHEST 1/7/2023 11:39 pm COMPARISON: 01/07/2023 HISTORY: ORDERING SYSTEM PROVIDED HISTORY: status post right thoracentesis TECHNOLOGIST PROVIDED HISTORY: Reason for exam:->status post right thoracentesis Reason for Exam: status post right thoracentesis FINDINGS: No pneumothorax identified. Redemonstration of infiltrate in the right mid to lower lung field. Streaky opacities in the left suprahilar region and left lung base again noted. Cardiac and mediastinal contours appear unchanged. No pneumothorax identified. Redemonstration of bilateral airspace disease, right greater than left. XR CHEST PORTABLE    Result Date: 1/7/2023  EXAMINATION: ONE XRAY VIEW OF THE CHEST 1/7/2023 8:43 pm COMPARISON: 11/18/2020 HISTORY: ORDERING SYSTEM PROVIDED HISTORY: chest pain TECHNOLOGIST PROVIDED HISTORY: Reason for exam:->chest pain Reason for Exam: CP, SOB, cough FINDINGS: The heart is borderline enlarged unchanged. The pulmonary vessels are engorged centrally. There is moderate airspace opacity along the right perihilar region extending inferiorly. There is some hazy ground-glass opacity scattered along the left upper and lower lung which is more prominent. There is mild blunting of the right costophrenic sulcus which is more apparent. There is a rounded nodule projecting along left lung base laterally measuring 1.6 cm which is apparent     Mild central pulmonary congestion Moderate airspace opacity right lung base and hazy opacities along the left upper lobe and left lung base which could represent multisegmental bronchopneumonia. Recommend short-term follow-up Small questionable small right pleural effusion.  1.6 cm nodule left lung base laterally which is more apparent could represent a small neoplasm. Recommend CT correlation     Gram Stain Result  Abnormal   Cytospin performed,no quantitation   WBC's (Polymorphonuclear)   Gram positive cocci   [facility]   Organism Streptococcus pneumoniae Abnormal   [facility]   Body Fluid Culture, Sterile Heavy growth  [facility]        Susceptibility    Streptococcus pneumoniae (1)    Antibiotic Interpretation Microscan  Method Status    benzylpenicillin (meningitis) Sensitive <=0.06 mcg/mL BACTERIAL SUSCEPTIBILITY PANEL BY NASIMA     benzylpenicillin (oral) Sensitive <=0.06 mcg/mL BACTERIAL SUSCEPTIBILITY PANEL BY NASIMA     benzylpenicillin (pneumoniae) Sensitive <=0.06 mcg/mL BACTERIAL SUSCEPTIBILITY PANEL BY NASIMA     cefotaxime (meningitis) Sensitive <=0.12 mcg/mL BACTERIAL SUSCEPTIBILITY PANEL BY [facility]IMA     Cefotaxime (other) Sensitive <=0.12 mcg/mL BACTERIAL SUSCEPTIBILITY PANEL BY [facility]IMA     cefTRIAXone (meningitis) Sensitive <=0.12 mcg/mL BACTERIAL SUSCEPTIBILITY PANEL BY [facility]IMA     Ceftriaxone (other) Sensitive <=0.12 mcg/mL BACTERIAL SUSCEPTIBILITY PANEL BY [facility]IMA     erythromycin Sensitive <=0.12 mcg/mL BACTERIAL SUSCEPTIBILITY PANEL BY [facility]IMA     trimethoprim-sulfamethoxazole Sensitive <=10 mcg/mL BACTERIAL SUSCEPTIBILITY PANEL BY NASIMA     vancomycin Sensitive                         Assessment:  Principal Problem:    Pneumonia of right lower lobe due to infectious organism  Active Problems:    DM2 (diabetes mellitus, type 2) (HCC)    Smoker    ADHD    History of sleeve gastrectomy    Empyema (HCC)    Leukocytosis    Bandemia    Thrombocytosis    Normocytic normochromic anemia    Uncontrolled hypertension    Elevated BUN    Elevated procalcitonin    Pleuritic chest pain    Hemoptysis    Bacteremia due to Streptococcus  Resolved Problems:    * No resolved hospital problems.  *          Plan:   Oxygen supplementation, if required, to keep saturation between 90 and 94% only  Patient was on room air oxygen when seen  [place] vest to continue   Patient repeat CT of the chest was reviewed  Patient does have bilateral pulm infiltrates with mucous plugging along with that patient also has loculated pleural effusion which appears to be empyema secondary to streptococcal pneumonia  Antimicrobials as per IM  Patient does have pleuritic chest pain because of involvement of the pleural surface which was discussed in detail  Patient was offered bronchoscopy few days back and patient wanted to defer it as per discussion with Dr. Mathias Else  Patient continues to be having some chest congestion and can be revisited if patient wants  Patient pleural fluids are positive for streptococcal infection  May need chest tube with thrombolytic therapy versus decortication  Analgesia as per IM  Will start the patient on bronchodilators and reassess  No need for any systemic steroids for now  Trend the WBC and platelet count  Antihypertensives as per IM  Nicotine replacement therapy  PUD and DVT prophylaxis as per IM  PT/OT     Case d/w patient and nursing             Electronically signed by:  Salina Valenzuela MD    1/15/2023    2:28 PM.

## 2023-01-15 NOTE — PROGRESS NOTES
Hospitalist Progress Note      PCP: Lorenzo Peabody, MD    Date of Admission: 1/7/2023    Chief Complaint: Fatigue    Hospital Course:   Ashok Rivera is a 65 y.o. male. He presented to the [facility] from home. He c/o feeling ill for about a week. He describes feeling constitutionally ill w/ fatigue, malaise, loss of appetite. Along the way he has had a worsening cough, worsening dyspnea, and pretty marked right-sided pleuritic chest pain along the lateral and anterolater right costal margin. CXR with mild central pulmonary congestion. Small right pleural effusion. Thoracentesis with 300 ml fluid removed. Subjective:  Continued congestion and pain in right lung base.  Improvement with vest. No chest pain       Medications:  Reviewed    Infusion Medications    dextrose      sodium chloride 15 mL/hr at 01/13/23 1022     Scheduled Medications    guaiFENesin  600 mg Oral BID    albuterol  2.5 mg Nebulization 4x daily    lisinopril  10 mg Oral Daily    cefTRIAXone (ROCEPHIN) IV  2,000 mg IntraVENous Q24H    lactobacillus  1 capsule Oral BID WC    metroNIDAZOLE  500 mg Oral 3 times per day    aspirin  81 mg Oral Daily    enoxaparin  40 mg SubCUTAneous Daily    nicotine  1 patch TransDERmal Daily    insulin lispro  0-4 Units SubCUTAneous TID WC    insulin lispro  0-4 Units SubCUTAneous Nightly    amphetamine-dextroamphetamine  30 mg Oral BID AC    gabapentin  300 mg Oral TID     PRN Meds: acetylcysteine, glucose, dextrose bolus **OR** dextrose bolus, glucagon (rDNA), dextrose, sodium chloride flush, sodium chloride, potassium chloride **OR** potassium alternative oral replacement **OR** potassium chloride, magnesium sulfate, ondansetron **OR** ondansetron, acetaminophen **OR** acetaminophen, melatonin, calcium carbonate, HYDROcodone 5 mg - acetaminophen, ipratropium-albuterol, HYDROmorphone, albuterol, benzonatate      Intake/Output Summary (Last 24 hours) at 1/15/2023 0858  Last data filed at 1/14/2023 1841  Gross per 24 hour   Intake 330 ml   Output --   Net 330 ml         Physical Exam Performed:    /80   Pulse 86   Temp 98.4 °F (36.9 °C) (Oral)   Resp 18   Ht 5' 8\" (1.727 m)   Wt 150 lb 1.6 oz (68.1 kg)   SpO2 94%   BMI 22.82 kg/m²     General appearance: No apparent distress, appears stated age and cooperative. HEENT: Pupils equal, round, and reactive to light. Conjunctivae/corneas clear. Neck: Supple, with full range of motion. No jugular venous distention. Trachea midline. Respiratory:  Normal respiratory effort. Crackles in right lung base with some improvement   Cardiovascular: Regular rate and rhythm with normal S1/S2 without murmurs, rubs or gallops. Abdomen: Soft, non-tender, non-distended with normal bowel sounds. Musculoskeletal: No clubbing, cyanosis or edema bilaterally. Full range of motion without deformity. Skin: Skin color, texture, turgor normal.  No rashes or lesions. Neurologic:  Neurovascularly intact without any focal sensory/motor deficits. Cranial nerves: II-XII intact, grossly non-focal.  Psychiatric: Alert and oriented, thought content appropriate, normal insight  Capillary Refill: Brisk, 3 seconds, normal   Peripheral Pulses: +2 palpable, equal bilaterally       Labs:   Recent Labs     01/13/23  0648 01/14/23  0614 01/15/23  0428   WBC 22.9* 16.9* 17.2*   HGB 11.3* 10.3* 11.0*   HCT 33.5* 31.5* 32.4*   * 884* 1,038*       Recent Labs     01/13/23  0648 01/14/23 0614 01/15/23  0428   * 129* 124*   K 4.1 4.8 5.7*   CL 98* 97* 90*   CO2 25 26 27   BUN 14 13 13   CREATININE <0.5* <0.5* <0.5*   CALCIUM 7.5* 7.4* 7.9*       No results for input(s): AST, ALT, BILIDIR, BILITOT, ALKPHOS in the last 72 hours. No results for input(s): INR in the last 72 hours. No results for input(s): Marianne Archuleta in the last 72 hours.       Urinalysis:      Lab Results   Component Value Date/Time    NITRU Negative 07/23/2021 04:28 PM    WBCUA 0-2 07/23/2021 04:28 PM RBCUA 3-4 07/23/2021 04:28 PM    BLOODU Negative 07/23/2021 04:28 PM    SPECGRAV >=1.030 07/23/2021 04:28 PM    GLUCOSEU Negative 07/23/2021 04:28 PM       Radiology:  CT CHEST WO CONTRAST   Preliminary Result   Similar small to moderate right and small left pleural effusions. Again seen are bilateral airspace opacities, right greater than left,   compatible with infection. Similar appearance of a 1.4 cm left lower lobe pulmonary nodule. Similar mild mediastinal lymphadenopathy. Similar ectasia of the ascending thoracic aorta and mild dilation of the main   pulmonary artery. RECOMMENDATIONS:   Recommend a short-term follow-up chest CT after resolution of acute symptoms,   to further assess the indeterminate left lower lobe pulmonary nodule. XR CHEST PORTABLE   Final Result   Relatively stable pattern of pneumonia in the right lung. XR CHEST PORTABLE   Final Result   Slightly improved aeration in the lungs from prior exam.  Dense airspace   changes persist in the right lower lung zone with a small pleural effusion. CT CHEST WO CONTRAST   Final Result   Moderate bilateral pleural effusions, slightly increased as compared to   prior. Partial atelectasis of the posterior left lower lobe. Multifocal bilateral airspace disease, improving within the lingula and right   lower lobe though increasing within the right upper lobe as compared to   prior. Increased size of an AP window lymph node, likely reactive. Filling defects within the bronchus intermedius and left lower lobe bronchi,   which can be due to mucoid impaction or aspiration. Indeterminate 1.4 cm left lower lobe pulmonary nodule again noted, for which   short-term follow-up chest CT after treatment and resolution of any acute   symptoms is recommended to ensure resolution. If this does not resolve, it   is of a size amenable to evaluation by PET-CT.          XR CHEST (2 VW)   Final Result Bilateral pneumonias without any improvement. Follow-up advised. CT CHEST W CONTRAST   Final Result   Multifocal pneumonia,right greater than left with tiny pleural effusions. A more focal nodular density in the left lower lobe is indeterminate   measuring 1.2 cm in size. Recommend short-term follow-up chest CT after   treatment approximately 4-6 weeks time to assess for change. Small mediastinal nodes are seen which may simply be reactive. RECOMMENDATIONS:   *pulmonary management*         XR CHEST PORTABLE   Final Result   No pneumothorax identified. Redemonstration of bilateral airspace disease,   right greater than left. XR CHEST PORTABLE   Final Result   Mild central pulmonary congestion      Moderate airspace opacity right lung base and hazy opacities along the left   upper lobe and left lung base which could represent multisegmental   bronchopneumonia. Recommend short-term follow-up      Small questionable small right pleural effusion. 1.6 cm nodule left lung base laterally which is more apparent could represent   a small neoplasm.   Recommend CT correlation             IP CONSULT TO HOSPITALIST  IP CONSULT TO INFECTIOUS DISEASES  IP CONSULT TO PULMONOLOGY  IP CONSULT TO ONCOLOGY    Assessment/Plan:    Active Hospital Problems    Diagnosis     Bandemia [D72.825]      Priority: Medium    Thrombocytosis [D75.839]      Priority: Medium    Normocytic normochromic anemia [D64.9]      Priority: Medium    Uncontrolled hypertension [I10]      Priority: Medium    Elevated BUN [R79.9]      Priority: Medium    Elevated procalcitonin [R79.89]      Priority: Medium    Pleuritic chest pain [R07.81]      Priority: Medium    Hemoptysis [R04.2]      Priority: Medium    Bacteremia due to Streptococcus [R78.81, B95.5]      Priority: Medium    Empyema ([facility] [address] [J86.9]      Priority: Medium    Leukocytosis [D72.829]      Priority: Medium    DM2 (diabetes mellitus, type 2) ([address] [E11.9] Priority: Medium    Smoker [F17.200]      Priority: Medium    ADHD [F90.9]      Priority: Medium    History of sleeve gastrectomy [Z90.3]      Priority: Medium    Pneumonia of right lower lobe due to infectious organism [J18.9]      Priority: Medium     Community-acquired pneumonia, pneumococcal, Pneumonia complicated by parapneumonic effusion. Continue IV Rocephin DC azithromycin. Flagyl added since empyema is sometimes polymicrobial.  Continue supplemental oxygen. As needed pain medication for related pain. Pulm consulted. Vest therapy     Strep pneumoniae bacteremia - Ceftriaxone. ID consulted. Likely 2-3 weeks total of abx. Repeat Blood cultures NGTD. Sepsis - w/ Leukocytosis/Tachycardia POArrival 2nd to above infection. Continue IVF as appropriate and monitor clinical response w/ ABX as written. Hyponatremia - will get serum osm and urine osm, urine Na and Cr. Will also get lipid panel. Not on diuretics or IVF. Appears euvolemic. Consult nephro as well. Mild hyperkalemia - 5.7. repeat this afternoon. No hemolysis reported on lab. Abnormal CBC - promyelocyte  Thrombocytosis - Heme consulted. Has steadily increased over the week from 459 to 1038. Likely occurring due to pneumonia with empyema. JAK2 V617F, PCR for BCR/ABL, ESR, CRP     Acute hypoxic respiratory failure: Secondary to severe pneumonia. Continue supplemental oxygen and wean off as able. Pulmonary nodule: 1.6 cm nodule left lung base, concerning for small neoplasm on chest x-ray. We will get CT chest to further evaluate. HTN - w/out known CAD and no evidence of active signs/sxs of ischemia/failure. Currently controlled on home meds w/ vitals reviewed and documented as above. DM type II: Continue subcu insulin regimen including long-acting and short acting insulin as ordered, continue SSI protocol for more adequate glycemic control.      ADHD: Continue outpatient Adderall    Chronic back pain - continue gabapentin    DVT Prophylaxis: Lovenox  Diet: ADULT DIET;  Regular; 4 carb choices (60 gm/meal)  ADULT ORAL NUTRITION SUPPLEMENT; Breakfast, Lunch, Dinner; Diabetic Oral Supplement  Code Status: Full Code  PT/OT Eval Status: Not indicated    Dispo - Possibly stable in 1-2 days     Appropriate for A1 Discharge Unit: No      Cookie Last, DO

## 2023-01-15 NOTE — CONSULTS
Consult placed    Who:Dr. Montoya ( Hemo/Onc)  Date:1/15/2023,  Time:7:31 AM        Electronically signed by Buck Olvera RN on 1/15/2023 at 7:31 AM

## 2023-01-15 NOTE — PROGRESS NOTES
Called lab regarding the order for BCR/ABL by PCR.  said  lab will draw that test tomorrow due to transport  pickup to outpatient lab needs to be within 24 hours and would have to be done tomorrow. Was informed by  this was a lab draw test. Will inform night shift RN of lab.

## 2023-01-15 NOTE — CONSULTS
Patient seen and examined, consult note dictated. Assessment and Plan:    1- Hyponatremia: Multifactorial and likely secondary to increased free water intake along with decreased solutes. - Continue volume expansion.  - Apply daily free water restriction.  - Monitor serial labs to avoid overcorrection. 2- Hyperkalemia: We will treat medically, stop Lisinopril and apply low potassium diet.   3- PNA: Currently on antibiotic per primary team.

## 2023-01-15 NOTE — CONSULTS
Consult placed    Who:[facility].   Date:1/15/2023,  Time:9:15 AM        Electronically signed by Martin Stevens RN on 1/15/2023 at 9:15 AM

## 2023-01-15 NOTE — PLAN OF CARE
Problem: Safety - Adult  Goal: Free from fall injury  Outcome: Progressing   Encouraged to call for assistance before getting out of bed.  Non skid slipper socks on, call light within reach.  Problem: Chronic Conditions and Co-morbidities  Goal: Patient's chronic conditions and co-morbidity symptoms are monitored and maintained or improved  Outcome: Progressing   Problem: Metabolic/Fluid and Electrolytes - Adult  Goal: Glucose maintained within prescribed range  Outcome: Progressing   Monitor intake and output. Currently on fluid restriction of 1000ml/day.

## 2023-01-15 NOTE — CONSULTS
[facility]    Consult Note      Requesting Physician:  Dr. Rajesh Dong:  Abnormal CBC      HISTORY OF PRESENT ILLNESS:    Mr. Alberto Suresh  is a 65 y.o. male we are seeing in consultation for     ICD-10-CM    1. Septicemia ([address]  A41.9       2. Pneumonia due to infectious organism, unspecified laterality, unspecified part of lung  J18.9       3. Hypoxia  R09.02          This patient was admitted to [facility] on 1/07/2023 with malaise, loss of appetite, cough. A CT the following day showed multifocal pneumonia, right greater than left with associated pleural effusions. A right-sided thoracentesis on 1/07/2023 showed streptococcus. Admit CBC: WBC 16.9 K/mcL, HGB 11.4 g/dL, HCT 34.2%,  K/mcL, ANC 16.2 K/mcL. 1% metamyelocytes. Current CBC: WBC 17.2 K/mcL, HGB 11.0 g/dL, HCT 32.4%, PLT 1038 K/mcL, ANC 14.8 K/mcL. 1% promyelocytes.     Past Medical History:  Past Medical History:   Diagnosis Date    Anxiety     Clostridium difficile infection 03/23/2020    Clostridium difficile infection 03/23/2020    GERD (gastroesophageal reflux disease)     Hypertension        Past Surgical History:  Past Surgical History:   Procedure Laterality Date    ABDOMEN SURGERY      ANKLE SURGERY Right     HAND SURGERY Left     KNEE SURGERY Right     ROTATOR CUFF REPAIR Right        Current Medications:  Current Facility-Administered Medications   Medication Dose Route Frequency Provider Last Rate Last Admin    acetylcysteine (MUCOMYST) 20 % solution 600 mg  600 mg Inhalation Q8H PRN Brain Pereira DO        guaiFENesin (MUCINEX) extended release tablet 600 mg  600 mg Oral BID Daniela Harirs MD   600 mg at 01/15/23 0815    albuterol (PROVENTIL) nebulizer solution 2.5 mg  2.5 mg Nebulization 4x daily Daniela Harris MD   2.5 mg at 01/15/23 0737    lisinopril (PRINIVIL;ZESTRIL) tablet 10 mg 10 mg Oral Daily Brain Vaess, DO   10 mg at 01/15/23 0815    cefTRIAXone (ROCEPHIN) 2,000 mg in dextrose 5 % 50 mL IVPB mini-bag  2,000 mg IntraVENous Q24H Cecy Tejeda MD   Stopped at 01/14/23 1057    lactobacillus (CULTURELLE) capsule 1 capsule  1 capsule Oral BID WC Cecy Tejeda MD   1 capsule at 01/15/23 0815    metroNIDAZOLE (FLAGYL) tablet 500 mg  500 mg Oral 3 times per day Cecy Tejeda MD   500 mg at 01/15/23 0257    aspirin EC tablet 81 mg  81 mg Oral Daily Suman Kirk MD   81 mg at 01/15/23 0815    glucose chewable tablet 16 g  4 tablet Oral PRN Suman Kirk MD        dextrose bolus 10% 125 mL  125 mL IntraVENous PRPASTORA Kirk MD        Or    dextrose bolus 10% 250 mL  250 mL IntraVENous PRN Suman Kirk MD        glucagon (rDNA) injection 1 mg  1 mg SubCUTAneous PRN Suman Kirk MD        dextrose 10 % infusion   IntraVENous Continuous PRPASTORA Kirk MD        sodium chloride flush 0.9 % injection 10 mL  10 mL IntraVENous PRN Suman Kirk MD   10 mL at 01/15/23 0629    0.9 % sodium chloride infusion   IntraVENous PRPASTORA Kirk MD 15 mL/hr at 01/13/23 1022 New Bag at 01/13/23 1022    potassium chloride (KLOR-CON M) extended release tablet 40 mEq  40 mEq Oral PRN Suman Kirk MD        Or    potassium bicarb-citric acid (EFFER-K) effervescent tablet 40 mEq  40 mEq Oral PRN Suman Kirk MD        Or    potassium chloride 10 mEq/100 mL IVPB (Peripheral Line)  10 mEq IntraVENous PRN Suman Kirk MD        magnesium sulfate 1000 mg in dextrose 5% 100 mL IVPB  1,000 mg IntraVENous PRN Suman Kirk MD        enoxaparin (LOVENOX) injection 40 mg  40 mg SubCUTAneous Daily Suman Kirk MD   40 mg at 01/15/23 0815    ondansetron (ZOFRAN-ODT) disintegrating tablet 4 mg  4 mg Oral Q8H PRN Suman Kirk MD        Or    ondansetron [facility]) injection 4 mg  4 mg IntraVENous Q6H PRN Suman Kirk MD        acetaminophen (TYLENOL) tablet 650 mg  650 mg Oral Q6H PRN Alina Serrano MD   650 mg at 01/12/23 2022    Or    acetaminophen (TYLENOL) suppository 650 mg  650 mg Rectal Q6H PRN Alina Serrano MD        nicotine (NICODERM CQ) 14 MG/24HR 1 patch  1 patch TransDERmal Daily Alina Serrano MD        insulin lispro (HUMALOG) injection vial 0-4 Units  0-4 Units SubCUTAneous TID WC Alina Serrano MD   3 Units at 01/08/23 1715    insulin lispro (HUMALOG) injection vial 0-4 Units  0-4 Units SubCUTAneous Nightly Alina Serrano MD        melatonin tablet 3 mg  3 mg Oral Nightly PRN Alina Serrano MD   3 mg at 01/09/23 2354    calcium carbonate (TUMS) chewable tablet 1,000 mg  1,000 mg Oral TID PRN Alina Serrano MD        HYDROcodone-acetaminophen (NORCO) 5-325 MG per tablet 1 tablet  1 tablet Oral Q6H PRN Shahbaz Graves MD   1 tablet at 01/15/23 0815    amphetamine-dextroamphetamine (ADDERALL) tablet 30 mg  30 mg Oral BID AC Shahbaz Graves MD   30 mg at 01/15/23 5786    ipratropium-albuterol (DUONEB) nebulizer solution 1 ampule  1 ampule Inhalation Q4H PRN Shahbaz Graves MD        gabapentin (NEURONTIN) capsule 300 mg  300 mg Oral TID Loren Maria Guadalupe, APRN - CNP   300 mg at 01/15/23 0815    HYDROmorphone (DILAUDID) injection 1 mg  1 mg IntraVENous Q3H PRN Alina Serrano MD   1 mg at 01/15/23 0629    albuterol (PROVENTIL) nebulizer solution 2.5 mg  2.5 mg Nebulization Q4H PRN Alina Serrano MD        benzonatate (TESSALON) capsule 200 mg  200 mg Oral TID PRN Alina Serrano MD   200 mg at 01/14/23 2008       Allergies:  No Known Allergies    Social History:  Social History     Socioeconomic History    Marital status:      Spouse name: Not on file    Number of children: Not on file    Years of education: Not on file    Highest education level: Not on file   Occupational History    Not on file   Tobacco Use    Smoking status: Every Day     Packs/day: 0.50     Types: Cigarettes    Smokeless tobacco: Never Vaping Use    Vaping Use: Never used   Substance and Sexual Activity    Alcohol use: No    Drug use: No    Sexual activity: Not on file   Other Topics Concern    Not on file   Social History Narrative    Not on file     Social Determinants of Health     Financial Resource Strain: Not on file   Food Insecurity: Not on file   Transportation Needs: Not on file   Physical Activity: Not on file   Stress: Not on file   Social Connections: Not on file   Intimate Partner Violence: Not on file   Housing Stability: Not on file          Family History:  History reviewed. No pertinent family history. REVIEW OF SYSTEMS:      Constitutional: Denies fever, sweats, weight loss  Eyes: No visual changes or diplopia. No scleral icterus. Ent: No headaches, hearing loss or vertigo. No mouth sores or sore throat. Cardiovascular: No chest pain, dyspnea on exertion, palpitations or loss of consciousness. Respiratory: No cough or wheezing, no sputum production. No hemoptysis. Gastrointestinal: No abdominal pain, appetite loss, blood in stools. No change in bowel habits. Genitourinary: No dysuria, trouble voiding, or hematuria. Musculoskeletal: No generalized weakness. No joint complaints. Integumentary: No rash or pruritus. Neurological: No headache, diplopia. No change in gait, balance, or coordination. No paresthesias. Endocrine: No temperature intolerance. No excessive thirst, fluid intake, urination. Hematologic/lymphatic: No abnormal bruising or ecchymosis, blood clots or swollen lymph nodes. Allergic/immunologic: No nasal congestion or hives.         PHYSICAL EXAM:      Vitals:  Patient Vitals for the past 24 hrs:   BP Temp Temp src Pulse Resp SpO2 Weight   01/15/23 0808 134/80 98.4 °F (36.9 °C) Oral 86 18 94 % --   01/15/23 0740 -- -- -- -- -- 93 % --   01/15/23 0615 -- -- -- -- -- -- 150 lb 1.6 oz (68.1 kg)   01/15/23 0257 137/79 -- -- 99 -- 94 % --   01/14/23 2005 133/81 98.8 °F (37.1 °C) Oral 91 16 98 % -- 01/14/23 1948 -- -- -- -- 18 94 % --   01/14/23 1913 -- -- -- -- 16 -- --   01/14/23 1535 131/76 98.6 °F (37 °C) Oral 84 18 93 % --   01/14/23 1518 -- -- -- -- -- 93 % --   01/14/23 1147 132/78 98.2 °F (36.8 °C) Oral 87 18 92 % --   01/14/23 1119 -- -- -- -- -- 90 % --           CONSTITUTIONAL: awake, alert, cooperative, no apparent distress   EYES: pupils equal, round and reactive to light, sclera clear and conjunctiva normal  ENT: Normocephalic, without obvious abnormality, atraumatic  NECK: supple, symmetrical, no jugular venous distension and no carotid bruits   HEMATOLOGIC/LYMPHATIC: no cervical, supraclavicular or axillary lymphadenopathy   LUNGS: no increased work of breathing and clear to auscultation   CARDIOVASCULAR: regular rate and rhythm, normal S1 and S2, no murmur noted  ABDOMEN: normal bowel sounds x 4, soft, non-distended, non-tender, no masses palpated, no hepatosplenomegaly   MUSCULOSKELETAL: full range of motion noted, tone is normal  NEUROLOGIC: awake, alert, oriented to name, place and time. Motor skills grossly intact. SKIN: Normal skin color, texture, turgor and no jaundice. appears intact   EXTREMITIES: no LE edema       DATA:    PT/INR:    Recent Labs     01/07/23  2240   INR 1.32*     PTT:  No results for input(s): APTT in the last 720 hours.     CMP:    Recent Labs     01/15/23  0428   *   K 5.7*   CL 90*   CO2 27   BUN 13     Mg:    Recent Labs     01/10/23  0736 01/09/23  0659 01/08/23  0558   MG 2.40 2.20 2.10       Lab Results   Component Value Date    CALCIUM 7.9 (L) 01/15/2023       CBC:    Recent Labs     01/15/23  0428 01/14/23  0614 01/13/23  0648 01/12/23  0621 01/11/23  0644   WBC 17.2* 16.9* 22.9* 21.9* 16.8*   NEUTROABS 14.8* 11.8* 19.2* 19.1* 15.1*   LYMPHOPCT 6.0 11.0 9.0 10.0 5.0   RBC 3.60* 3.50* 3.71* 3.61* 3.75*   HGB 11.0* 10.3* 11.3* 11.0* 11.3*   HCT 32.4* 31.5* 33.5* 32.7* 33.9*   MCV 89.9 90.2 90.2 90.7 90.6   MCH 30.4 29.6 30.5 30.5 30.1   MCHC 33.8 32.8 33.8 33.6 33.2   RDW 14.2 14.1 14.1 14.5 14.6   PLT 1,038* 884* 764* 652* 511*        LDH:No results for input(s): LDH in the last 720 hours. Radiology Review:  CT CHEST WO CONTRAST  Narrative: EXAMINATION:  CT OF THE CHEST WITHOUT CONTRAST 1/13/2023 3:05 pm    TECHNIQUE:  CT of the chest was performed without the administration of intravenous  contrast. Multiplanar reformatted images are provided for review. Automated  exposure control, iterative reconstruction, and/or weight based adjustment of  the mA/kV was utilized to reduce the radiation dose to as low as reasonably  achievable. COMPARISON:  Chest radiograph 1/13/2023, chest CT 1/11/2023    HISTORY:  ORDERING SYSTEM PROVIDED HISTORY: Extension of pleural effusion  TECHNOLOGIST PROVIDED HISTORY:  Reason for exam:->Extension of pleural effusion  Reason for Exam: CP, SOB. coughing up a small amount of blood,. Relevant Medical/Surgical History: c-diff, HTN. .. FINDINGS:  Mediastinum: Similar mildly enlarged mediastinal lymph nodes. No definite  new lymphadenopathy is seen. Small volume pericardial fluid. Ectatic  ascending thoracic aorta, measuring 4.3 cm in diameter. Dilated main  pulmonary artery, measuring 3.7 cm in diameter. Lungs/pleura: Previously seen intraluminal contents within the right lower  lobe bronchus are less conspicuous on the current exam.  Similar appearance  of a small to moderate-sized right pleural effusion. Similar appearance of a  small left pleural effusion. No pneumothorax is seen. Again seen are  extensive airspace opacities in the lungs, right greater than left,  compatible with infection. Similar 1.4 cm solid left lower lobe pulmonary  nodule. Upper Abdomen: Limited images of the upper abdomen demonstrate no obvious  acute abnormality. Small hiatal hernia. Soft Tissues/Bones: No acute bony abnormality is seen. Impression: Similar small to moderate right and small left pleural effusions.     Again seen are bilateral airspace opacities, right greater than left,  compatible with infection. Similar appearance of a 1.4 cm left lower lobe pulmonary nodule. Similar mild mediastinal lymphadenopathy. Similar ectasia of the ascending thoracic aorta and mild dilation of the main  pulmonary artery. RECOMMENDATIONS:  Recommend a short-term follow-up chest CT after resolution of acute symptoms,  to further assess the indeterminate left lower lobe pulmonary nodule. XR CHEST PORTABLE  Narrative: EXAMINATION:  ONE XRAY VIEW OF THE CHEST    1/13/2023 7:53 am    COMPARISON:  01/12/2023 radiograph    HISTORY:  ORDERING SYSTEM PROVIDED HISTORY: Pneumonia  TECHNOLOGIST PROVIDED HISTORY:  Reason for exam:->Pneumonia  Reason for Exam: pneumonia    FINDINGS:  The heart, mediastinum and pulmonary vascularity are normal.  Dense airspace  opacification is present in the right mid and lower lung zone stable from  prior exam.  Left lung clear. No significant skeletal finding. Impression: Relatively stable pattern of pneumonia in the right lung. Problem List  Patient Active Problem List   Diagnosis    Hypoxia    Bronchitis    CAP (community acquired pneumonia) due to Chlamydia species    DM2 (diabetes mellitus, type 2) ([facility] [address]    Smoker    ADHD    History of sleeve gastrectomy    Pneumonia of right lower lobe due to infectious organism    Pneumonia of right lower lobe due to Streptococcus pneumoniae (HCC)    Empyema (HCC)    Leukocytosis    Bandemia    Thrombocytosis    Normocytic normochromic anemia    Uncontrolled hypertension    Elevated BUN    Elevated procalcitonin    Pleuritic chest pain    Hemoptysis    Bacteremia due to Streptococcus       IMPRESSION/RECOMMENDATIONS:    1.) Abnormal CBC  - Occurring in the setting of multifocal pneumonia with empyema.  - Admit CBC: WBC 16.9 K/mcL, HGB 11.4 g/dL, HCT 34.2%,  K/mcL, ANC 16.2 K/mcL. 1% metamyelocytes.   - Current CBC: WBC 17.2 K/mcL, HGB 11.0 g/dL, HCT 32.4%, PLT 1038 K/mcL, ANC 14.8 K/mcL. 1% promyelocytes. A good rule of thumb is that a cell more immature than a myelocyte (such as the promyelocyte that prompted the consultation) in the peripheral blood is abnormal by definition. On the other hand, I suspect that it is no coincidence that this is occurring in the setting of multifocal pneumonia with empyema. This is the likely cause of the immature WBC precursors and elevated PLTs. Will check JAK2 V617F, PCR for BCR/ABL, ESR, CRP. I recognize the PLTs are quite elevated. Assuming this is reactive (likely the case) then no particular therapy is required aside from treatment of the underlying cause. Thank you for asking me to see the patient.        Mayelin Vidal MD  Please Contact Through [employer]
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
## 2023-01-16 ENCOUNTER — APPOINTMENT (OUTPATIENT)
Dept: CT IMAGING | Age: 63
DRG: 871 | End: 2023-01-16
Payer: MEDICARE

## 2023-01-16 PROBLEM — R59.0 MEDIASTINAL ADENOPATHY: Status: ACTIVE | Noted: 2023-01-16

## 2023-01-16 PROBLEM — B95.3 BACTEREMIA DUE TO STREPTOCOCCUS PNEUMONIAE: Status: ACTIVE | Noted: 2023-01-10

## 2023-01-16 PROBLEM — R91.1 LUNG NODULE: Status: ACTIVE | Noted: 2023-01-16

## 2023-01-16 PROBLEM — E87.1 HYPONATREMIA: Status: ACTIVE | Noted: 2023-01-16

## 2023-01-16 PROBLEM — A41.9 SEPTICEMIA (HCC): Status: ACTIVE | Noted: 2023-01-16

## 2023-01-16 LAB
ANION GAP SERPL CALCULATED.3IONS-SCNC: 5 MMOL/L (ref 3–16)
ANION GAP SERPL CALCULATED.3IONS-SCNC: 6 MMOL/L (ref 3–16)
ANION GAP SERPL CALCULATED.3IONS-SCNC: 7 MMOL/L (ref 3–16)
ANION GAP SERPL CALCULATED.3IONS-SCNC: 8 MMOL/L (ref 3–16)
BANDED NEUTROPHILS RELATIVE PERCENT: 5 % (ref 0–7)
BASOPHILS ABSOLUTE: 0 K/UL (ref 0–0.2)
BASOPHILS RELATIVE PERCENT: 0 %
BUN BLDV-MCNC: 12 MG/DL (ref 7–20)
BUN BLDV-MCNC: 12 MG/DL (ref 7–20)
BUN BLDV-MCNC: 15 MG/DL (ref 7–20)
BUN BLDV-MCNC: 16 MG/DL (ref 7–20)
CALCIUM SERPL-MCNC: 7.9 MG/DL (ref 8.3–10.6)
CALCIUM SERPL-MCNC: 7.9 MG/DL (ref 8.3–10.6)
CALCIUM SERPL-MCNC: 8.1 MG/DL (ref 8.3–10.6)
CALCIUM SERPL-MCNC: 8.1 MG/DL (ref 8.3–10.6)
CHLORIDE BLD-SCNC: 96 MMOL/L (ref 99–110)
CHLORIDE BLD-SCNC: 99 MMOL/L (ref 99–110)
CO2: 26 MMOL/L (ref 21–32)
CO2: 28 MMOL/L (ref 21–32)
CREAT SERPL-MCNC: 0.6 MG/DL (ref 0.8–1.3)
CREAT SERPL-MCNC: 0.6 MG/DL (ref 0.8–1.3)
CREAT SERPL-MCNC: <0.5 MG/DL (ref 0.8–1.3)
CREAT SERPL-MCNC: <0.5 MG/DL (ref 0.8–1.3)
EOSINOPHILS ABSOLUTE: 0.3 K/UL (ref 0–0.6)
EOSINOPHILS RELATIVE PERCENT: 2 %
GFR SERPL CREATININE-BSD FRML MDRD: >60 ML/MIN/{1.73_M2}
GLUCOSE BLD-MCNC: 101 MG/DL (ref 70–99)
GLUCOSE BLD-MCNC: 113 MG/DL (ref 70–99)
GLUCOSE BLD-MCNC: 131 MG/DL (ref 70–99)
GLUCOSE BLD-MCNC: 144 MG/DL (ref 70–99)
GLUCOSE BLD-MCNC: 156 MG/DL (ref 70–99)
GLUCOSE BLD-MCNC: 88 MG/DL (ref 70–99)
GLUCOSE BLD-MCNC: 89 MG/DL (ref 70–99)
GLUCOSE BLD-MCNC: 95 MG/DL (ref 70–99)
HCT VFR BLD CALC: 29.9 % (ref 40.5–52.5)
HEMATOLOGY PATH CONSULT: NO
HEMATOLOGY PATH CONSULT: NORMAL
HEMOGLOBIN: 10.2 G/DL (ref 13.5–17.5)
LYMPHOCYTES ABSOLUTE: 1.8 K/UL (ref 1–5.1)
LYMPHOCYTES RELATIVE PERCENT: 12 %
MCH RBC QN AUTO: 30.7 PG (ref 26–34)
MCHC RBC AUTO-ENTMCNC: 34 G/DL (ref 31–36)
MCV RBC AUTO: 90.3 FL (ref 80–100)
METAMYELOCYTES RELATIVE PERCENT: 1 %
MONOCYTES ABSOLUTE: 1.5 K/UL (ref 0–1.3)
MONOCYTES RELATIVE PERCENT: 10 %
NEUTROPHILS ABSOLUTE: 11.1 K/UL (ref 1.7–7.7)
NEUTROPHILS RELATIVE PERCENT: 70 %
PDW BLD-RTO: 14.1 % (ref 12.4–15.4)
PERFORMED ON: ABNORMAL
PERFORMED ON: NORMAL
PLATELET # BLD: 1138 K/UL (ref 135–450)
PLATELET SLIDE REVIEW: ABNORMAL
PMV BLD AUTO: 7.6 FL (ref 5–10.5)
POTASSIUM SERPL-SCNC: 4.4 MMOL/L (ref 3.5–5.1)
POTASSIUM SERPL-SCNC: 4.7 MMOL/L (ref 3.5–5.1)
POTASSIUM SERPL-SCNC: 4.9 MMOL/L (ref 3.5–5.1)
POTASSIUM SERPL-SCNC: 5 MMOL/L (ref 3.5–5.1)
RBC # BLD: 3.31 M/UL (ref 4.2–5.9)
SLIDE REVIEW: ABNORMAL
SODIUM BLD-SCNC: 128 MMOL/L (ref 136–145)
SODIUM BLD-SCNC: 129 MMOL/L (ref 136–145)
SODIUM BLD-SCNC: 130 MMOL/L (ref 136–145)
SODIUM BLD-SCNC: 132 MMOL/L (ref 136–145)
WBC # BLD: 14.6 K/UL (ref 4–11)

## 2023-01-16 PROCEDURE — 85025 COMPLETE CBC W/AUTO DIFF WBC: CPT

## 2023-01-16 PROCEDURE — 6370000000 HC RX 637 (ALT 250 FOR IP): Performed by: NURSE PRACTITIONER

## 2023-01-16 PROCEDURE — 81206 BCR/ABL1 GENE MAJOR BP: CPT

## 2023-01-16 PROCEDURE — 6370000000 HC RX 637 (ALT 250 FOR IP): Performed by: INTERNAL MEDICINE

## 2023-01-16 PROCEDURE — 6360000002 HC RX W HCPCS: Performed by: INTERNAL MEDICINE

## 2023-01-16 PROCEDURE — 1200000000 HC SEMI PRIVATE

## 2023-01-16 PROCEDURE — 99232 SBSQ HOSP IP/OBS MODERATE 35: CPT | Performed by: INTERNAL MEDICINE

## 2023-01-16 PROCEDURE — 81207 BCR/ABL1 GENE MINOR BP: CPT

## 2023-01-16 PROCEDURE — 80048 BASIC METABOLIC PNL TOTAL CA: CPT

## 2023-01-16 PROCEDURE — 2580000003 HC RX 258: Performed by: INTERNAL MEDICINE

## 2023-01-16 PROCEDURE — 36415 COLL VENOUS BLD VENIPUNCTURE: CPT

## 2023-01-16 PROCEDURE — 81208 BCR/ABL1 GENE OTHER BP: CPT

## 2023-01-16 PROCEDURE — 71250 CT THORAX DX C-: CPT

## 2023-01-16 RX ORDER — HYDROCODONE BITARTRATE AND ACETAMINOPHEN 5; 325 MG/1; MG/1
1 TABLET ORAL EVERY 6 HOURS PRN
Status: DISCONTINUED | OUTPATIENT
Start: 2023-01-16 | End: 2023-01-17

## 2023-01-16 RX ADMIN — VASOPRESSIN: 20 INJECTION, SOLUTION INTRAVENOUS at 19:47

## 2023-01-16 RX ADMIN — HYDROMORPHONE HYDROCHLORIDE 1 MG: 1 INJECTION, SOLUTION INTRAMUSCULAR; INTRAVENOUS; SUBCUTANEOUS at 09:41

## 2023-01-16 RX ADMIN — Medication 10 ML: at 06:25

## 2023-01-16 RX ADMIN — GUAIFENESIN 600 MG: 600 TABLET, EXTENDED RELEASE ORAL at 19:44

## 2023-01-16 RX ADMIN — Medication 1 CAPSULE: at 09:40

## 2023-01-16 RX ADMIN — ASPIRIN 81 MG: 81 TABLET, COATED ORAL at 09:40

## 2023-01-16 RX ADMIN — SODIUM ZIRCONIUM CYCLOSILICATE 10 G: 5 POWDER, FOR SUSPENSION ORAL at 09:41

## 2023-01-16 RX ADMIN — HYDROMORPHONE HYDROCHLORIDE 1 MG: 1 INJECTION, SOLUTION INTRAMUSCULAR; INTRAVENOUS; SUBCUTANEOUS at 06:24

## 2023-01-16 RX ADMIN — GABAPENTIN 300 MG: 300 CAPSULE ORAL at 09:40

## 2023-01-16 RX ADMIN — GABAPENTIN 300 MG: 300 CAPSULE ORAL at 19:44

## 2023-01-16 RX ADMIN — VASOPRESSIN: 20 INJECTION, SOLUTION INTRAVENOUS at 03:04

## 2023-01-16 RX ADMIN — GABAPENTIN 300 MG: 300 CAPSULE ORAL at 14:56

## 2023-01-16 RX ADMIN — Medication 10 ML: at 02:39

## 2023-01-16 RX ADMIN — DEXTROAMPHETAMINE SACCHARATE, AMPHETAMINE ASPARTATE, DEXTROAMPHETAMINE SULFATE, AMPHETAMINE SULFATE TABLETS, 10 MG,CLL 30 MG: 2.5; 2.5; 2.5; 2.5 TABLET ORAL at 14:56

## 2023-01-16 RX ADMIN — ENOXAPARIN SODIUM 40 MG: 100 INJECTION SUBCUTANEOUS at 09:41

## 2023-01-16 RX ADMIN — CEFTRIAXONE 2000 MG: 2 INJECTION, POWDER, FOR SOLUTION INTRAMUSCULAR; INTRAVENOUS at 09:40

## 2023-01-16 RX ADMIN — HYDROMORPHONE HYDROCHLORIDE 1 MG: 1 INJECTION, SOLUTION INTRAMUSCULAR; INTRAVENOUS; SUBCUTANEOUS at 02:39

## 2023-01-16 RX ADMIN — GUAIFENESIN 600 MG: 600 TABLET, EXTENDED RELEASE ORAL at 09:41

## 2023-01-16 RX ADMIN — HYDROCODONE BITARTRATE AND ACETAMINOPHEN 1 TABLET: 5; 325 TABLET ORAL at 12:57

## 2023-01-16 RX ADMIN — HYDROCODONE BITARTRATE AND ACETAMINOPHEN 1 TABLET: 5; 325 TABLET ORAL at 19:44

## 2023-01-16 RX ADMIN — Medication 1 CAPSULE: at 18:06

## 2023-01-16 ASSESSMENT — PAIN DESCRIPTION - DESCRIPTORS
DESCRIPTORS: DISCOMFORT;BURNING
DESCRIPTORS: SHOOTING

## 2023-01-16 ASSESSMENT — PAIN DESCRIPTION - ORIENTATION
ORIENTATION: RIGHT;LEFT
ORIENTATION: RIGHT
ORIENTATION: RIGHT;LEFT
ORIENTATION: RIGHT;LEFT

## 2023-01-16 ASSESSMENT — PAIN - FUNCTIONAL ASSESSMENT
PAIN_FUNCTIONAL_ASSESSMENT: PREVENTS OR INTERFERES SOME ACTIVE ACTIVITIES AND ADLS
PAIN_FUNCTIONAL_ASSESSMENT: PREVENTS OR INTERFERES SOME ACTIVE ACTIVITIES AND ADLS

## 2023-01-16 ASSESSMENT — PAIN DESCRIPTION - LOCATION
LOCATION: BACK
LOCATION: BACK
LOCATION: CHEST
LOCATION: BACK;SHOULDER
LOCATION: CHEST;BACK
LOCATION: CHEST

## 2023-01-16 ASSESSMENT — PAIN SCALES - GENERAL
PAINLEVEL_OUTOF10: 9
PAINLEVEL_OUTOF10: 9
PAINLEVEL_OUTOF10: 6
PAINLEVEL_OUTOF10: 6
PAINLEVEL_OUTOF10: 9
PAINLEVEL_OUTOF10: 8
PAINLEVEL_OUTOF10: 9
PAINLEVEL_OUTOF10: 10

## 2023-01-16 ASSESSMENT — PAIN DESCRIPTION - FREQUENCY: FREQUENCY: CONTINUOUS

## 2023-01-16 ASSESSMENT — PAIN DESCRIPTION - PAIN TYPE: TYPE: ACUTE PAIN

## 2023-01-16 ASSESSMENT — PAIN DESCRIPTION - ONSET: ONSET: ON-GOING

## 2023-01-16 NOTE — RT PROTOCOL NOTE
RT Inhaler-Nebulizer Bronchodilator Protocol Note    There is a bronchodilator order in the chart from a provider indicating to follow the RT Bronchodilator Protocol and there is an Initiate RT Inhaler-Nebulizer Bronchodilator Protocol order as well (see protocol at bottom of note). CXR Findings:  No results found. The findings from the last RT Protocol Assessment were as follows:   History Pulmonary Disease: Smoker 15 pack years or more  Respiratory Pattern: Regular pattern and RR 12-20 bpm  Breath Sounds: Clear breath sounds  Cough: Strong, spontaneous, non-productive  Indication for Bronchodilator Therapy: None  Bronchodilator Assessment Score: 1    Aerosolized bronchodilator medication orders have been revised according to the RT Inhaler-Nebulizer Bronchodilator Protocol below. Respiratory Therapist to perform RT Therapy Protocol Assessment initially then follow the protocol. Repeat RT Therapy Protocol Assessment PRN for score 0-3 or on second treatment, BID, and PRN for scores above 3. No Indications - adjust the frequency to every 6 hours PRN wheezing or bronchospasm, if no treatments needed after 48 hours then discontinue using Per Protocol order mode. If indication present, adjust the RT bronchodilator orders based on the Bronchodilator Assessment Score as indicated below. Use Inhaler orders unless patient has one or more of the following: on home nebulizer, not able to hold breath for 10 seconds, is not alert and oriented, cannot activate and use MDI correctly, or respiratory rate 25 breaths per minute or more, then use the equivalent nebulizer order(s) with same Frequency and PRN reasons based on the score. If a patient is on this medication at home then do not decrease Frequency below that used at home.     0-3 - enter or revise RT bronchodilator order(s) to equivalent RT Bronchodilator order with Frequency of every 4 hours PRN for wheezing or increased work of breathing using Per Protocol order mode. 4-6 - enter or revise RT Bronchodilator order(s) to two equivalent RT bronchodilator orders with one order with BID Frequency and one order with Frequency of every 4 hours PRN wheezing or increased work of breathing using Per Protocol order mode. 7-10 - enter or revise RT Bronchodilator order(s) to two equivalent RT bronchodilator orders with one order with TID Frequency and one order with Frequency of every 4 hours PRN wheezing or increased work of breathing using Per Protocol order mode. 11-13 - enter or revise RT Bronchodilator order(s) to one equivalent RT bronchodilator order with QID Frequency and an Albuterol order with Frequency of every 4 hours PRN wheezing or increased work of breathing using Per Protocol order mode. Greater than 13 - enter or revise RT Bronchodilator order(s) to one equivalent RT bronchodilator order with every 4 hours Frequency and an Albuterol order with Frequency of every 2 hours PRN wheezing or increased work of breathing using Per Protocol order mode. RT to enter RT Home Evaluation for COPD & MDI Assessment order using Per Protocol order mode.     Electronically signed by Flaca Ayon RCP on 1/15/2023 at 8:49 PM

## 2023-01-16 NOTE — CARE COORDINATION
Chart reviewed day #9. Per chart review and RN, patient septic. Nephro, Pulm and ID following. Patient is getting IV antibiotics and fluids. Patient is from home with his son and was independent prior to admission. Spoke with patient at bedside for check in. He states he feels a little weaker than usual, but other than that, feels pretty good and looking forward to getting home. CM will continue to follow should any needs arise.

## 2023-01-16 NOTE — PLAN OF CARE
Problem: Pain  Goal: Verbalizes/displays adequate comfort level or baseline comfort level  Outcome: Progressing  Note: Pt reporting   8/10 pain during pain assessment. Treating pain prn. Pt reports current pain plan is working well. Instructed pt to report any new onsets of pain. Will continue to monitor. Problem: Metabolic/Fluid and Electrolytes - Adult  Goal: Glucose maintained within prescribed range  1/16/2023 0229 by Shawn Mitchell RN  Outcome: Progressing  Note: Pt at risk for elevated blood glucose levels r/t DM. Pt will have glucose levels monitored prior to breakfast, lunch, dinner, and bedtime. Elevated levels will be treated via SSI. Pt understands the need to monitor levels and has no further complaints at this time.

## 2023-01-16 NOTE — PLAN OF CARE
Problem: Pain  Goal: Verbalizes/displays adequate comfort level or baseline comfort level  1/16/2023 1356 by Jovita Mancera RN  Outcome: Progressing   Patient able to rate pain on a scale 0-10. PRN medications available and administered as needed.

## 2023-01-16 NOTE — PROGRESS NOTES
Lab called to draw BCR/ABL by PCR. They stated they dont see order but will have a phlebotomist come to the floor and draw.  Labels printed and awaiting draw

## 2023-01-16 NOTE — PROGRESS NOTES
[facility] Progress Note       CC: hyponatremia  HPI   The patient is a 20-year-old  male  patient who presented to [facility] complaining of cough  and shortness of breath. The patient was diagnosed with pneumonia and  his renal panel revealed hyponatremia with a serum sodium of 124 mEq /l, which prompted Nephrology consultation.     SUBJECTIVE  No CP or SOB  SOC: no visitors      Scheduled Meds:   [START ON 1/17/2023] albuterol  2.5 mg Nebulization Daily    guaiFENesin  600 mg Oral BID    cefTRIAXone (ROCEPHIN) IV  2,000 mg IntraVENous Q24H    lactobacillus  1 capsule Oral BID WC    metroNIDAZOLE  500 mg Oral 3 times per day    aspirin  81 mg Oral Daily    enoxaparin  40 mg SubCUTAneous Daily    nicotine  1 patch TransDERmal Daily    insulin lispro  0-4 Units SubCUTAneous TID WC    insulin lispro  0-4 Units SubCUTAneous Nightly    amphetamine-dextroamphetamine  30 mg Oral BID AC    gabapentin  300 mg Oral TID     Continuous Infusions:   IV infusion builder 75 mL/hr at 01/16/23 0304    dextrose      sodium chloride 15 mL/hr at 01/13/23 1022     PRN Meds:acetylcysteine, glucose, dextrose bolus **OR** dextrose bolus, glucagon (rDNA), dextrose, sodium chloride flush, sodium chloride, potassium chloride **OR** potassium alternative oral replacement **OR** potassium chloride, magnesium sulfate, ondansetron **OR** ondansetron, acetaminophen **OR** acetaminophen, melatonin, calcium carbonate, HYDROcodone 5 mg - acetaminophen, ipratropium-albuterol, HYDROmorphone, albuterol, benzonatate      Objective:      Physical Exam  Wt Readings from Last 3 Encounters:   01/16/23 150 lb 8 oz (68.3 kg)   04/18/22 160 lb (72.6 kg)   07/23/21 250 lb (113.4 kg)     Temp Readings from Last 3 Encounters:   01/16/23 98.1 °F (36.7 °C) (Oral)   04/18/22 98.1 °F (36.7 °C)   07/23/21 98.8 °F (37.1 °C) (Oral)     BP Readings from Last 3 Encounters:   01/16/23 (!) 134/92   04/18/22 (!) 148/82 07/23/21 (!) 146/96     Pulse Readings from Last 3 Encounters:   01/16/23 89   04/18/22 70   07/23/21 92    GENERAL APPEARANCE:  The patient is alert and oriented x3, not in acute  distress. LUNGS:  Clear to anterior auscultation bilaterally, nonlabored  breathing. CARDIOVASCULAR EXAM:   S1 and S2, regular rate and rhythm. No  added murmurs or rubs. No peripheral edema. ABDOMINAL EXAM:  soft abdomen. No organomegaly. SKIN:  no lesions or rashes. Warm to touch. L[place] Snare LYMPHATICS: no cervical or axillary adenopathies. Lab Review   Lab Results   Component Value Date    WBC 14.6 (H) 01/16/2023    HGB 10.2 (L) 01/16/2023    HCT 29.9 (L) 01/16/2023    MCV 90.3 01/16/2023    PLT 1,138 (HH) 01/16/2023     Lab Results   Component Value Date/Time     01/16/2023 06:20 AM    K 4.9 01/16/2023 06:20 AM    K 4.3 03/23/2020 12:30 PM    CL 96 01/16/2023 06:20 AM    CO2 26 01/16/2023 06:20 AM    BUN 12 01/16/2023 06:20 AM    CREATININE <0.5 01/16/2023 06:20 AM    GLUCOSE 95 01/16/2023 06:20 AM    CALCIUM 8.1 01/16/2023 06:20 AM          Patient Active Problem List    Diagnosis Date Noted    Bandemia 01/10/2023    Thrombocytosis 01/10/2023    Normocytic normochromic anemia 01/10/2023    Uncontrolled hypertension 01/10/2023    Elevated BUN 01/10/2023    Elevated procalcitonin 01/10/2023    Pleuritic chest pain 01/10/2023    Hemoptysis 01/10/2023    Bacteremia due to Streptococcus 01/10/2023    Pneumonia of right lower lobe due to Streptococcus pneumoniae ([facility] [address] 01/09/2023    Empyema ([facility] [address] 01/09/2023    Leukocytosis 01/09/2023    DM2 (diabetes mellitus, type 2) ([facility] [address] 01/07/2023    Smoker 01/07/2023    ADHD 01/07/2023    History of sleeve gastrectomy 01/07/2023    Pneumonia of right lower lobe due to infectious organism 01/07/2023    Hypoxia 12/13/2017    Bronchitis 12/13/2017    CAP (community acquired pneumonia) due to Chlamydia species 12/13/2017       ASSESSMENT AND PLAN   1.   Hyponatremia, multifactorial and likely secondary to increased free  water intake along with decreased solids.  -Na trending up   2. Hyperkalemia  -stopped the  lisinopril   -low potassium diet.   3.  Pneumonia, currently on antibiotics per primary team.

## 2023-01-16 NOTE — PROGRESS NOTES
Hospitalist Progress Note      PCP: Lorenzo Peabody, MD    Date of Admission: 1/7/2023    Chief Complaint: Fatigue    Hospital Course:   Ashok Rivera is a 65 y.o. male. He presented to the eR from home. He c/o feeling ill for about a week. He describes feeling constitutionally ill w/ fatigue, malaise, loss of appetite. Along the way he has had a worsening cough, worsening dyspnea, and pretty marked right-sided pleuritic chest pain along the lateral and anterolater right costal margin. CXR with mild central pulmonary congestion. Small right pleural effusion. Thoracentesis with 300 ml fluid removed. Subjective:  Continued congestion and pain in right lung base.  Improvement with vest. No chest pain       Medications:  Reviewed    Infusion Medications    IV infusion builder 75 mL/hr at 01/16/23 0304    dextrose      sodium chloride 15 mL/hr at 01/13/23 1022     Scheduled Medications    [START ON 1/17/2023] albuterol  2.5 mg Nebulization Daily    guaiFENesin  600 mg Oral BID    cefTRIAXone (ROCEPHIN) IV  2,000 mg IntraVENous Q24H    lactobacillus  1 capsule Oral BID WC    aspirin  81 mg Oral Daily    enoxaparin  40 mg SubCUTAneous Daily    nicotine  1 patch TransDERmal Daily    insulin lispro  0-4 Units SubCUTAneous TID WC    insulin lispro  0-4 Units SubCUTAneous Nightly    amphetamine-dextroamphetamine  30 mg Oral BID AC    gabapentin  300 mg Oral TID     PRN Meds: HYDROcodone 5 mg - acetaminophen, acetylcysteine, glucose, dextrose bolus **OR** dextrose bolus, glucagon (rDNA), dextrose, sodium chloride flush, sodium chloride, potassium chloride **OR** potassium alternative oral replacement **OR** potassium chloride, magnesium sulfate, ondansetron **OR** ondansetron, acetaminophen **OR** acetaminophen, melatonin, calcium carbonate, ipratropium-albuterol, albuterol, benzonatate      Intake/Output Summary (Last 24 hours) at 1/16/2023 1514  Last data filed at 1/16/2023 1256  Gross per 24 hour   Intake 1579 ml   Output 2310 ml   Net -731 ml       Physical Exam Performed:    /87   Pulse 88   Temp 98.1 °F (36.7 °C) (Oral)   Resp 20   Ht 5' 8\" (1.727 m)   Wt 150 lb 8 oz (68.3 kg)   SpO2 95%   BMI 22.88 kg/m²     General appearance: No apparent distress, appears stated age and cooperative. HEENT: Pupils equal, round, and reactive to light. Conjunctivae/corneas clear. Neck: Supple, with full range of motion. No jugular venous distention. Trachea midline. Respiratory:  Normal respiratory effort. Crackles in right lung base with some improvement   Cardiovascular: Regular rate and rhythm with normal S1/S2 without murmurs, rubs or gallops. Abdomen: Soft, non-tender, non-distended with normal bowel sounds. Musculoskeletal: No clubbing, cyanosis or edema bilaterally. Full range of motion without deformity. Skin: Skin color, texture, turgor normal.  No rashes or lesions. Neurologic:  Neurovascularly intact without any focal sensory/motor deficits. Cranial nerves: II-XII intact, grossly non-focal.  Psychiatric: Alert and oriented, thought content appropriate, normal insight  Capillary Refill: Brisk, 3 seconds, normal   Peripheral Pulses: +2 palpable, equal bilaterally       Labs:   Recent Labs     01/14/23  0614 01/15/23  0428 01/16/23  0620   WBC 16.9* 17.2* 14.6*   HGB 10.3* 11.0* 10.2*   HCT 31.5* 32.4* 29.9*   * 1,038* 1,138*     Recent Labs     01/15/23  2342 01/16/23  0620 01/16/23  1132   * 128* 130*   K 4.7 4.9 4.4   CL 99 96* 96*   CO2 26 26 26   BUN 16 12 12   CREATININE 0.6* <0.5* <0.5*   CALCIUM 7.9* 8.1* 7.9*     No results for input(s): AST, ALT, BILIDIR, BILITOT, ALKPHOS in the last 72 hours. No results for input(s): INR in the last 72 hours. No results for input(s): Paige Kelley in the last 72 hours.       Urinalysis:      Lab Results   Component Value Date/Time    NITRU Negative 07/23/2021 04:28 PM    [address] 0-2 07/23/2021 04:28 PM    RBCUA 3-4 07/23/2021 04:28 PM BLOODU Negative 07/23/2021 04:28 PM    SPECGRAV >=1.030 07/23/2021 04:28 PM    GLUCOSEU Negative 07/23/2021 04:28 PM       Radiology:  CT CHEST WO CONTRAST   Final Result   Similar small to moderate right and small left pleural effusions. Again seen are bilateral airspace opacities, right greater than left,   compatible with infection. Similar appearance of a 1.4 cm left lower lobe pulmonary nodule. Similar mild mediastinal lymphadenopathy. Similar ectasia of the ascending thoracic aorta and mild dilation of the main   pulmonary artery. RECOMMENDATIONS:   Recommend a short-term follow-up chest CT after resolution of acute symptoms,   to further assess the indeterminate left lower lobe pulmonary nodule. XR CHEST PORTABLE   Final Result   Relatively stable pattern of pneumonia in the right lung. XR CHEST PORTABLE   Final Result   Slightly improved aeration in the lungs from prior exam.  Dense airspace   changes persist in the right lower lung zone with a small pleural effusion. CT CHEST WO CONTRAST   Final Result   Moderate bilateral pleural effusions, slightly increased as compared to   prior. Partial atelectasis of the posterior left lower lobe. Multifocal bilateral airspace disease, improving within the lingula and right   lower lobe though increasing within the right upper lobe as compared to   prior. Increased size of an AP window lymph node, likely reactive. Filling defects within the bronchus intermedius and left lower lobe bronchi,   which can be due to mucoid impaction or aspiration. Indeterminate 1.4 cm left lower lobe pulmonary nodule again noted, for which   short-term follow-up chest CT after treatment and resolution of any acute   symptoms is recommended to ensure resolution. If this does not resolve, it   is of a size amenable to evaluation by PET-CT. XR CHEST (2 VW)   Final Result   Bilateral pneumonias without any improvement. Follow-up advised. CT CHEST W CONTRAST   Final Result   Multifocal pneumonia,right greater than left with tiny pleural effusions. A more focal nodular density in the left lower lobe is indeterminate   measuring 1.2 cm in size. Recommend short-term follow-up chest CT after   treatment approximately 4-6 weeks time to assess for change. Small mediastinal nodes are seen which may simply be reactive. RECOMMENDATIONS:   *pulmonary management*         XR CHEST PORTABLE   Final Result   No pneumothorax identified. Redemonstration of bilateral airspace disease,   right greater than left. XR CHEST PORTABLE   Final Result   Mild central pulmonary congestion      Moderate airspace opacity right lung base and hazy opacities along the left   upper lobe and left lung base which could represent multisegmental   bronchopneumonia. Recommend short-term follow-up      Small questionable small right pleural effusion. 1.6 cm nodule left lung base laterally which is more apparent could represent   a small neoplasm.   Recommend CT correlation         CT CHEST WO CONTRAST    (Results Pending)       IP CONSULT TO HOSPITALIST  IP CONSULT TO INFECTIOUS DISEASES  IP CONSULT TO PULMONOLOGY  IP CONSULT TO ONCOLOGY  IP CONSULT TO NEPHROLOGY    Assessment/Plan:    Active Hospital Problems    Diagnosis     Bandemia [D72.825]      Priority: Medium    Thrombocytosis [D75.839]      Priority: Medium    Normocytic normochromic anemia [D64.9]      Priority: Medium    Uncontrolled hypertension [I10]      Priority: Medium    Elevated BUN [R79.9]      Priority: Medium    Elevated procalcitonin [R79.89]      Priority: Medium    Pleuritic chest pain [R07.81]      Priority: Medium    Hemoptysis [R04.2]      Priority: Medium    Bacteremia due to Streptococcus [R78.81, B95.5]      Priority: Medium    Empyema ([address] [J86.9]      Priority: Medium    Leukocytosis [D72.829]      Priority: Medium    DM2 (diabetes mellitus, type 2) ([facility] [address] [E11.9]      Priority: Medium    Smoker [F17.200]      Priority: Medium    ADHD [F90.9]      Priority: Medium    History of sleeve gastrectomy [Z90.3]      Priority: Medium    Pneumonia of right lower lobe due to infectious organism [J18.9]      Priority: Medium     Sepsis 2/2 strep pneumonia, bacteremia, empyema  - continue ceftriaxone  - no plan for draining fluid at this time  - ID consulted  - pulm consulted  - continue vest therapy  - leukocytosis down trending    Acute hypoxic respiratory failure  - 2/2 above  - wean O2 as able    Hyponatremia  - nephrology consulted  - continue IVF  - Na improved    Hyperkalemia  - nephrology consulted  - s/p lokelma  - continue to monitor    Thrombocytosis  - Hem/onc consulted  - likely reactive but still up trending  - send off labs pending     HTN  - well controlled  - holding home lisinopril    HLD  - holding home statin    DMII  - well controlled  - SSI ordered    ADHD  - continue adderall    Chronic back pain   - continue gabapentin    DVT Prophylaxis: Lovenox  Diet: ADULT DIET;  Regular; 5 carb choices (75 gm/meal)  Code Status: Full Code  PT/OT Eval Status: Not indicated    Dispo - unclear pending specialty recs    Appropriate for A1 Discharge Unit: No      Trent Marie MD

## 2023-01-16 NOTE — PROGRESS NOTES
ID Follow-up NOTE    CC:   Pneumococcal pneumonia, empyema, bacteremia  Antibiotics: Ceftriaxone, Metronidazole    Admit Date: 1/7/2023  Hospital Day: 10    Subjective:     Patient feel good, still with R lower chest pain with deep inspiration / cough      Objective:     Patient Vitals for the past 8 hrs:   BP Temp Temp src Pulse Resp SpO2 Weight   01/16/23 1257 -- -- -- -- 20 -- --   01/16/23 1127 132/87 98.1 °F (36.7 °C) Oral 88 -- 95 % --   01/16/23 1011 -- -- -- -- 20 -- --   01/16/23 0941 -- -- -- -- 22 -- --   01/16/23 0845 (!) 134/92 98.1 °F (36.7 °C) Oral 89 22 92 % --   01/16/23 0616 -- -- -- -- -- -- 150 lb 8 oz (68.3 kg)       I/O last 3 completed shifts: In: 4586 [P.O.:720; I.V.:949; IV Piggyback:50]  Out: 2187 [Urine:1510]  I/O this shift: In: 480 [P.O.:480]  Out: 1000 [Urine:1000]    EXAM:  GENERAL: No apparent distress.     HEENT: Membranes moist, no oral lesion  NECK:  Supple, no lymphadenopathy  LUNGS: R base rales  CARDIAC: RRR, no murmur appreciated  ABD:  + BS, soft / NT  EXT:  No rash, no edema, no lesions  NEURO: No focal neurologic findings  PSYCH: Orientation, sensorium, mood normal  LINES:  Peripheral iv       Data Review:  Lab Results   Component Value Date    WBC 14.6 (H) 01/16/2023    HGB 10.2 (L) 01/16/2023    HCT 29.9 (L) 01/16/2023    MCV 90.3 01/16/2023    PLT 1,138 (HH) 01/16/2023     Lab Results   Component Value Date    CREATININE <0.5 (L) 01/16/2023    BUN 12 01/16/2023     (L) 01/16/2023    K 4.4 01/16/2023    CL 96 (L) 01/16/2023    CO2 26 01/16/2023       Hepatic Function Panel:   Lab Results   Component Value Date/Time    ALKPHOS 116 07/23/2021 05:15 PM    ALT 23 07/23/2021 05:15 PM    AST 18 07/23/2021 05:15 PM    PROT 7.2 07/23/2021 05:15 PM    BILITOT 0.4 07/23/2021 05:15 PM    LABALBU 4.2 07/23/2021 05:15 PM       MICRO:  1/7       BC x2 S pneumoniae               Pleural fluid culture S pneumoniae (PCN NASIMA < 0.06)  1/8       Legionella and pneumococcal ag neg Sputum culture NRF              MRSA nasal screen neg   1/10     BC x2 no growth to date       IMAGING:  CT chest 1/11/23  Impression   Moderate bilateral pleural effusions, slightly increased as compared to   prior. Partial atelectasis of the posterior left lower lobe. Multifocal bilateral airspace disease, improving within the lingula and right   lower lobe though increasing within the right upper lobe as compared to   prior. Increased size of an AP window lymph node, likely reactive. Filling defects within the bronchus intermedius and left lower lobe bronchi,   which can be due to mucoid impaction or aspiration. Indeterminate 1.4 cm left lower lobe pulmonary nodule again noted, for which   short-term follow-up chest CT after treatment and resolution of any acute   symptoms is recommended to ensure resolution. If this does not resolve, it   is of a size amenable to evaluation by PET-CT.     1/13 Chest CT   Impression   Similar small to moderate right and small left pleural effusions. Again seen are bilateral airspace opacities, right greater than left,   compatible with infection. Similar appearance of a 1.4 cm left lower lobe pulmonary nodule. Similar mild mediastinal lymphadenopathy. Similar ectasia of the ascending thoracic aorta and mild dilation of the main   pulmonary artery.        Scheduled Meds:   [START ON 1/17/2023] albuterol  2.5 mg Nebulization Daily    guaiFENesin  600 mg Oral BID    cefTRIAXone (ROCEPHIN) IV  2,000 mg IntraVENous Q24H    lactobacillus  1 capsule Oral BID WC    aspirin  81 mg Oral Daily    enoxaparin  40 mg SubCUTAneous Daily    nicotine  1 patch TransDERmal Daily    insulin lispro  0-4 Units SubCUTAneous TID WC    insulin lispro  0-4 Units SubCUTAneous Nightly    amphetamine-dextroamphetamine  30 mg Oral BID AC    gabapentin  300 mg Oral TID       Continuous Infusions:   IV infusion builder 75 mL/hr at 01/16/23 0304    dextrose sodium chloride 15 mL/hr at 01/13/23 1022       PRN Meds:  HYDROcodone 5 mg - acetaminophen, acetylcysteine, glucose, dextrose bolus **OR** dextrose bolus, glucagon (rDNA), dextrose, sodium chloride flush, sodium chloride, potassium chloride **OR** potassium alternative oral replacement **OR** potassium chloride, magnesium sulfate, ondansetron **OR** ondansetron, acetaminophen **OR** acetaminophen, melatonin, calcium carbonate, ipratropium-albuterol, albuterol, benzonatate      Assessment:     HTN, cigarette use    Multifocal pneumonia  LLL nodule  R Pneumococcal empyema  Pneumococcal bacteremia  Leukocytosis, WBC 14. 6., down further today      Plan:     Cont Ceftriaxone + Metronidazole (oral)  Effusion per Pul - favor R chest tube drainage, may need intrapul thrombolysis     Home on po Levofloxacin 750 mg po daily x 2 weeks further     Medical Decision Making:   The following items were considered in medical decision making:  Discussion of patient care with other providers  Reviewed clinical lab tests  Reviewed radiology tests  Reviewed other diagnostic tests/interventions  Microbiology cultures and other micro tests reviewed      Discussed with pt  Becki Cast MD

## 2023-01-16 NOTE — CONSULTS
[address] [address]                                  CONSULTATION    PATIENT NAME: FRANSISCA FENTON                     :        1960  MED REC NO:   [id number]                          ROOM:       0202  ACCOUNT NO:   [de-identified]                           ADMIT DATE: 2023  PROVIDER:     Matilde De Anda MD    CONSULT DATE:  01/15/2023    REASON FOR CONSULTATION:  Hyponatremia. HISTORY OF PRESENT ILLNESS:  The patient is a 28-year-old  male  patient who presented to [facility] complaining of cough  and shortness of breath. The patient was diagnosed with pneumonia and  his renal panel revealed hyponatremia with a serum sodium of 124 mEq per  liter, which prompted Nephrology consultation. PAST MEDICAL HISTORY:  1. Generalized anxiety disorder. 2.  Hypertension. 3.  Gastroesophageal reflux disease. PAST SURGICAL HISTORY:  Rotator cuff repair. ALLERGIES:  The patient has no known drug allergies. SOCIAL HISTORY:  The patient smokes half-a-pack of cigarettes per day  and does not drink alcohol. FAMILY HISTORY:  Significant for hypertension and coronary artery  disease. REVIEW OF SYSTEMS:  The patient denies any nausea, vomiting or abdominal  pain. Otherwise, a 10-point review of systems was relatively  unremarkable. PHYSICAL EXAMINATION:  VITAL SIGNS:  Blood pressure 125/75, heart rate 82, respirations 19,  temperature 99.1 Fahrenheit. The patient is saturating 94% on room air. GENERAL APPEARANCE:  The patient is alert and oriented x3, not in acute  distress. HEENT:  Eyes revealed normal conjunctivae and reactive pupils. NECK:  Revealed midline trachea and nonpalpable thyroid. LUNGS:  Clear to anterior auscultation bilaterally, nonlabored  breathing. CARDIOVASCULAR EXAM:  Revealed S1 and S2, regular rate and rhythm. No  added murmurs or rubs.   No peripheral edema.  ABDOMINAL EXAM:  Revealed soft abdomen. No organomegaly. SKIN:  Revealed no lesions or rashes. Warm to touch. PSYCHIATRIC:  Revealed good judgment and insight. LYMPHATICS:  Revealed no cervical or axillary adenopathies. ASSESSMENT AND PLAN:  1. Hyponatremia, multifactorial and likely secondary to increased free  water intake along with decreased solids. 2.  Hyperkalemia. We will treat medically, stop lisinopril and apply  low potassium diet. 3.  Pneumonia, currently on antibiotics per primary team.    RECOMMENDATIONS:  1. Continue volume expansion. 2.  Apply daily free water restriction. 3.  Monitor serial labs to avoid overcorrection.         Emre Rutherford MD    D: 01/15/2023 15:37:30       T: 01/15/2023 15:41:02     [email or web address]  Job#: [id number]     Doc#: [id number]    CC:

## 2023-01-17 LAB
ALLERGEN BEEF: 0.32 KU/L
ALLERGEN PHOMA BETAE: <0.1 KU/L
ALLERGEN PORK: 0.11 KU/L
ALLERGEN SEE NOTE: NORMAL
ALLERGEN, FEATHER MIX: NEGATIVE KU/L
ANION GAP SERPL CALCULATED.3IONS-SCNC: 7 MMOL/L (ref 3–16)
ANION GAP SERPL CALCULATED.3IONS-SCNC: 8 MMOL/L (ref 3–16)
ASPERGILLUS FLAVUS ANTIBODIES: NORMAL
ASPERGILLUS FUMIGATUS #1: NORMAL
ASPERGILLUS FUMIGATUS #2: NORMAL
ASPERGILLUS FUMIGATUS #3: NORMAL
ASPERGILLUS FUMIGATUS #6: NORMAL
AUREOBASIDIUM PULLULANS: NORMAL
BUN BLDV-MCNC: 10 MG/DL (ref 7–20)
BUN BLDV-MCNC: 11 MG/DL (ref 7–20)
BUN BLDV-MCNC: 12 MG/DL (ref 7–20)
BUN BLDV-MCNC: 13 MG/DL (ref 7–20)
CALCIUM SERPL-MCNC: 8 MG/DL (ref 8.3–10.6)
CALCIUM SERPL-MCNC: 8 MG/DL (ref 8.3–10.6)
CALCIUM SERPL-MCNC: 8.1 MG/DL (ref 8.3–10.6)
CALCIUM SERPL-MCNC: 8.2 MG/DL (ref 8.3–10.6)
CHLORIDE BLD-SCNC: 101 MMOL/L (ref 99–110)
CHLORIDE BLD-SCNC: 102 MMOL/L (ref 99–110)
CHLORIDE BLD-SCNC: 102 MMOL/L (ref 99–110)
CHLORIDE BLD-SCNC: 96 MMOL/L (ref 99–110)
CO2: 26 MMOL/L (ref 21–32)
CO2: 26 MMOL/L (ref 21–32)
CO2: 27 MMOL/L (ref 21–32)
CO2: 27 MMOL/L (ref 21–32)
CREAT SERPL-MCNC: <0.5 MG/DL (ref 0.8–1.3)
GFR SERPL CREATININE-BSD FRML MDRD: >60 ML/MIN/{1.73_M2}
GLUCOSE BLD-MCNC: 107 MG/DL (ref 70–99)
GLUCOSE BLD-MCNC: 109 MG/DL (ref 70–99)
GLUCOSE BLD-MCNC: 111 MG/DL (ref 70–99)
GLUCOSE BLD-MCNC: 115 MG/DL (ref 70–99)
GLUCOSE BLD-MCNC: 135 MG/DL (ref 70–99)
GLUCOSE BLD-MCNC: 153 MG/DL (ref 70–99)
GLUCOSE BLD-MCNC: 166 MG/DL (ref 70–99)
GLUCOSE BLD-MCNC: 235 MG/DL (ref 70–99)
HCT VFR BLD CALC: 30.5 % (ref 40.5–52.5)
HEMATOLOGY PATH CONSULT: NO
HEMOGLOBIN: 10.2 G/DL (ref 13.5–17.5)
MCH RBC QN AUTO: 30.2 PG (ref 26–34)
MCHC RBC AUTO-ENTMCNC: 33.3 G/DL (ref 31–36)
MCV RBC AUTO: 90.7 FL (ref 80–100)
MICROPOLYSPORA FAENI: NORMAL
PDW BLD-RTO: 14.2 % (ref 12.4–15.4)
PERFORMED ON: ABNORMAL
PIGEON SERUM ABS: NORMAL
PLATELET # BLD: 1100 K/UL (ref 135–450)
PMV BLD AUTO: 7 FL (ref 5–10.5)
POTASSIUM SERPL-SCNC: 4.2 MMOL/L (ref 3.5–5.1)
POTASSIUM SERPL-SCNC: 4.8 MMOL/L (ref 3.5–5.1)
POTASSIUM SERPL-SCNC: 5 MMOL/L (ref 3.5–5.1)
POTASSIUM SERPL-SCNC: 5.3 MMOL/L (ref 3.5–5.1)
RBC # BLD: 3.36 M/UL (ref 4.2–5.9)
SACCHAROMONOSPORA VIRIDIS: NORMAL
SODIUM BLD-SCNC: 130 MMOL/L (ref 136–145)
SODIUM BLD-SCNC: 134 MMOL/L (ref 136–145)
SODIUM BLD-SCNC: 135 MMOL/L (ref 136–145)
SODIUM BLD-SCNC: 137 MMOL/L (ref 136–145)
THERMOACTINOMYCES CANDIDUS AB: NORMAL
THERMOACTINOMYCES VULGARIS #1: NORMAL
WBC # BLD: 12.2 K/UL (ref 4–11)

## 2023-01-17 PROCEDURE — 6370000000 HC RX 637 (ALT 250 FOR IP): Performed by: INTERNAL MEDICINE

## 2023-01-17 PROCEDURE — 6360000002 HC RX W HCPCS: Performed by: INTERNAL MEDICINE

## 2023-01-17 PROCEDURE — 2580000003 HC RX 258: Performed by: INTERNAL MEDICINE

## 2023-01-17 PROCEDURE — 80048 BASIC METABOLIC PNL TOTAL CA: CPT

## 2023-01-17 PROCEDURE — 6370000000 HC RX 637 (ALT 250 FOR IP): Performed by: NURSE PRACTITIONER

## 2023-01-17 PROCEDURE — 99232 SBSQ HOSP IP/OBS MODERATE 35: CPT | Performed by: INTERNAL MEDICINE

## 2023-01-17 PROCEDURE — 85027 COMPLETE CBC AUTOMATED: CPT

## 2023-01-17 PROCEDURE — 1200000000 HC SEMI PRIVATE

## 2023-01-17 PROCEDURE — 36415 COLL VENOUS BLD VENIPUNCTURE: CPT

## 2023-01-17 RX ORDER — IBUPROFEN 400 MG/1
400 TABLET ORAL EVERY 6 HOURS PRN
Status: DISCONTINUED | OUTPATIENT
Start: 2023-01-17 | End: 2023-01-18 | Stop reason: HOSPADM

## 2023-01-17 RX ORDER — HYDROCODONE BITARTRATE AND ACETAMINOPHEN 5; 325 MG/1; MG/1
1 TABLET ORAL EVERY 4 HOURS PRN
Status: DISCONTINUED | OUTPATIENT
Start: 2023-01-17 | End: 2023-01-18 | Stop reason: HOSPADM

## 2023-01-17 RX ORDER — IBUPROFEN 400 MG/1
400 TABLET ORAL EVERY 6 HOURS PRN
Status: DISCONTINUED | OUTPATIENT
Start: 2023-01-17 | End: 2023-01-17

## 2023-01-17 RX ADMIN — CEFTRIAXONE 2000 MG: 2 INJECTION, POWDER, FOR SOLUTION INTRAMUSCULAR; INTRAVENOUS at 10:20

## 2023-01-17 RX ADMIN — GUAIFENESIN 600 MG: 600 TABLET, EXTENDED RELEASE ORAL at 19:49

## 2023-01-17 RX ADMIN — GABAPENTIN 300 MG: 300 CAPSULE ORAL at 14:14

## 2023-01-17 RX ADMIN — IBUPROFEN 400 MG: 400 TABLET, FILM COATED ORAL at 23:03

## 2023-01-17 RX ADMIN — Medication 1 CAPSULE: at 08:54

## 2023-01-17 RX ADMIN — HYDROCODONE BITARTRATE AND ACETAMINOPHEN 1 TABLET: 5; 325 TABLET ORAL at 01:50

## 2023-01-17 RX ADMIN — Medication 10 ML: at 08:54

## 2023-01-17 RX ADMIN — DEXTROAMPHETAMINE SACCHARATE, AMPHETAMINE ASPARTATE, DEXTROAMPHETAMINE SULFATE, AMPHETAMINE SULFATE TABLETS, 10 MG,CLL 30 MG: 2.5; 2.5; 2.5; 2.5 TABLET ORAL at 16:21

## 2023-01-17 RX ADMIN — GABAPENTIN 300 MG: 300 CAPSULE ORAL at 08:54

## 2023-01-17 RX ADMIN — DEXTROAMPHETAMINE SACCHARATE, AMPHETAMINE ASPARTATE, DEXTROAMPHETAMINE SULFATE, AMPHETAMINE SULFATE TABLETS, 10 MG,CLL 30 MG: 2.5; 2.5; 2.5; 2.5 TABLET ORAL at 05:37

## 2023-01-17 RX ADMIN — GUAIFENESIN 600 MG: 600 TABLET, EXTENDED RELEASE ORAL at 08:54

## 2023-01-17 RX ADMIN — HYDROCODONE BITARTRATE AND ACETAMINOPHEN 1 TABLET: 5; 325 TABLET ORAL at 14:14

## 2023-01-17 RX ADMIN — HYDROCODONE BITARTRATE AND ACETAMINOPHEN 1 TABLET: 5; 325 TABLET ORAL at 18:45

## 2023-01-17 RX ADMIN — IBUPROFEN 400 MG: 400 TABLET, FILM COATED ORAL at 14:29

## 2023-01-17 RX ADMIN — SODIUM CHLORIDE: 9 INJECTION, SOLUTION INTRAVENOUS at 10:19

## 2023-01-17 RX ADMIN — Medication 3 MG: at 19:49

## 2023-01-17 RX ADMIN — Medication 10 ML: at 19:47

## 2023-01-17 RX ADMIN — HYDROCODONE BITARTRATE AND ACETAMINOPHEN 1 TABLET: 5; 325 TABLET ORAL at 08:54

## 2023-01-17 RX ADMIN — Medication 1 CAPSULE: at 16:21

## 2023-01-17 RX ADMIN — VASOPRESSIN: 20 INJECTION, SOLUTION INTRAVENOUS at 08:15

## 2023-01-17 RX ADMIN — GABAPENTIN 300 MG: 300 CAPSULE ORAL at 19:49

## 2023-01-17 RX ADMIN — ASPIRIN 81 MG: 81 TABLET, COATED ORAL at 08:54

## 2023-01-17 RX ADMIN — ENOXAPARIN SODIUM 40 MG: 100 INJECTION SUBCUTANEOUS at 08:54

## 2023-01-17 ASSESSMENT — PAIN DESCRIPTION - ORIENTATION
ORIENTATION: RIGHT

## 2023-01-17 ASSESSMENT — PAIN SCALES - WONG BAKER
WONGBAKER_NUMERICALRESPONSE: 0

## 2023-01-17 ASSESSMENT — PAIN - FUNCTIONAL ASSESSMENT
PAIN_FUNCTIONAL_ASSESSMENT: PREVENTS OR INTERFERES SOME ACTIVE ACTIVITIES AND ADLS

## 2023-01-17 ASSESSMENT — PAIN SCALES - GENERAL
PAINLEVEL_OUTOF10: 8
PAINLEVEL_OUTOF10: 10
PAINLEVEL_OUTOF10: 9
PAINLEVEL_OUTOF10: 8
PAINLEVEL_OUTOF10: 6
PAINLEVEL_OUTOF10: 0
PAINLEVEL_OUTOF10: 0

## 2023-01-17 ASSESSMENT — PAIN DESCRIPTION - DESCRIPTORS
DESCRIPTORS: SHARP

## 2023-01-17 ASSESSMENT — PAIN DESCRIPTION - LOCATION
LOCATION: SHOULDER
LOCATION: BACK;SHOULDER
LOCATION: BACK
LOCATION: SHOULDER

## 2023-01-17 ASSESSMENT — PAIN DESCRIPTION - FREQUENCY: FREQUENCY: CONTINUOUS

## 2023-01-17 ASSESSMENT — PAIN DESCRIPTION - PAIN TYPE: TYPE: ACUTE PAIN

## 2023-01-17 NOTE — PROGRESS NOTES
[facility] Progress Note       CC: hyponatremia  HPI   The patient is a 80-year-old  male  patient who presented to [facility] complaining of cough  and shortness of breath. The patient was diagnosed with pneumonia and  his renal panel revealed hyponatremia with a serum sodium of 124 mEq /l, which prompted Nephrology consultation. SUBJECTIVE  No CP or SOB    SOC: no visitors      Scheduled Meds:   albuterol  2.5 mg Nebulization Daily    guaiFENesin  600 mg Oral BID    cefTRIAXone (ROCEPHIN) IV  2,000 mg IntraVENous Q24H    lactobacillus  1 capsule Oral BID WC    aspirin  81 mg Oral Daily    enoxaparin  40 mg SubCUTAneous Daily    nicotine  1 patch TransDERmal Daily    insulin lispro  0-4 Units SubCUTAneous TID WC    insulin lispro  0-4 Units SubCUTAneous Nightly    amphetamine-dextroamphetamine  30 mg Oral BID AC    gabapentin  300 mg Oral TID     Continuous Infusions:   dextrose      sodium chloride 25 mL/hr at 01/17/23 1019     PRN Meds:HYDROcodone 5 mg - acetaminophen, acetylcysteine, glucose, dextrose bolus **OR** dextrose bolus, glucagon (rDNA), dextrose, sodium chloride flush, sodium chloride, magnesium sulfate, ondansetron **OR** ondansetron, acetaminophen **OR** acetaminophen, melatonin, calcium carbonate, ipratropium-albuterol, albuterol, benzonatate      Objective:      Physical Exam  Wt Readings from Last 3 Encounters:   01/17/23 150 lb 9.2 oz (68.3 kg)   04/18/22 160 lb (72.6 kg)   07/23/21 250 lb (113.4 kg)     Temp Readings from Last 3 Encounters:   01/17/23 98.2 °F (36.8 °C) (Oral)   04/18/22 98.1 °F (36.7 °C)   07/23/21 98.8 °F (37.1 °C) (Oral)     BP Readings from Last 3 Encounters:   01/17/23 (!) 157/87   04/18/22 (!) 148/82   07/23/21 (!) 146/96     Pulse Readings from Last 3 Encounters:   01/17/23 91   04/18/22 70   07/23/21 92    GENERAL APPEARANCE:  The patient is alert and oriented x3, not in acute  distress.   LUNGS:  Clear to anterior auscultation bilaterally, nonlabored  breathing. CARDIOVASCULAR EXAM:   S1 and S2, regular rate and rhythm. No  added murmurs or rubs. No peripheral edema. ABDOMINAL EXAM:  soft abdomen. No organomegaly. SKIN:  no lesions or rashes. Warm to touch. Pamalee Bucker LYMPHATICS: no cervical or axillary adenopathies. Lab Review   Lab Results   Component Value Date    WBC 14.6 (H) 01/16/2023    HGB 10.2 (L) 01/16/2023    HCT 29.9 (L) 01/16/2023    MCV 90.3 01/16/2023    PLT 1,138 (HH) 01/16/2023     Lab Results   Component Value Date/Time     01/17/2023 04:16 AM    K 5.3 01/17/2023 04:16 AM    K 4.3 03/23/2020 12:30 PM     01/17/2023 04:16 AM    CO2 26 01/17/2023 04:16 AM    BUN 11 01/17/2023 04:16 AM    CREATININE <0.5 01/17/2023 04:16 AM    GLUCOSE 109 01/17/2023 04:16 AM    CALCIUM 8.0 01/17/2023 04:16 AM          Patient Active Problem List    Diagnosis Date Noted    Septicemia ([facility] [address] 01/16/2023    Lung nodule 01/16/2023    Mediastinal adenopathy 01/16/2023    Hyponatremia 01/16/2023    Bandemia 01/10/2023    Thrombocytosis 01/10/2023    Normocytic normochromic anemia 01/10/2023    Uncontrolled hypertension 01/10/2023    Elevated BUN 01/10/2023    Elevated procalcitonin 01/10/2023    Pleuritic chest pain 01/10/2023    Hemoptysis 01/10/2023    Bacteremia due to Streptococcus pneumoniae 01/10/2023    Pneumonia of both lungs due to Streptococcus pneumoniae ([facility] [address] 01/09/2023    Empyema ([facility] [address] 01/09/2023    Leukocytosis 01/09/2023    DM2 (diabetes mellitus, type 2) ([facility] [address] 01/07/2023    Smoker 01/07/2023    ADHD 01/07/2023    History of sleeve gastrectomy 01/07/2023    Pneumonia of right lower lobe due to infectious organism 01/07/2023    Hypoxia 12/13/2017    Bronchitis 12/13/2017    CAP (community acquired pneumonia) due to Chlamydia species 12/13/2017       ASSESSMENT AND PLAN   1.   Hyponatremia, multifactorial and likely secondary to increased free  water intake along with decreased solids.  -Na trending up , at 135 now  2. Hyperkalemia  -stopped the  lisinopril   -low potassium diet.  -stopped  the K supplements today  3. Pneumonia, currently on antibiotics per primary team and ID.

## 2023-01-17 NOTE — FLOWSHEET NOTE
01/16/23 1953   Assessment   Charting Type Shift assessment   Psychosocial   Psychosocial (WDL) WDL   Neurological   Neuro (WDL) WDL   Level of Consciousness 0   Orientation Level Oriented X4   Cognition Appropriate judgement; Appropriate safety awareness; Appropriate attention/concentration; Appropriate for developmental age; Follows commands   Speech Clear   Peel Coma Scale   Eye Opening 4   Best Verbal Response 5   Best Motor Response 6   Peel Coma Scale Score 15   HEENT (Head, Ears, Eyes, Nose, & Throat)   HEENT (WDL) X   Right Eye Glasses   Left Eye Glasses   Teeth Missing teeth   Respiratory   Respiratory (WDL) X   Respiratory Pattern Regular   Respiratory Depth Normal   Respiratory Quality/Effort Unlabored;Dyspnea with exertion   Chest Assessment Chest expansion symmetrical   L Breath Sounds Rhonchi   R Breath Sounds Rhonchi   Subcutaneous Air/Crepitus None   Breath Sounds   Right Upper Lobe Crackles   Right Middle Lobe Crackles   Right Lower Lobe Crackles   Left Upper Lobe Crackles   Left Lower Lobe Crackles   Cough/Sputum   Sputum How Obtained Spontaneous cough   Cough Congested;Productive   Cardiac   Cardiac (WDL) WDL  (not on tele)   Cardiac Regularity Regular   Heart Sounds S1, S2   Pacemaker   Pacemaker No   Gastrointestinal   Abdominal (WDL) WDL   Genitourinary   Genitourinary (WDL) WDL   Flank Tenderness Right   Suprapubic Tenderness No   Dysuria (Pain/Burning w/Urination) No   Peripheral Vascular   Peripheral Vascular (WDL) WDL   Edema None   Skin Integumentary    Skin Integumentary (WDL) WDL   Musculoskeletal   Musculoskeletal (WDL) WDL

## 2023-01-17 NOTE — PROGRESS NOTES
Hospitalist Progress Note      PCP: Agustina Hallman MD    Date of Admission: 1/7/2023    Chief Complaint: Fatigue    Hospital Course:   Leopold Long is a 65 y.o. male. He presented to the eR from home. He c/o feeling ill for about a week. He describes feeling constitutionally ill w/ fatigue, malaise, loss of appetite. Along the way he has had a worsening cough, worsening dyspnea, and pretty marked right-sided pleuritic chest pain along the lateral and anterolater right costal margin. CXR with mild central pulmonary congestion. Small right pleural effusion. Thoracentesis with 300 ml fluid removed. Subjective:  Continued congestion and pain in right lung base.  Improvement with vest. No chest pain       Medications:  Reviewed    Infusion Medications    dextrose      sodium chloride 25 mL/hr at 01/17/23 1019     Scheduled Medications    albuterol  2.5 mg Nebulization Daily    guaiFENesin  600 mg Oral BID    cefTRIAXone (ROCEPHIN) IV  2,000 mg IntraVENous Q24H    lactobacillus  1 capsule Oral BID WC    aspirin  81 mg Oral Daily    enoxaparin  40 mg SubCUTAneous Daily    nicotine  1 patch TransDERmal Daily    insulin lispro  0-4 Units SubCUTAneous TID WC    insulin lispro  0-4 Units SubCUTAneous Nightly    amphetamine-dextroamphetamine  30 mg Oral BID AC    gabapentin  300 mg Oral TID     PRN Meds: HYDROcodone 5 mg - acetaminophen, ibuprofen, acetylcysteine, glucose, dextrose bolus **OR** dextrose bolus, glucagon (rDNA), dextrose, sodium chloride flush, sodium chloride, magnesium sulfate, ondansetron **OR** ondansetron, acetaminophen **OR** acetaminophen, melatonin, calcium carbonate, ipratropium-albuterol, albuterol, benzonatate      Intake/Output Summary (Last 24 hours) at 1/17/2023 1423  Last data filed at 1/17/2023 1016  Gross per 24 hour   Intake 1310 ml   Output 1000 ml   Net 310 ml       Physical Exam Performed:    BP (!) 157/87   Pulse 91   Temp 98.2 °F (36.8 °C) (Oral)   Resp 18   Ht 5' 8\" (1.727 m)   Wt 150 lb 9.2 oz (68.3 kg)   SpO2 95%   BMI 22.89 kg/m²     General appearance: No apparent distress, appears stated age and cooperative.  HEENT: Pupils equal, round, and reactive to light. Conjunctivae/corneas clear.  Neck: Supple, with full range of motion. No jugular venous distention. Trachea midline.  Respiratory:  Normal respiratory effort. Crackles in right lung base with some improvement   Cardiovascular: Regular rate and rhythm with normal S1/S2 without murmurs, rubs or gallops.  Abdomen: Soft, non-tender, non-distended with normal bowel sounds.  Musculoskeletal: No clubbing, cyanosis or edema bilaterally.  Full range of motion without deformity.  Skin: Skin color, texture, turgor normal.  No rashes or lesions.  Neurologic:  Neurovascularly intact without any focal sensory/motor deficits. Cranial nerves: II-XII intact, grossly non-focal.  Psychiatric: Alert and oriented, thought content appropriate, normal insight  Capillary Refill: Brisk, 3 seconds, normal   Peripheral Pulses: +2 palpable, equal bilaterally       Labs:   Recent Labs     01/15/23  0428 01/16/23  0620 01/17/23  1127   WBC 17.2* 14.6* 12.2*   HGB 11.0* 10.2* 10.2*   HCT 32.4* 29.9* 30.5*   PLT 1,038* 1,138* 1,100*     Recent Labs     01/16/23  2349 01/17/23  0416 01/17/23  1127   * 134* 135*   K 5.0 5.3* 4.2   CL 96* 101 102   CO2 27 26 26   BUN 13 11 10   CREATININE <0.5* <0.5* <0.5*   CALCIUM 8.0* 8.0* 8.1*     No results for input(s): AST, ALT, BILIDIR, BILITOT, ALKPHOS in the last 72 hours.  No results for input(s): INR in the last 72 hours.    No results for input(s): CKTOTAL, TROPONINI in the last 72 hours.      Urinalysis:      Lab Results   Component Value Date/Time    NITRU Negative 07/23/2021 04:28 PM    WBCUA 0-2 07/23/2021 04:28 PM    RBCUA 3-4 07/23/2021 04:28 PM    BLOODU Negative 07/23/2021 04:28 PM    SPECGRAV >=1.030 07/23/2021 04:28 PM    GLUCOSEU Negative 07/23/2021 04:28 PM       Radiology:  CT  CHEST WO CONTRAST   Final Result   1. Minimal improvement in the aeration of the right upper lobe. However,   there is still bilateral consolidation. 2. The left pleural effusion is smaller than previously seen. No   pneumothorax. 3. The right pleural effusion is unchanged. 4. The other findings seen on the recent chest CT a unchanged. CT CHEST WO CONTRAST   Final Result   Similar small to moderate right and small left pleural effusions. Again seen are bilateral airspace opacities, right greater than left,   compatible with infection. Similar appearance of a 1.4 cm left lower lobe pulmonary nodule. Similar mild mediastinal lymphadenopathy. Similar ectasia of the ascending thoracic aorta and mild dilation of the main   pulmonary artery. RECOMMENDATIONS:   Recommend a short-term follow-up chest CT after resolution of acute symptoms,   to further assess the indeterminate left lower lobe pulmonary nodule. XR CHEST PORTABLE   Final Result   Relatively stable pattern of pneumonia in the right lung. XR CHEST PORTABLE   Final Result   Slightly improved aeration in the lungs from prior exam.  Dense airspace   changes persist in the right lower lung zone with a small pleural effusion. CT CHEST WO CONTRAST   Final Result   Moderate bilateral pleural effusions, slightly increased as compared to   prior. Partial atelectasis of the posterior left lower lobe. Multifocal bilateral airspace disease, improving within the lingula and right   lower lobe though increasing within the right upper lobe as compared to   prior. Increased size of an AP window lymph node, likely reactive. Filling defects within the bronchus intermedius and left lower lobe bronchi,   which can be due to mucoid impaction or aspiration.       Indeterminate 1.4 cm left lower lobe pulmonary nodule again noted, for which   short-term follow-up chest CT after treatment and resolution of any acute   symptoms is recommended to ensure resolution. If this does not resolve, it   is of a size amenable to evaluation by PET-CT. XR CHEST (2 VW)   Final Result   Bilateral pneumonias without any improvement. Follow-up advised. CT CHEST W CONTRAST   Final Result   Multifocal pneumonia,right greater than left with tiny pleural effusions. A more focal nodular density in the left lower lobe is indeterminate   measuring 1.2 cm in size. Recommend short-term follow-up chest CT after   treatment approximately 4-6 weeks time to assess for change. Small mediastinal nodes are seen which may simply be reactive. RECOMMENDATIONS:   *pulmonary management*         XR CHEST PORTABLE   Final Result   No pneumothorax identified. Redemonstration of bilateral airspace disease,   right greater than left. XR CHEST PORTABLE   Final Result   Mild central pulmonary congestion      Moderate airspace opacity right lung base and hazy opacities along the left   upper lobe and left lung base which could represent multisegmental   bronchopneumonia. Recommend short-term follow-up      Small questionable small right pleural effusion. 1.6 cm nodule left lung base laterally which is more apparent could represent   a small neoplasm.   Recommend CT correlation             IP CONSULT TO HOSPITALIST  IP CONSULT TO INFECTIOUS DISEASES  IP CONSULT TO PULMONOLOGY  IP CONSULT TO ONCOLOGY  IP CONSULT TO NEPHROLOGY    Assessment/Plan:    Active Hospital Problems    Diagnosis     Septicemia ([facility] [address] [A41.9]      Priority: Medium    Lung nodule [R91.1]      Priority: Medium    Mediastinal adenopathy [R59.0]      Priority: Medium    Hyponatremia [E87.1]      Priority: Medium    Bandemia [D72.825]      Priority: Medium    Thrombocytosis [D75.839]      Priority: Medium    Normocytic normochromic anemia [D64.9]      Priority: Medium    Uncontrolled hypertension [I10]      Priority: Medium    Elevated BUN [R79.9] Priority: Medium    Elevated procalcitonin [R79.89]      Priority: Medium    Pleuritic chest pain [R07.81]      Priority: Medium    Hemoptysis [R04.2]      Priority: Medium    Bacteremia due to Streptococcus pneumoniae [R78.81, B95.3]      Priority: Medium    Empyema (HCC) [J86.9]      Priority: Medium    Leukocytosis [D72.829]      Priority: Medium    Pneumonia of both lungs due to Streptococcus pneumoniae ([facility] [address] [J13]      Priority: Medium    DM2 (diabetes mellitus, type 2) ([facility] [address] [E11.9]      Priority: Medium    Smoker [F17.200]      Priority: Medium    ADHD [F90.9]      Priority: Medium    History of sleeve gastrectomy [Z90.3]      Priority: Medium    Pneumonia of right lower lobe due to infectious organism [J18.9]      Priority: Medium     Sepsis 2/2 strep pneumonia, bacteremia, empyema  - continue ceftriaxone, flagyl  - no plan for draining fluid at this time  - ID consulted  - pulm consulted  - continue vest therapy  - leukocytosis down trending    Acute hypoxic respiratory failure  - 2/2 above  - wean O2 as able    Hyponatremia  - nephrology consulted  - continue IVF  - Na improved    Hyperkalemia  - nephrology consulted  - s/p lokelma  - continue to monitor    Thrombocytosis  - Hem/onc consulted  - likely reactive  - send off labs pending     HTN  - well controlled  - holding home lisinopril    HLD  - holding home statin    DMII  - well controlled  - SSI ordered    ADHD  - continue adderall    Chronic back pain   - continue gabapentin    DVT Prophylaxis: Lovenox  Diet: ADULT DIET;  Regular; 5 carb choices (75 gm/meal)  Code Status: Full Code  PT/OT Eval Status: Not indicated    Dispo - home likely tomorrow    Appropriate for A1 Discharge Unit: No      Betty Machado MD

## 2023-01-17 NOTE — PLAN OF CARE
Problem: Pain  Goal: Verbalizes/displays adequate comfort level or baseline comfort level  Outcome: Progressing   Instructed patient on rating pain level using 0-10 verbal scale. PRN medications available as needed. Non pharmaceutical interventions available. Patient repositioning in bed at this time and denies further interventions.

## 2023-01-17 NOTE — PLAN OF CARE
Problem: Chronic Conditions and Co-morbidities  Goal: Patient's chronic conditions and co-morbidity symptoms are monitored and maintained or improved  Outcome: Progressing   Instructed on importance of following ACHS monitoring and correcting glucose levels as needed with SSI per MAR.
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
## 2023-01-17 NOTE — PROGRESS NOTES
Infectious Disease Follow up Notes    CC :  pneumococcal pneumonia, bacteremia, empyema      Antibiotics:   Rocephin 2g q24  Flagyl 500 po TID     Admit Date:   1/7/2023  Hospital Day: 11    Subjective:   No fever. On RA   Feeling better but with continued pleuritic pain posterior R lung. Coughing less.      Objective:     Patient Vitals for the past 8 hrs:   BP Temp Temp src Pulse Resp SpO2   01/17/23 1500 (!) 159/105 98.8 °F (37.1 °C) Oral 84 18 96 %   01/17/23 1414 -- -- -- -- 18 --   01/17/23 0924 -- -- -- -- 20 --   01/17/23 0854 -- -- -- -- 20 --   01/17/23 0803 (!) 157/87 98.2 °F (36.8 °C) Oral 91 20 95 %         EXAM:  General:   alert, oriented, NAD    HEENT:   NCAT, PERRL,  sclera anicteric  MMM, no thrush  NECK:   supple     LUNGS:   Rhonchi, rub post lung fields moid   CV:    RRR    ABD:  soft, flat, NT    EXT:  no focal rash         LINE:   PIV in place         Scheduled Meds:   albuterol  2.5 mg Nebulization Daily    guaiFENesin  600 mg Oral BID    cefTRIAXone (ROCEPHIN) IV  2,000 mg IntraVENous Q24H    lactobacillus  1 capsule Oral BID WC    aspirin  81 mg Oral Daily    enoxaparin  40 mg SubCUTAneous Daily    nicotine  1 patch TransDERmal Daily    insulin lispro  0-4 Units SubCUTAneous TID WC    insulin lispro  0-4 Units SubCUTAneous Nightly    amphetamine-dextroamphetamine  30 mg Oral BID AC    gabapentin  300 mg Oral TID       Continuous Infusions:   dextrose      sodium chloride 25 mL/hr at 01/17/23 1019          Data Review:    Lab Results   Component Value Date    WBC 12.2 (H) 01/17/2023    HGB 10.2 (L) 01/17/2023    HCT 30.5 (L) 01/17/2023    MCV 90.7 01/17/2023    PLT 1,100 (HH) 01/17/2023     Lab Results   Component Value Date    CREATININE <0.5 (L) 01/17/2023    BUN 10 01/17/2023     (L) 01/17/2023    K 4.2 01/17/2023     01/17/2023    CO2 26 01/17/2023       Hepatic Function Panel:   Lab Results Component Value Date/Time    ALKPHOS 116 07/23/2021 05:15 PM    ALT 23 07/23/2021 05:15 PM    AST 18 07/23/2021 05:15 PM    PROT 7.2 07/23/2021 05:15 PM    BILITOT 0.4 07/23/2021 05:15 PM    LABALBU 4.2 07/23/2021 05:15 PM         MICRO:  1/7 BC x2 S pneumoniae    Pleural fluid culture S pneumoniae   1/8 Legionella and pneumococcal ag neg   Sputum culture NRF   MRSA nasal screen neg   1/10 BC x2 neg      Streptococcus pneumoniae   Antibiotic Interpretation Microscan  Method Status    benzylpenicillin (meningitis) Sensitive <=0.06 mcg/mL BACTERIAL SUSCEPTIBILITY PANEL BY NASIMA     benzylpenicillin (oral) Sensitive <=0.06 mcg/mL BACTERIAL SUSCEPTIBILITY PANEL BY NASIMA     benzylpenicillin (pneumoniae) Sensitive <=0.06 mcg/mL BACTERIAL SUSCEPTIBILITY PANEL BY NASIMA     cefotaxime (meningitis) Sensitive <=0.12 mcg/mL BACTERIAL SUSCEPTIBILITY PANEL BY NASIMA     Cefotaxime (other) Sensitive <=0.12 mcg/mL BACTERIAL SUSCEPTIBILITY PANEL BY NASIMA     cefTRIAXone (meningitis) Sensitive <=0.12 mcg/mL BACTERIAL SUSCEPTIBILITY PANEL BY NASIMA     Ceftriaxone (other) Sensitive <=0.12 mcg/mL BACTERIAL SUSCEPTIBILITY PANEL BY NASIMA     erythromycin Sensitive <=0.12 mcg/mL BACTERIAL SUSCEPTIBILITY PANEL BY NASIMA     trimethoprim-sulfamethoxazole Sensitive <=10 mcg/mL BACTERIAL SUSCEPTIBILITY PANEL BY NASIMA     vancomycin Sensitive 0.25 mcg/mL BACTERIAL SUSCEPTIBILITY PANEL BY NASIMA       levofloxacin Sensitive 1 mcg/mL BACTERIAL SUSCEPTIBILITY PANEL BY NASIMA       IMAGING:  CXR 1/10/23  Large consolidation right lower lobe of the lung and moderate consolidation   left perihilar region. Left lung base infiltrate. Small right pleural   effusion. There is no pneumothorax. These findings are largely unchanged. Normal cardiac size. CT chest 1/8/23  Impression   Multifocal pneumonia,right greater than left with tiny pleural effusions. A more focal nodular density in the left lower lobe is indeterminate   measuring 1.2 cm in size.  Recommend short-term follow-up chest CT after   treatment approximately 4-6 weeks time to assess for change. Small mediastinal nodes are seen which may simply be reactive. CT chest 1/11/23  Impression   Moderate bilateral pleural effusions, slightly increased as compared to   prior. Partial atelectasis of the posterior left lower lobe. Multifocal bilateral airspace disease, improving within the lingula and right   lower lobe though increasing within the right upper lobe as compared to   prior. Increased size of an AP window lymph node, likely reactive. Filling defects within the bronchus intermedius and left lower lobe bronchi,   which can be due to mucoid impaction or aspiration. Indeterminate 1.4 cm left lower lobe pulmonary nodule again noted, for which   short-term follow-up chest CT after treatment and resolution of any acute   symptoms is recommended to ensure resolution. If this does not resolve, it   is of a size amenable to evaluation by PET-CT. CT chest 1/16/23  Small bilateral pleural effusions are present. These appear to be smaller   than previously seen. Impression   1. Minimal improvement in the aeration of the right upper lobe. However,   there is still bilateral consolidation. 2. The left pleural effusion is smaller than previously seen. No   pneumothorax. 3. The right pleural effusion is unchanged. 4. The other findings seen on the recent chest CT a unchanged.        Assessment:     Patient Active Problem List    Diagnosis Date Noted    Septicemia ([facility] [address] 01/16/2023     Priority: Medium    Lung nodule 01/16/2023     Priority: Medium    Mediastinal adenopathy 01/16/2023     Priority: Medium    Hyponatremia 01/16/2023     Priority: Medium    Bandemia 01/10/2023     Priority: Medium    Thrombocytosis 01/10/2023     Priority: Medium    Normocytic normochromic anemia 01/10/2023     Priority: Medium    Uncontrolled hypertension 01/10/2023     Priority: Medium Elevated BUN 01/10/2023     Priority: Medium    Elevated procalcitonin 01/10/2023     Priority: Medium    Pleuritic chest pain 01/10/2023     Priority: Medium    Hemoptysis 01/10/2023     Priority: Medium    Bacteremia due to Streptococcus pneumoniae 01/10/2023     Priority: Medium    Pneumonia of both lungs due to Streptococcus pneumoniae ([address] 01/09/2023     Priority: Medium    Empyema ([address] 01/09/2023     Priority: Medium    Leukocytosis 01/09/2023     Priority: Medium    DM2 (diabetes mellitus, type 2) ([address] 01/07/2023     Priority: Medium    Smoker 01/07/2023     Priority: Medium    ADHD 01/07/2023     Priority: Medium    History of sleeve gastrectomy 01/07/2023     Priority: Medium    Pneumonia of right lower lobe due to infectious organism 01/07/2023     Priority: Medium    Hypoxia 12/13/2017    Bronchitis 12/13/2017    CAP (community acquired pneumonia) due to Chlamydia species 12/13/2017       History of smoking, HTN     Admitted 1/7/23 with severe multifocal pneumococcal pneumonia with bacteremia and empyema     Nodular density LLL of unclear significane for which R  adiology has suggested short term follow-up CT      WBC and plt are both now trending down  He is AF      History of C diff infection 3/2020 (+PCR, Rx vanc, no recurrence)     NKDA     Plan:     Continue rocephin, flagyl for now     Decline in WBC and plt is reassuring     Seems not enough fluid for chest tube drainage.     Given clinical improvement and decline in WBC, hard to press for decortication at this point     If the labs continue to improve and he remains AF, I would be ok with DC on po levofloxacin x at least 2 more weeks, as early as 1/18/23 with close follow-up       Discussed with patient/family, all questions answered        Allison Ash MD  Phone: [phone]   Fax : [phone]

## 2023-01-18 ENCOUNTER — TELEPHONE (OUTPATIENT)
Dept: PULMONOLOGY | Age: 63
End: 2023-01-18

## 2023-01-18 VITALS
TEMPERATURE: 98.1 F | DIASTOLIC BLOOD PRESSURE: 96 MMHG | RESPIRATION RATE: 20 BRPM | HEART RATE: 91 BPM | WEIGHT: 144.8 LBS | SYSTOLIC BLOOD PRESSURE: 156 MMHG | BODY MASS INDEX: 21.95 KG/M2 | HEIGHT: 68 IN | OXYGEN SATURATION: 95 %

## 2023-01-18 DIAGNOSIS — R91.1 LUNG NODULE: Primary | ICD-10-CM

## 2023-01-18 LAB
ANION GAP SERPL CALCULATED.3IONS-SCNC: 5 MMOL/L (ref 3–16)
ANION GAP SERPL CALCULATED.3IONS-SCNC: 6 MMOL/L (ref 3–16)
ANION GAP SERPL CALCULATED.3IONS-SCNC: 9 MMOL/L (ref 3–16)
BUN BLDV-MCNC: 12 MG/DL (ref 7–20)
BUN BLDV-MCNC: 13 MG/DL (ref 7–20)
BUN BLDV-MCNC: 13 MG/DL (ref 7–20)
CALCIUM SERPL-MCNC: 8.2 MG/DL (ref 8.3–10.6)
CHLORIDE BLD-SCNC: 100 MMOL/L (ref 99–110)
CHLORIDE BLD-SCNC: 101 MMOL/L (ref 99–110)
CHLORIDE BLD-SCNC: 99 MMOL/L (ref 99–110)
CO2: 26 MMOL/L (ref 21–32)
CO2: 28 MMOL/L (ref 21–32)
CO2: 29 MMOL/L (ref 21–32)
CREAT SERPL-MCNC: 0.6 MG/DL (ref 0.8–1.3)
CREAT SERPL-MCNC: 0.6 MG/DL (ref 0.8–1.3)
CREAT SERPL-MCNC: <0.5 MG/DL (ref 0.8–1.3)
GFR SERPL CREATININE-BSD FRML MDRD: >60 ML/MIN/{1.73_M2}
GLUCOSE BLD-MCNC: 105 MG/DL (ref 70–99)
GLUCOSE BLD-MCNC: 107 MG/DL (ref 70–99)
GLUCOSE BLD-MCNC: 133 MG/DL (ref 70–99)
GLUCOSE BLD-MCNC: 140 MG/DL (ref 70–99)
GLUCOSE BLD-MCNC: 96 MG/DL (ref 70–99)
PERFORMED ON: ABNORMAL
PERFORMED ON: NORMAL
POTASSIUM SERPL-SCNC: 4.1 MMOL/L (ref 3.5–5.1)
POTASSIUM SERPL-SCNC: 4.6 MMOL/L (ref 3.5–5.1)
POTASSIUM SERPL-SCNC: 5.5 MMOL/L (ref 3.5–5.1)
SODIUM BLD-SCNC: 134 MMOL/L (ref 136–145)
SODIUM BLD-SCNC: 134 MMOL/L (ref 136–145)
SODIUM BLD-SCNC: 135 MMOL/L (ref 136–145)

## 2023-01-18 PROCEDURE — 6370000000 HC RX 637 (ALT 250 FOR IP): Performed by: INTERNAL MEDICINE

## 2023-01-18 PROCEDURE — 2580000003 HC RX 258: Performed by: INTERNAL MEDICINE

## 2023-01-18 PROCEDURE — 80048 BASIC METABOLIC PNL TOTAL CA: CPT

## 2023-01-18 PROCEDURE — 6370000000 HC RX 637 (ALT 250 FOR IP): Performed by: NURSE PRACTITIONER

## 2023-01-18 PROCEDURE — 99232 SBSQ HOSP IP/OBS MODERATE 35: CPT | Performed by: INTERNAL MEDICINE

## 2023-01-18 PROCEDURE — 36415 COLL VENOUS BLD VENIPUNCTURE: CPT

## 2023-01-18 PROCEDURE — 6360000002 HC RX W HCPCS: Performed by: INTERNAL MEDICINE

## 2023-01-18 PROCEDURE — 85025 COMPLETE CBC W/AUTO DIFF WBC: CPT

## 2023-01-18 RX ORDER — HYDROCODONE BITARTRATE AND ACETAMINOPHEN 5; 325 MG/1; MG/1
1 TABLET ORAL EVERY 6 HOURS PRN
Qty: 12 TABLET | Refills: 0 | Status: SHIPPED | OUTPATIENT
Start: 2023-01-18 | End: 2023-01-21

## 2023-01-18 RX ORDER — LEVOFLOXACIN 500 MG/1
500 TABLET, FILM COATED ORAL DAILY
Qty: 14 TABLET | Refills: 0 | Status: SHIPPED | OUTPATIENT
Start: 2023-01-18 | End: 2023-02-01

## 2023-01-18 RX ADMIN — HYDROCODONE BITARTRATE AND ACETAMINOPHEN 1 TABLET: 5; 325 TABLET ORAL at 12:48

## 2023-01-18 RX ADMIN — GUAIFENESIN 600 MG: 600 TABLET, EXTENDED RELEASE ORAL at 08:24

## 2023-01-18 RX ADMIN — ENOXAPARIN SODIUM 40 MG: 100 INJECTION SUBCUTANEOUS at 08:25

## 2023-01-18 RX ADMIN — Medication 10 ML: at 08:27

## 2023-01-18 RX ADMIN — DEXTROAMPHETAMINE SACCHARATE, AMPHETAMINE ASPARTATE, DEXTROAMPHETAMINE SULFATE, AMPHETAMINE SULFATE TABLETS, 10 MG,CLL 30 MG: 2.5; 2.5; 2.5; 2.5 TABLET ORAL at 05:14

## 2023-01-18 RX ADMIN — SODIUM ZIRCONIUM CYCLOSILICATE 10 G: 5 POWDER, FOR SUSPENSION ORAL at 08:31

## 2023-01-18 RX ADMIN — GABAPENTIN 300 MG: 300 CAPSULE ORAL at 08:24

## 2023-01-18 RX ADMIN — HYDROCODONE BITARTRATE AND ACETAMINOPHEN 1 TABLET: 5; 325 TABLET ORAL at 03:06

## 2023-01-18 RX ADMIN — ASPIRIN 81 MG: 81 TABLET, COATED ORAL at 08:25

## 2023-01-18 RX ADMIN — Medication 1 CAPSULE: at 08:24

## 2023-01-18 RX ADMIN — HYDROCODONE BITARTRATE AND ACETAMINOPHEN 1 TABLET: 5; 325 TABLET ORAL at 08:30

## 2023-01-18 ASSESSMENT — PAIN SCALES - WONG BAKER
WONGBAKER_NUMERICALRESPONSE: 0

## 2023-01-18 ASSESSMENT — PAIN DESCRIPTION - DESCRIPTORS
DESCRIPTORS: SHARP
DESCRIPTORS: SHARP

## 2023-01-18 ASSESSMENT — PAIN DESCRIPTION - FREQUENCY
FREQUENCY: CONTINUOUS
FREQUENCY: CONTINUOUS

## 2023-01-18 ASSESSMENT — PAIN DESCRIPTION - ONSET
ONSET: ON-GOING
ONSET: ON-GOING

## 2023-01-18 ASSESSMENT — PAIN SCALES - GENERAL
PAINLEVEL_OUTOF10: 8
PAINLEVEL_OUTOF10: 9
PAINLEVEL_OUTOF10: 0
PAINLEVEL_OUTOF10: 0

## 2023-01-18 ASSESSMENT — PAIN DESCRIPTION - PAIN TYPE
TYPE: ACUTE PAIN
TYPE: ACUTE PAIN

## 2023-01-18 ASSESSMENT — PAIN DESCRIPTION - LOCATION
LOCATION: BACK
LOCATION: BACK

## 2023-01-18 ASSESSMENT — PAIN DESCRIPTION - ORIENTATION
ORIENTATION: RIGHT
ORIENTATION: RIGHT

## 2023-01-18 NOTE — CARE COORDINATION
Discharge order noted. Spoke with patient at bedside. He states his daughter will come pick him up to take him home. He is asking about getting some Ensure due to he has not had much of a diet. Placed call to [facility] with Dietary and she states she will get patient some Ensure before he leaves. No other needs identified from CM.
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
## 2023-01-18 NOTE — PROGRESS NOTES
Infectious Disease Follow up Notes    CC :  pneumococcal pneumonia, bacteremia, empyema      Antibiotics:   Rocephin 2g q24  Flagyl 500 po TID     Admit Date:   1/7/2023  Hospital Day: 12    Subjective:   No fever.   On RA   Continues to feel better   No new concerns, just weak  Objective:     Patient Vitals for the past 8 hrs:   BP Temp Temp src Pulse Resp SpO2   01/18/23 1204 (!) 156/96 98.1 °F (36.7 °C) Oral 91 20 95 %   01/18/23 0800 (!) 153/94 98.2 °F (36.8 °C) Oral 89 16 94 %         EXAM:  General:   alert, oriented, NAD    HEENT:   NCAT, PERRL,  sclera anicteric  MMM, no thrush  NECK:   supple     LUNGS:   Rhonchi, rub post lung fields omid   CV:    RRR    ABD:  soft, flat, NT    EXT:  no focal rash         LINE:   PIV in place         Scheduled Meds:   albuterol  2.5 mg Nebulization Daily    guaiFENesin  600 mg Oral BID    cefTRIAXone (ROCEPHIN) IV  2,000 mg IntraVENous Q24H    lactobacillus  1 capsule Oral BID WC    aspirin  81 mg Oral Daily    enoxaparin  40 mg SubCUTAneous Daily    nicotine  1 patch TransDERmal Daily    insulin lispro  0-4 Units SubCUTAneous TID WC    insulin lispro  0-4 Units SubCUTAneous Nightly    amphetamine-dextroamphetamine  30 mg Oral BID AC    gabapentin  300 mg Oral TID       Continuous Infusions:   dextrose      sodium chloride 25 mL/hr at 01/17/23 1019          Data Review:    Lab Results   Component Value Date    WBC 12.0 (H) 01/18/2023    HGB 10.3 (L) 01/18/2023    HCT 30.6 (L) 01/18/2023    MCV 90.2 01/18/2023    PLT 1,116 (HH) 01/18/2023     Lab Results   Component Value Date    CREATININE 0.6 (L) 01/18/2023    BUN 13 01/18/2023     (L) 01/18/2023    K 4.1 01/18/2023    CL 99 01/18/2023    CO2 26 01/18/2023       Hepatic Function Panel:   Lab Results   Component Value Date/Time    ALKPHOS 116 07/23/2021 05:15 PM    ALT 23 07/23/2021 05:15 PM    AST 18 07/23/2021 05:15 PM    PROT 7.2 07/23/2021 05:15 PM    BILITOT 0.4 07/23/2021 05:15 PM    LABALBU 4.2 07/23/2021 05:15 PM         MICRO:  1/7 BC x2 S pneumoniae    Pleural fluid culture S pneumoniae   1/8 Legionella and pneumococcal ag neg   Sputum culture NRF   MRSA nasal screen neg   1/10 BC x2 neg      Streptococcus pneumoniae   Antibiotic Interpretation Microscan  Method Status    benzylpenicillin (meningitis) Sensitive <=0.06 mcg/mL BACTERIAL SUSCEPTIBILITY PANEL BY NASIMA     benzylpenicillin (oral) Sensitive <=0.06 mcg/mL BACTERIAL SUSCEPTIBILITY PANEL BY NASIMA     benzylpenicillin (pneumoniae) Sensitive <=0.06 mcg/mL BACTERIAL SUSCEPTIBILITY PANEL BY NASIMA     cefotaxime (meningitis) Sensitive <=0.12 mcg/mL BACTERIAL SUSCEPTIBILITY PANEL BY NASIMA     Cefotaxime (other) Sensitive <=0.12 mcg/mL BACTERIAL SUSCEPTIBILITY PANEL BY NASIMA     cefTRIAXone (meningitis) Sensitive <=0.12 mcg/mL BACTERIAL SUSCEPTIBILITY PANEL BY NASIMA     Ceftriaxone (other) Sensitive <=0.12 mcg/mL BACTERIAL SUSCEPTIBILITY PANEL BY NASIMA     erythromycin Sensitive <=0.12 mcg/mL BACTERIAL SUSCEPTIBILITY PANEL BY NASIMA     trimethoprim-sulfamethoxazole Sensitive <=10 mcg/mL BACTERIAL SUSCEPTIBILITY PANEL BY NASIMA     vancomycin Sensitive 0.25 mcg/mL BACTERIAL SUSCEPTIBILITY PANEL BY NASIMA       levofloxacin Sensitive 1 mcg/mL BACTERIAL SUSCEPTIBILITY PANEL BY NASIMA       IMAGING:  CXR 1/10/23  Large consolidation right lower lobe of the lung and moderate consolidation   left perihilar region. Left lung base infiltrate. Small right pleural   effusion. There is no pneumothorax. These findings are largely unchanged. Normal cardiac size. CT chest 1/8/23  Impression   Multifocal pneumonia,right greater than left with tiny pleural effusions. A more focal nodular density in the left lower lobe is indeterminate   measuring 1.2 cm in size. Recommend short-term follow-up chest CT after   treatment approximately 4-6 weeks time to assess for change.        Small mediastinal nodes are seen which may simply be reactive. CT chest 1/11/23  Impression   Moderate bilateral pleural effusions, slightly increased as compared to   prior. Partial atelectasis of the posterior left lower lobe. Multifocal bilateral airspace disease, improving within the lingula and right   lower lobe though increasing within the right upper lobe as compared to   prior. Increased size of an AP window lymph node, likely reactive. Filling defects within the bronchus intermedius and left lower lobe bronchi,   which can be due to mucoid impaction or aspiration. Indeterminate 1.4 cm left lower lobe pulmonary nodule again noted, for which   short-term follow-up chest CT after treatment and resolution of any acute   symptoms is recommended to ensure resolution. If this does not resolve, it   is of a size amenable to evaluation by PET-CT. CT chest 1/16/23  Small bilateral pleural effusions are present. These appear to be smaller   than previously seen. Impression   1. Minimal improvement in the aeration of the right upper lobe. However,   there is still bilateral consolidation. 2. The left pleural effusion is smaller than previously seen. No   pneumothorax. 3. The right pleural effusion is unchanged. 4. The other findings seen on the recent chest CT a unchanged.        Assessment:     Patient Active Problem List    Diagnosis Date Noted    Septicemia ([facility] 01/16/2023     Priority: Medium    Lung nodule 01/16/2023     Priority: Medium    Mediastinal adenopathy 01/16/2023     Priority: Medium    Hyponatremia 01/16/2023     Priority: Medium    Bandemia 01/10/2023     Priority: Medium    Thrombocytosis 01/10/2023     Priority: Medium    Normocytic normochromic anemia 01/10/2023     Priority: Medium    Uncontrolled hypertension 01/10/2023     Priority: Medium    Elevated BUN 01/10/2023     Priority: Medium    Elevated procalcitonin 01/10/2023     Priority: Medium    Pleuritic chest pain 01/10/2023 Priority: Medium    Hemoptysis 01/10/2023     Priority: Medium    Bacteremia due to Streptococcus pneumoniae 01/10/2023     Priority: Medium    Pneumonia of both lungs due to Streptococcus pneumoniae ([facility] 01/09/2023     Priority: Medium    Empyema ([address] 01/09/2023     Priority: Medium    Leukocytosis 01/09/2023     Priority: Medium    DM2 (diabetes mellitus, type 2) ([facility] 01/07/2023     Priority: Medium    Smoker 01/07/2023     Priority: Medium    ADHD 01/07/2023     Priority: Medium    History of sleeve gastrectomy 01/07/2023     Priority: Medium    Pneumonia of right lower lobe due to infectious organism 01/07/2023     Priority: Medium    Hypoxia 12/13/2017    Bronchitis 12/13/2017    CAP (community acquired pneumonia) due to Chlamydia species 12/13/2017       History of smoking, HTN     Admitted 1/7/23 with severe multifocal pneumococcal pneumonia with bacteremia and empyema     Nodular density LLL of unclear significane for which R  adiology has suggested short term follow-up CT      WBC and plt are both now trending down  He is AF      History of C diff infection 3/2020 (+PCR, Rx vanc, no recurrence)     NKDA     Plan:     Labs are stable   He is clinically improved     Ok for DC on po levofloxacin x 2 more weeks    Labs in 1 week, order entered and d/w patient     He has my card and will call w concerns    Repeat CT per Pulm    Discussed with patient/family, all questions answered        Dorinda Sanchez MD  Phone: [phone]   Fax : [phone]

## 2023-01-18 NOTE — PROGRESS NOTES
Pt d/c'd home. Removed  IV and stopped bleeding. Catheter intact. Pt tolerated well. No redness noted at site. Notified [facility] and removed tele box. Reviewed d/c instructions, home meds, and  f/u information utilizing teach-back method. Patient verbalized understanding. Patient instructed to  prescriptions from out pt pharmacy. Patient wheeled to front entrance with all belongings.

## 2023-01-18 NOTE — DISCHARGE SUMMARY
Hospital Medicine Discharge Summary    Patient ID: Chucho Whitmore      Patient's PCP: Geralyn Gottron, MD    Admit Date: 1/7/2023     Discharge Date: 1/18/2023      Admitting Provider: Tony Goode MD     Discharge Provider: Hunter Justin MD     Discharge Diagnoses: Active Hospital Problems    Diagnosis     Septicemia ([facility] [A41.9]      Priority: Medium    Lung nodule [R91.1]      Priority: Medium    Mediastinal adenopathy [R59.0]      Priority: Medium    Hyponatremia [E87.1]      Priority: Medium    Bandemia [D72.825]      Priority: Medium    Thrombocytosis [D75.839]      Priority: Medium    Normocytic normochromic anemia [D64.9]      Priority: Medium    Uncontrolled hypertension [I10]      Priority: Medium    Elevated BUN [R79.9]      Priority: Medium    Elevated procalcitonin [R79.89]      Priority: Medium    Pleuritic chest pain [R07.81]      Priority: Medium    Hemoptysis [R04.2]      Priority: Medium    Bacteremia due to Streptococcus pneumoniae [R78.81, B95.3]      Priority: Medium    Empyema (HCC) [J86.9]      Priority: Medium    Leukocytosis [D72.829]      Priority: Medium    Pneumonia of right lower lobe due to Streptococcus pneumoniae ([facility] [J13]      Priority: Medium    DM2 (diabetes mellitus, type 2) ([facility] [E11.9]      Priority: Medium    Smoker [F17.200]      Priority: Medium    ADHD [F90.9]      Priority: Medium    History of sleeve gastrectomy [Z90.3]      Priority: Medium    Pneumonia of right lower lobe due to infectious organism [J18.9]      Priority: Medium       The patient was seen and examined on day of discharge and this discharge summary is in conjunction with any daily progress note from day of discharge. Hospital Course:   Chucho Whitmore is a 65 y.o. male. He presented to the eR from home. He c/o feeling ill for about a week. He describes feeling constitutionally ill w/ fatigue, malaise, loss of appetite.   Along the way he has had a worsening cough, worsening dyspnea, and pretty marked right-sided pleuritic chest pain along the lateral and anterolater right costal margin. CXR with mild central pulmonary congestion. Small right pleural effusion. Thoracentesis with 300 ml fluid removed. Sepsis 2/2 strep pneumonia, bacteremia, empyema  - no plan for draining fluid at this time  - ID consulted  - pulm consulted  - improved with vest therapy  - abx changed to levaquin to complete course    Acute hypoxic respiratory failure  - 2/2 above  - weaned to room air    Hyponatremia  - nephrology consulted  - s/p IVF  - Na improved    Hyperkalemia  - nephrology consulted  - s/p lokelma  - stopped home lisinopril    Thrombocytosis  - Hem/onc consulted  - likely reactive  - send off labs pending     HTN  - well controlled  - stopped home lisinopril    HLD  - continue home statin    DMII  - well controlled  - resume home regimen    ADHD  - continue adderall    Chronic back pain   - continue gabapentin    Physical Exam Performed:     BP (!) 156/96   Pulse 91   Temp 98.1 °F (36.7 °C) (Oral)   Resp 20   Ht 5' 8\" (1.727 m)   Wt 144 lb 12.8 oz (65.7 kg)   SpO2 95%   BMI 22.02 kg/m²       General appearance: No apparent distress, appears stated age and cooperative. HEENT: Pupils equal, round, and reactive to light. Conjunctivae/corneas clear. Neck: Supple, with full range of motion. No jugular venous distention. Trachea midline. Respiratory:  Normal respiratory effort. Crackles in right lung base with some improvement   Cardiovascular: Regular rate and rhythm with normal S1/S2 without murmurs, rubs or gallops. Abdomen: Soft, non-tender, non-distended with normal bowel sounds. Musculoskeletal: No clubbing, cyanosis or edema bilaterally. Full range of motion without deformity. Skin: Skin color, texture, turgor normal.  No rashes or lesions. Neurologic:  Neurovascularly intact without any focal sensory/motor deficits.  Cranial nerves: II-XII intact, grossly non-focal.  Psychiatric: Alert and oriented, thought content appropriate, normal insight  Capillary Refill: Brisk, 3 seconds, normal   Peripheral Pulses: +2 palpable, equal bilaterally       Labs: For convenience and continuity at follow-up the following most recent labs are provided:      CBC:    Lab Results   Component Value Date/Time    WBC 12.0 01/18/2023 11:58 AM    HGB 10.3 01/18/2023 11:58 AM    HCT 30.6 01/18/2023 11:58 AM    PLT 1,116 01/18/2023 11:58 AM       Renal:    Lab Results   Component Value Date/Time     01/18/2023 11:39 AM    K 4.1 01/18/2023 11:39 AM    K 4.3 03/23/2020 12:30 PM    CL 99 01/18/2023 11:39 AM    CO2 26 01/18/2023 11:39 AM    BUN 13 01/18/2023 11:39 AM    CREATININE 0.6 01/18/2023 11:39 AM    CALCIUM 8.2 01/18/2023 11:39 AM         Significant Diagnostic Studies    Radiology:   CT CHEST WO CONTRAST   Final Result   1. Minimal improvement in the aeration of the right upper lobe. However,   there is still bilateral consolidation. 2. The left pleural effusion is smaller than previously seen. No   pneumothorax. 3. The right pleural effusion is unchanged. 4. The other findings seen on the recent chest CT a unchanged. CT CHEST WO CONTRAST   Final Result   Similar small to moderate right and small left pleural effusions. Again seen are bilateral airspace opacities, right greater than left,   compatible with infection. Similar appearance of a 1.4 cm left lower lobe pulmonary nodule. Similar mild mediastinal lymphadenopathy. Similar ectasia of the ascending thoracic aorta and mild dilation of the main   pulmonary artery. RECOMMENDATIONS:   Recommend a short-term follow-up chest CT after resolution of acute symptoms,   to further assess the indeterminate left lower lobe pulmonary nodule. XR CHEST PORTABLE   Final Result   Relatively stable pattern of pneumonia in the right lung.          XR CHEST PORTABLE   Final Result   Slightly improved aeration in the lungs from prior exam.  Dense airspace   changes persist in the right lower lung zone with a small pleural effusion. CT CHEST WO CONTRAST   Final Result   Moderate bilateral pleural effusions, slightly increased as compared to   prior. Partial atelectasis of the posterior left lower lobe. Multifocal bilateral airspace disease, improving within the lingula and right   lower lobe though increasing within the right upper lobe as compared to   prior. Increased size of an AP window lymph node, likely reactive. Filling defects within the bronchus intermedius and left lower lobe bronchi,   which can be due to mucoid impaction or aspiration. Indeterminate 1.4 cm left lower lobe pulmonary nodule again noted, for which   short-term follow-up chest CT after treatment and resolution of any acute   symptoms is recommended to ensure resolution. If this does not resolve, it   is of a size amenable to evaluation by PET-CT. XR CHEST (2 VW)   Final Result   Bilateral pneumonias without any improvement. Follow-up advised. CT CHEST W CONTRAST   Final Result   Multifocal pneumonia,right greater than left with tiny pleural effusions. A more focal nodular density in the left lower lobe is indeterminate   measuring 1.2 cm in size. Recommend short-term follow-up chest CT after   treatment approximately 4-6 weeks time to assess for change. Small mediastinal nodes are seen which may simply be reactive. RECOMMENDATIONS:   *pulmonary management*         XR CHEST PORTABLE   Final Result   No pneumothorax identified. Redemonstration of bilateral airspace disease,   right greater than left. XR CHEST PORTABLE   Final Result   Mild central pulmonary congestion      Moderate airspace opacity right lung base and hazy opacities along the left   upper lobe and left lung base which could represent multisegmental   bronchopneumonia.   Recommend short-term follow-up Small questionable small right pleural effusion. 1.6 cm nodule left lung base laterally which is more apparent could represent   a small neoplasm. Recommend CT correlation                Consults:     IP CONSULT TO HOSPITALIST  IP CONSULT TO INFECTIOUS DISEASES  IP CONSULT TO PULMONOLOGY  IP CONSULT TO ONCOLOGY  IP CONSULT TO NEPHROLOGY    Disposition:  home     Condition at Discharge: Stable    Discharge Instructions/Follow-up:  Follow up with PCP, ID, pulmonology, Hem/Onc within 1-2 weeks    Code Status:  Full Code     Activity: activity as tolerated    Diet: regular diet      Discharge Medications:     Discharge Medication List as of 1/18/2023  2:00 PM             Details   levoFLOXacin (LEVAQUIN) 500 MG tablet Take 1 tablet by mouth daily for 14 days, Disp-14 tablet, R-0Normal      HYDROcodone-acetaminophen (NORCO) 5-325 MG per tablet Take 1 tablet by mouth every 6 hours as needed for Pain for up to 3 days. Max Daily Amount: 4 tablets, Disp-12 tablet, R-0Normal                Details   gabapentin (NEURONTIN) 300 MG capsule TAKE 1 CAPSULE BY MOUTH THREE TIMES DAILY. Historical Med      simvastatin (ZOCOR) 40 MG tablet Take 40 mg by mouth nightlyHistorical Med      ondansetron (ZOFRAN) 4 MG tablet Take 1 tablet by mouth every 8 hours as needed for Nausea, Disp-20 tablet, R-0Print      dicyclomine (BENTYL) 10 MG capsule Take 1 capsule by mouth every 6 hours as needed (cramps), Disp-20 capsule, R-0Print      albuterol sulfate  (90 Base) MCG/ACT inhaler Inhale 2 puffs into the lungs every 4 hours as needed for Wheezing, Disp-1 Inhaler, R-0Print      fluticasone (FLONASE) 50 MCG/ACT nasal spray 1 spray by Nasal route daily for 10 days, Disp-1 Bottle, R-0Print      amphetamine-dextroamphetamine (ADDERALL) 30 MG tablet TAKE 1 TABLET BY MOUTH EVERY MORNING AND AFTERNOON, R-0Historical Med      aspirin 81 MG tablet Take 81 mg by mouth daily.              Time Spent on discharge: 38 minutes in the examination, evaluation, counseling and review of medications and discharge plan. Signed:    Michelle Hi MD   1/18/2023      Thank you Shakira Thomas MD for the opportunity to be involved in this patient's care. If you have any questions or concerns, please feel free to contact me at [phone].

## 2023-01-18 NOTE — TELEPHONE ENCOUNTER
Katelyn Inman MD sent to [facility] Clinical Staff  Follow-up in 6 weeks with CT chest  Pt. Still IP will call in 2 days if he is discharged.

## 2023-01-18 NOTE — PROGRESS NOTES
[facility] Progress Note       CC: hyponatremia  HPI   The patient is a 80-year-old  male  patient who presented to [facility] complaining of cough  and shortness of breath. The patient was diagnosed with pneumonia and  his renal panel revealed hyponatremia with a serum sodium of 124 mEq /l, which prompted Nephrology consultation.     SUBJECTIVE  Overall doing well  Sodium improved to 135  Potassium 5.5    SOC: no visitors      Scheduled Meds:   albuterol  2.5 mg Nebulization Daily    guaiFENesin  600 mg Oral BID    cefTRIAXone (ROCEPHIN) IV  2,000 mg IntraVENous Q24H    lactobacillus  1 capsule Oral BID WC    aspirin  81 mg Oral Daily    enoxaparin  40 mg SubCUTAneous Daily    nicotine  1 patch TransDERmal Daily    insulin lispro  0-4 Units SubCUTAneous TID WC    insulin lispro  0-4 Units SubCUTAneous Nightly    amphetamine-dextroamphetamine  30 mg Oral BID AC    gabapentin  300 mg Oral TID     Continuous Infusions:   dextrose      sodium chloride 25 mL/hr at 01/17/23 1019     PRN Meds:HYDROcodone 5 mg - acetaminophen, ibuprofen, acetylcysteine, glucose, dextrose bolus **OR** dextrose bolus, glucagon (rDNA), dextrose, sodium chloride flush, sodium chloride, magnesium sulfate, ondansetron **OR** ondansetron, acetaminophen **OR** acetaminophen, melatonin, calcium carbonate, ipratropium-albuterol, albuterol, benzonatate      Objective:      Physical Exam  Wt Readings from Last 3 Encounters:   01/18/23 144 lb 12.8 oz (65.7 kg)   04/18/22 160 lb (72.6 kg)   07/23/21 250 lb (113.4 kg)     Temp Readings from Last 3 Encounters:   01/18/23 98.2 °F (36.8 °C) (Oral)   04/18/22 98.1 °F (36.7 °C)   07/23/21 98.8 °F (37.1 °C) (Oral)     BP Readings from Last 3 Encounters:   01/18/23 (!) 153/94   04/18/22 (!) 148/82   07/23/21 (!) 146/96     Pulse Readings from Last 3 Encounters:   01/18/23 89   04/18/22 70   07/23/21 92    GENERAL APPEARANCE:  The patient is alert and oriented x3, not in acute  distress. LUNGS:  Clear to anterior auscultation bilaterally, nonlabored  breathing. CARDIOVASCULAR EXAM:   S1 and S2, regular rate and rhythm. No  added murmurs or rubs. No peripheral edema. ABDOMINAL EXAM:  soft abdomen. No organomegaly. SKIN:  no lesions or rashes. Warm to touch. [facility] Billing LYMPHATICS: no cervical or axillary adenopathies. Lab Review   Lab Results   Component Value Date    WBC 12.2 (H) 01/17/2023    HGB 10.2 (L) 01/17/2023    HCT 30.5 (L) 01/17/2023    MCV 90.7 01/17/2023    PLT 1,100 (HH) 01/17/2023     Lab Results   Component Value Date/Time     01/18/2023 04:20 AM    K 5.5 01/18/2023 04:20 AM    K 4.3 03/23/2020 12:30 PM     01/18/2023 04:20 AM    CO2 29 01/18/2023 04:20 AM    BUN 13 01/18/2023 04:20 AM    CREATININE <0.5 01/18/2023 04:20 AM    GLUCOSE 133 01/18/2023 04:20 AM    CALCIUM 8.2 01/18/2023 04:20 AM          Patient Active Problem List    Diagnosis Date Noted    Septicemia ([facility] [address] 01/16/2023    Lung nodule 01/16/2023    Mediastinal adenopathy 01/16/2023    Hyponatremia 01/16/2023    Bandemia 01/10/2023    Thrombocytosis 01/10/2023    Normocytic normochromic anemia 01/10/2023    Uncontrolled hypertension 01/10/2023    Elevated BUN 01/10/2023    Elevated procalcitonin 01/10/2023    Pleuritic chest pain 01/10/2023    Hemoptysis 01/10/2023    Bacteremia due to Streptococcus pneumoniae 01/10/2023    Pneumonia of both lungs due to Streptococcus pneumoniae ([facility] 01/09/2023    Empyema ([address] 01/09/2023    Leukocytosis 01/09/2023    DM2 (diabetes mellitus, type 2) ([facility] [address] 01/07/2023    Smoker 01/07/2023    ADHD 01/07/2023    History of sleeve gastrectomy 01/07/2023    Pneumonia of right lower lobe due to infectious organism 01/07/2023    Hypoxia 12/13/2017    Bronchitis 12/13/2017    CAP (community acquired pneumonia) due to Chlamydia species 12/13/2017       ASSESSMENT AND PLAN   1.   Hyponatremia, multifactorial and likely secondary to increased free  water intake along with decreased solids.  -Na trending up , at 135 now    2.  Hyperkalemia  -stopped the  lisinopril   -low potassium diet.  -stopped  the K supplements 1/17  -Lokelma and repeat BMP at 1 PM    3.  Pneumonia, currently on antibiotics per primary team and ID.

## 2023-01-19 LAB
BASOPHILS ABSOLUTE: 0.2 K/UL (ref 0–0.2)
BASOPHILS RELATIVE PERCENT: 1.3 %
EOSINOPHILS ABSOLUTE: 0.1 K/UL (ref 0–0.6)
EOSINOPHILS RELATIVE PERCENT: 0.5 %
HCT VFR BLD CALC: 30.6 % (ref 40.5–52.5)
HEMATOLOGY PATH CONSULT: NO
HEMOGLOBIN: 10.3 G/DL (ref 13.5–17.5)
LYMPHOCYTES ABSOLUTE: 1.3 K/UL (ref 1–5.1)
LYMPHOCYTES RELATIVE PERCENT: 11.2 %
MCH RBC QN AUTO: 30.4 PG (ref 26–34)
MCHC RBC AUTO-ENTMCNC: 33.7 G/DL (ref 31–36)
MCV RBC AUTO: 90.2 FL (ref 80–100)
MONOCYTES ABSOLUTE: 1 K/UL (ref 0–1.3)
MONOCYTES RELATIVE PERCENT: 8 %
NEUTROPHILS ABSOLUTE: 9.5 K/UL (ref 1.7–7.7)
NEUTROPHILS RELATIVE PERCENT: 79 %
PDW BLD-RTO: 14.4 % (ref 12.4–15.4)
PLATELET # BLD: 1116 K/UL (ref 135–450)
PMV BLD AUTO: 6.7 FL (ref 5–10.5)
RBC # BLD: 3.39 M/UL (ref 4.2–5.9)
WBC # BLD: 12 K/UL (ref 4–11)

## 2023-01-20 LAB
JAK2 QNT, SOURCE: NORMAL
JAK2 V617F MUTATION: NOT DETECTED
JAK2 V617F PERCENT MUTATED ALLELES: 0 %

## 2023-02-14 ENCOUNTER — HOSPITAL ENCOUNTER (EMERGENCY)
Age: 63
Discharge: HOME | End: 2023-02-15
Payer: MEDICARE

## 2023-02-14 ENCOUNTER — APPOINTMENT (OUTPATIENT)
Dept: GENERAL RADIOLOGY | Age: 63
End: 2023-02-14
Payer: MEDICARE

## 2023-02-14 VITALS
HEART RATE: 67 BPM | HEIGHT: 68 IN | OXYGEN SATURATION: 96 % | SYSTOLIC BLOOD PRESSURE: 169 MMHG | WEIGHT: 150 LBS | DIASTOLIC BLOOD PRESSURE: 86 MMHG | RESPIRATION RATE: 16 BRPM | BODY MASS INDEX: 22.73 KG/M2 | TEMPERATURE: 98.5 F

## 2023-02-14 DIAGNOSIS — S61.011A LACERATION OF RIGHT THUMB WITHOUT DAMAGE TO NAIL, FOREIGN BODY PRESENCE UNSPECIFIED, INITIAL ENCOUNTER: Primary | ICD-10-CM

## 2023-02-14 DIAGNOSIS — S61.209A OPEN WOUND OF FINGER WITH TENDON INJURY, INITIAL ENCOUNTER: ICD-10-CM

## 2023-02-14 PROCEDURE — 73120 X-RAY EXAM OF HAND: CPT

## 2023-02-14 PROCEDURE — 12001 RPR S/N/AX/GEN/TRNK 2.5CM/<: CPT

## 2023-02-14 PROCEDURE — 6370000000 HC RX 637 (ALT 250 FOR IP): Performed by: PHYSICIAN ASSISTANT

## 2023-02-14 PROCEDURE — 99283 EMERGENCY DEPT VISIT LOW MDM: CPT

## 2023-02-14 RX ORDER — HYDROCODONE BITARTRATE AND ACETAMINOPHEN 7.5; 325 MG/1; MG/1
1 TABLET ORAL ONCE
Status: COMPLETED | OUTPATIENT
Start: 2023-02-14 | End: 2023-02-14

## 2023-02-14 RX ADMIN — HYDROCODONE BITARTRATE AND ACETAMINOPHEN 1 TABLET: 7.5; 325 TABLET ORAL at 23:09

## 2023-02-14 ASSESSMENT — PAIN DESCRIPTION - DESCRIPTORS
DESCRIPTORS: THROBBING
DESCRIPTORS: ACHING;THROBBING

## 2023-02-14 ASSESSMENT — PAIN DESCRIPTION - LOCATION
LOCATION: BACK;HAND
LOCATION: HAND;LEG;BACK

## 2023-02-14 ASSESSMENT — PAIN DESCRIPTION - ORIENTATION
ORIENTATION: RIGHT
ORIENTATION: RIGHT

## 2023-02-14 ASSESSMENT — PAIN DESCRIPTION - FREQUENCY: FREQUENCY: CONTINUOUS

## 2023-02-14 ASSESSMENT — PAIN SCALES - GENERAL
PAINLEVEL_OUTOF10: 10
PAINLEVEL_OUTOF10: 10

## 2023-02-14 ASSESSMENT — PAIN - FUNCTIONAL ASSESSMENT: PAIN_FUNCTIONAL_ASSESSMENT: 0-10

## 2023-02-14 ASSESSMENT — PAIN DESCRIPTION - PAIN TYPE: TYPE: ACUTE PAIN

## 2023-02-15 PROCEDURE — 6370000000 HC RX 637 (ALT 250 FOR IP): Performed by: PHYSICIAN ASSISTANT

## 2023-02-15 RX ORDER — CEPHALEXIN 250 MG/1
500 CAPSULE ORAL ONCE
Status: COMPLETED | OUTPATIENT
Start: 2023-02-15 | End: 2023-02-15

## 2023-02-15 RX ORDER — CEPHALEXIN 500 MG/1
500 CAPSULE ORAL 4 TIMES DAILY
Qty: 20 CAPSULE | Refills: 0 | Status: SHIPPED | OUTPATIENT
Start: 2023-02-15 | End: 2023-02-20

## 2023-02-15 RX ADMIN — CEPHALEXIN 500 MG: 250 CAPSULE ORAL at 01:31

## 2023-02-15 ASSESSMENT — PAIN DESCRIPTION - PAIN TYPE: TYPE: CHRONIC PAIN

## 2023-02-15 NOTE — ED NOTES
Completed wound care top Patients Right thumb using sterile techniques. Used richie hex and saline to clean laceration. Irrigated laceration with 400 ml of saline. Wesley HILLIARD aware.      Cheikh Lim  02/14/23 0509

## 2023-02-15 NOTE — ED PROVIDER NOTES
[address] [address]  ED  EMERGENCY DEPARTMENT ENCOUNTER        Pt Name: Radha Cordero Covert  MRN: [id number]  [place] 1960  Date of evaluation: 2/14/2023  Provider: ARMINDA Blevins  PCP: Cheryl Villeda MD  Note Started: 3:34 AM EST 2/15/23      KRYS. I have evaluated this patient. My supervising physician was available for consultation. CHIEF COMPLAINT       Chief Complaint   Patient presents with    Hand Injury     Started having back pain (has sciatic nerve pain), fell and hit right on patio table. States fell on face and busted glasses. No LOC. HISTORY OF PRESENT ILLNESS: 1 or more Elements     History from : Patient        Ghulam Skaggs is a 65 y.o. male who presents for hand injury. The patient states he has chronic back pain with right sciatic pain. He states he typically uses a walking stick or cane. Today he is on his porch eating dinner and did not have his walking stick or cane, he felt a sharp pain going down his right leg and his leg went numb. He then fell trying to catch himself on his glass table which ultimately shattered. Patient has laceration to the base of his right thumb. He did hit his head but denies any loss of consciousness, nausea or vomiting. He states the back pain, leg pain and numbness in his leg are chronic issues. He does see a spine specialist and has had multiple injections in his back. Again denies    Nursing Notes were all reviewed and agreed with or any disagreements were addressed in the HPI. REVIEW OF SYSTEMS :      Review of Systems    Positives and Pertinent negatives as per HPI.      SURGICAL HISTORY     Past Surgical History:   Procedure Laterality Date    ABDOMEN SURGERY      ANKLE SURGERY Right     HAND SURGERY Left     KNEE SURGERY Right     ROTATOR CUFF REPAIR Right        CURRENTMEDICATIONS       Discharge Medication List as of 2/15/2023  1:33 AM        CONTINUE these medications which have NOT CHANGED    Details gabapentin (NEURONTIN) 300 MG capsule TAKE 1 CAPSULE BY MOUTH THREE TIMES DAILY. Historical Med      simvastatin (ZOCOR) 40 MG tablet Take 40 mg by mouth nightlyHistorical Med      ondansetron (ZOFRAN) 4 MG tablet Take 1 tablet by mouth every 8 hours as needed for Nausea, Disp-20 tablet, R-0Print      dicyclomine (BENTYL) 10 MG capsule Take 1 capsule by mouth every 6 hours as needed (cramps), Disp-20 capsule, R-0Print      albuterol sulfate  (90 Base) MCG/ACT inhaler Inhale 2 puffs into the lungs every 4 hours as needed for Wheezing, Disp-1 Inhaler, R-0Print      fluticasone (FLONASE) 50 MCG/ACT nasal spray 1 spray by Nasal route daily for 10 days, Disp-1 Bottle, R-0Print      amphetamine-dextroamphetamine (ADDERALL) 30 MG tablet TAKE 1 TABLET BY MOUTH EVERY MORNING AND AFTERNOON, R-0Historical Med      aspirin 81 MG tablet Take 81 mg by mouth daily. ALLERGIES     Patient has no known allergies. FAMILYHISTORY     No family history on file. SOCIAL HISTORY       Social History     Tobacco Use    Smoking status: Every Day     Packs/day: 0.50     Types: Cigarettes    Smokeless tobacco: Never   Vaping Use    Vaping Use: Never used   Substance Use Topics    Alcohol use: No    Drug use: No       SCREENINGS        [place] Coma Scale  Eye Opening: Spontaneous  Best Verbal Response: Oriented  Best Motor Response: Obeys commands  [place] Coma Scale Score: 15                CIWA Assessment  BP: (!) 169/86  Heart Rate: 67           PHYSICAL EXAM  1 or more Elements     ED Triage Vitals [02/14/23 2205]   BP Temp Temp Source Heart Rate Resp SpO2 Height Weight   (!) 169/86 98.5 °F (36.9 °C) Oral 67 16 96 % 5' 8\" (1.727 m) 150 lb (68 kg)       Physical Exam  Vitals and nursing note reviewed. Constitutional:       Appearance: Normal appearance. He is not diaphoretic. HENT:      Head: Normocephalic and atraumatic.       Nose: Nose normal.      Mouth/Throat:      Mouth: Mucous membranes are moist.   Eyes: General:         Right eye: No discharge. Left eye: No discharge. Extraocular Movements: Extraocular movements intact. Pulmonary:      Effort: Pulmonary effort is normal. No respiratory distress. Musculoskeletal:         General: Normal range of motion. Cervical back: Normal range of motion and neck supple. Comments: Laceration to base of right thumb. There is evidence of tendon involvement. Patient has difficulty with flexion and extension secondary to pain however he is able to perform the motions. He mostly struggles with finger to thumb opposition. He can perform some of the movements I suspect tendon is only partially lacerated. Bleeding is well controlled. No evidence of foreign body that I could see. Skin:     General: Skin is warm and dry. Coloration: Skin is not pale. Neurological:      Mental Status: He is alert and oriented to person, place, and time. Psychiatric:         Mood and Affect: Mood normal.         Behavior: Behavior normal.           DIAGNOSTIC RESULTS   LABS:    Labs Reviewed - No data to display    When ordered only abnormal lab results are displayed. All other labs were within normal range or not returned as of this dictation. EKG: When ordered, EKG's are interpreted by the Emergency Department Physician in the absence of a cardiologist.  Please see their note for interpretation of EKG. RADIOLOGY:   Non-plain film images such as CT, Ultrasound and MRI are read by the radiologist. Plain radiographic images are visualized and preliminarily interpreted by the ED Provider with the below findings:    No bony abnormality. Just read possible retained foreign bodies however I reviewed imaging and do not see any evidence of foreign bodies and physical exam cannot find any foreign bodies.     Interpretation per the Radiologist below, if available at the time of this note:    XR HAND RIGHT (2 VIEWS)   Final Result   A laceration is noted at the base of the right thumb with punctate associated   radiodensities which may represent retained foreign bodies. No evidence of   underlying osseous abnormality. XR HAND RIGHT (2 VIEWS)    Result Date: 2/15/2023  EXAMINATION: THREE XRAY VIEWS OF THE RIGHT HAND 2/14/2023 11:24 pm COMPARISON: None. HISTORY: ORDERING SYSTEM PROVIDED HISTORY: laceration TECHNOLOGIST PROVIDED HISTORY: Reason for exam:->laceration Reason for Exam: Hand Injury (Started having back pain (has sciatic nerve pain), fell and hit right on patio table. States fell on face and busted glasses. No LOC. ) FINDINGS: No evidence of acute fracture or dislocation. 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 Bony mineralization is normal. Laceration of the right thumb base is noted with trace subcutaneous gas. There are tiny radiodense structures of the laceration site which may represent retained foreign bodies. A laceration is noted at the base of the right thumb with punctate associated radiodensities which may represent retained foreign bodies. No evidence of underlying osseous abnormality. No results found. PROCEDURES   Unless otherwise noted below, none     Procedures    CRITICAL CARE TIME (.cctime)   There was a high probability of life-threatening clinical deterioration in the patient's condition requiring my urgent intervention. I personally saw the patient and independently provided 12 minutes of non-concurrent critical care out of the total shared critical care time provided, excluding separately reportable procedures. PAST MEDICAL HISTORY      has a past medical history of Anxiety, Clostridium difficile infection (03/23/2020), Clostridium difficile infection (03/23/2020), GERD (gastroesophageal reflux disease), and Hypertension.      Chronic Conditions affecting Care: none    EMERGENCY DEPARTMENT COURSE and DIFFERENTIAL DIAGNOSIS/MDM:   Vitals:    Vitals:    02/14/23 2205   BP: (!) 169/86   Pulse: 67   Resp: 16   Temp: 98.5 °F (36.9 °C)   TempSrc: Oral   SpO2: 96%   Weight: 150 lb (68 kg)   Height: 5' 8\" (1.727 m)       Patient was given the following medications:  Medications   HYDROcodone-acetaminophen (NORCO) 7.5-325 MG per tablet 1 tablet (1 tablet Oral Given 2/14/23 5149)   cephALEXin (KEFLEX) capsule 500 mg (500 mg Oral Given 2/15/23 0131)             Is this patient to be included in the SEP-1 Core Measure due to severe sepsis or septic shock? No   Exclusion criteria - the patient is NOT to be included for SEP-1 Core Measure due to: Infection is not suspected    CONSULTS: (Who and What was discussed)  IP CONSULT TO ORTHOPEDIC SURGERY              CC/HPI Summary, DDx, ED Course, and Reassessment: Patient was evaluated in the emergency department today for laceration to right thumb. I do have concern for tendon laceration therefore orthopedics will be consulted. An x-ray was performed which shows no acute bony abnormality but reads possible retained foreign bodies however I reviewed the imaging and did not see the evidence of foreign bodies and also on exam I am unable to find any fragments of foreign bodies. Wound was thoroughly cleansed by nursing staff. It was numbed using 2% lidocaine approximately 5 mL directly into the wound. He also received oral Norco.    The risks and benefits of laceration repair were discussed with the patient. Questions were sought and answered and verbal consent was given for the procedure. The area was prepped and draped in standard bedside fashion. The wound area was anesthetized with 5ml of Lidocaine 2% without epinephrine without added sodium bicarbonate. The wound was explored with No foreign bodies found, Extensor tendon laceration seen. The wound was repaired with 4-0 Ethilon; 5 simple interrupted sutures were used.  The patient tolerated the procedure well without complications and my repeat neurovascular exam post-procedure is unchanged. Wound care and scar minimization education was provided. Instructions were given to return for increasing pain, redness, streaking, discharge, or any other worsening or worrisome concerns. I spoke with Dr. Abdon Lopez with ortho who recommended thumb spica splint, antibiotics and close follow-up with Dr. Melva Sanhi who is a hand specialist.  Discussed treatment plan with the patient and gave him very precise instructions how to care for the wound and who to follow-up with. He expresses understanding. Return precautions are also given should he develop any new or worsening symptoms. He is already up-to-date on his tetanus. All questions are answered and he is discharged home in good condition. Disposition Considerations (include 1 Tests not done, Shared Decision Making, Pt Expectation of Test or Tx.): Consider further work-up for patient's fall however he has known back problems with sciatica and this is not a new issue. He uses a walking stick or cane at home to ensure you are safety. I estimate there is LOW risk for CELLULITIS, COMPARTMENT SYNDROME, NECROTIZING FASCIITIS, TENDON OR NEUROVASCULAR INJURY, or FOREIGN BODY, thus I consider the discharge disposition reasonable. Also, there is no evidence or peritonitis, sepsis, or toxicity. Gerber Morton Covert and I have discussed the diagnosis and risks, and we agree with discharging home to follow-up with their primary doctor. We also discussed returning to the Emergency Department immediately if new or worsening symptoms occur. We have discussed the symptoms which are most concerning (e.g., changing or worsening pain, fever, numbness, weakness, cool or painful digits) that necessitate immediate return. Final Impression    1. Laceration of right thumb without damage to nail, foreign body presence unspecified, initial encounter    2.  Open wound of finger with tendon injury, initial encounter        Discharge Vital Signs:  Blood pressure (!) 169/86, pulse 67, temperature 98.5 °F (36.9 °C), temperature source Oral, resp. rate 16, height 5' 8\" (1.727 m), weight 150 lb (68 kg), SpO2 96 %. I am the Primary Clinician of Record. FINAL IMPRESSION      1. Laceration of right thumb without damage to nail, foreign body presence unspecified, initial encounter    2.  Open wound of finger with tendon injury, initial encounter          DISPOSITION/PLAN     DISPOSITION Decision To Discharge 02/15/2023 01:21:47 AM      PATIENT REFERRED TO:  [facility]  [address]  [address] [place] [address] [address]  Go to   If symptoms worsen    Ross Irwin MD  [address] [facility] [address]  [phone]    Call today      Ross Irwin MD  [address] [facility] [address]  [phone]            DISCHARGE MEDICATIONS:  Discharge Medication List as of 2/15/2023  1:33 AM        START taking these medications    Details   cephALEXin (KEFLEX) 500 MG capsule Take 1 capsule by mouth 4 times daily for 5 days, Disp-20 capsule, R-0Normal             DISCONTINUED MEDICATIONS:  Discharge Medication List as of 2/15/2023  1:33 AM                 (Please note that portions of this note were completed with a voice recognition program.  Efforts were made to edit the dictations but occasionally words are mis-transcribed.)    ARMINDA Gautam (electronically signed)           ARMINDA Gautam  02/15/23 [id number]

## 2023-02-15 NOTE — DISCHARGE INSTRUCTIONS
You were seen in the emergency department today for a laceration to your right thumb. We do have concern for a tendon injury. This will greatly impact your range of motion of your thumb. Please follow-up with the hand specialist for further evaluation and treatment. Stitches will need to be removed in the next 7 to 10 days. Keep wound clean, dry and covered. Change the bandage once daily or any time your hand gets wet or dirty. Monitor for signs of infection such as increasing redness, swelling, drainage, fever or chills. Return to the emergency department for any new or worsening symptoms. I have prescribed an antibiotic for infection prevention.

## 2023-02-15 NOTE — ED NOTES
Janey Roque PA at bedside assessing pt and discussing plan of care.       Orestes Us RN  02/14/23 7417

## 2023-03-10 ENCOUNTER — TELEPHONE (OUTPATIENT)
Dept: PULMONOLOGY | Age: 63
End: 2023-03-10

## 2023-03-10 NOTE — LETTER
Eladio De La Paz Covert [birth year]  [address]      Dear Eladio De La Paz,    I am the Pulmonologist that saw you when you were in [facility]. I recommended that you follow up with me after discharge and have a repeat Cat Scan in April. My staff has made multiple attempts to reach you but you voice mail is always full. Please call our office so that me may schedule this test and see you in follow up. As your Pulmonologist I must stress to you how important it is for you to have the test I order as well as to follow up. The test are necessare so that I can monitor your pulmonary health. I hope you are doing well and ask that you call my office to schedule these important appointments as soon as possible.      Sincerely,          Timoteo Graf MD

## 2023-03-17 NOTE — TELEPHONE ENCOUNTER
We have made multi attempts to reach this pt. But mailbox is always full will send letter. Letter in folder to be signed.

## 2024-04-15 ENCOUNTER — APPOINTMENT (OUTPATIENT)
Dept: GENERAL RADIOLOGY | Age: 64
End: 2024-04-15
Payer: MEDICARE

## 2024-04-15 ENCOUNTER — HOSPITAL ENCOUNTER (INPATIENT)
Age: 64
LOS: 4 days | Discharge: HOME | End: 2024-04-19
Attending: EMERGENCY MEDICINE | Admitting: FAMILY MEDICINE
Payer: MEDICARE

## 2024-04-15 ENCOUNTER — APPOINTMENT (OUTPATIENT)
Dept: CT IMAGING | Age: 64
End: 2024-04-15
Payer: MEDICARE

## 2024-04-15 DIAGNOSIS — J18.9 PNEUMONIA OF LEFT LOWER LOBE DUE TO INFECTIOUS ORGANISM: Primary | ICD-10-CM

## 2024-04-15 DIAGNOSIS — J18.9 MULTIFOCAL PNEUMONIA: ICD-10-CM

## 2024-04-15 DIAGNOSIS — A41.9 SEPTICEMIA (HCC): ICD-10-CM

## 2024-04-15 DIAGNOSIS — R91.8 MASS OF LOWER LOBE OF LEFT LUNG: ICD-10-CM

## 2024-04-15 LAB
ALBUMIN SERPL-MCNC: 3.2 G/DL (ref 3.4–5)
ALBUMIN/GLOB SERPL: 0.8 {RATIO} (ref 1.1–2.2)
ALP SERPL-CCNC: 113 U/L (ref 40–129)
ALT SERPL-CCNC: 32 U/L (ref 10–40)
ANION GAP SERPL CALCULATED.3IONS-SCNC: 11 MMOL/L (ref 3–16)
AST SERPL-CCNC: 22 U/L (ref 15–37)
BASOPHILS # BLD: 0.1 K/UL (ref 0–0.2)
BASOPHILS NFR BLD: 0.5 %
BILIRUB SERPL-MCNC: 0.6 MG/DL (ref 0–1)
BUN SERPL-MCNC: 20 MG/DL (ref 7–20)
CALCIUM SERPL-MCNC: 9 MG/DL (ref 8.3–10.6)
CHLORIDE SERPL-SCNC: 98 MMOL/L (ref 99–110)
CO2 SERPL-SCNC: 26 MMOL/L (ref 21–32)
CREAT SERPL-MCNC: 0.6 MG/DL (ref 0.8–1.3)
D DIMER: 1.83 UG/ML FEU (ref 0–0.6)
DEPRECATED RDW RBC AUTO: 13.8 % (ref 12.4–15.4)
EKG ATRIAL RATE: 107 BPM
EKG DIAGNOSIS: NORMAL
EKG P AXIS: 40 DEGREES
EKG P-R INTERVAL: 136 MS
EKG Q-T INTERVAL: 326 MS
EKG QRS DURATION: 78 MS
EKG QTC CALCULATION (BAZETT): 435 MS
EKG R AXIS: 25 DEGREES
EKG T AXIS: 46 DEGREES
EKG VENTRICULAR RATE: 107 BPM
EOSINOPHIL # BLD: 0 K/UL (ref 0–0.6)
EOSINOPHIL NFR BLD: 0.1 %
FLUAV RNA RESP QL NAA+PROBE: NOT DETECTED
FLUBV RNA RESP QL NAA+PROBE: NOT DETECTED
GFR SERPLBLD CREATININE-BSD FMLA CKD-EPI: >90 ML/MIN/{1.73_M2}
GLUCOSE SERPL-MCNC: 131 MG/DL (ref 70–99)
HCT VFR BLD AUTO: 36.9 % (ref 40.5–52.5)
HGB BLD-MCNC: 12.3 G/DL (ref 13.5–17.5)
LACTATE BLDV-SCNC: 1 MMOL/L (ref 0.4–2)
LIPASE SERPL-CCNC: 9 U/L (ref 13–60)
LYMPHOCYTES # BLD: 0.5 K/UL (ref 1–5.1)
LYMPHOCYTES NFR BLD: 2.2 %
MAGNESIUM SERPL-MCNC: 1.9 MG/DL (ref 1.8–2.4)
MCH RBC QN AUTO: 29.6 PG (ref 26–34)
MCHC RBC AUTO-ENTMCNC: 33.4 G/DL (ref 31–36)
MCV RBC AUTO: 88.8 FL (ref 80–100)
MONOCYTES # BLD: 1.5 K/UL (ref 0–1.3)
MONOCYTES NFR BLD: 6.6 %
NEUTROPHILS # BLD: 20.6 K/UL (ref 1.7–7.7)
NEUTROPHILS NFR BLD: 90.6 %
NT-PROBNP SERPL-MCNC: 149 PG/ML (ref 0–124)
PLATELET # BLD AUTO: 433 K/UL (ref 135–450)
PMV BLD AUTO: 7.5 FL (ref 5–10.5)
POTASSIUM SERPL-SCNC: 4.5 MMOL/L (ref 3.5–5.1)
PROT SERPL-MCNC: 7 G/DL (ref 6.4–8.2)
RBC # BLD AUTO: 4.16 M/UL (ref 4.2–5.9)
SARS-COV-2 RNA RESP QL NAA+PROBE: NOT DETECTED
SODIUM SERPL-SCNC: 135 MMOL/L (ref 136–145)
TROPONIN, HIGH SENSITIVITY: 7 NG/L (ref 0–22)
WBC # BLD AUTO: 22.7 K/UL (ref 4–11)

## 2024-04-15 PROCEDURE — 2580000003 HC RX 258: Performed by: EMERGENCY MEDICINE

## 2024-04-15 PROCEDURE — 87040 BLOOD CULTURE FOR BACTERIA: CPT

## 2024-04-15 PROCEDURE — 93005 ELECTROCARDIOGRAM TRACING: CPT | Performed by: EMERGENCY MEDICINE

## 2024-04-15 PROCEDURE — 2580000003 HC RX 258: Performed by: FAMILY MEDICINE

## 2024-04-15 PROCEDURE — 96375 TX/PRO/DX INJ NEW DRUG ADDON: CPT

## 2024-04-15 PROCEDURE — 85025 COMPLETE CBC W/AUTO DIFF WBC: CPT

## 2024-04-15 PROCEDURE — 83605 ASSAY OF LACTIC ACID: CPT

## 2024-04-15 PROCEDURE — 6360000004 HC RX CONTRAST MEDICATION: Performed by: FAMILY MEDICINE

## 2024-04-15 PROCEDURE — 6370000000 HC RX 637 (ALT 250 FOR IP): Performed by: INTERNAL MEDICINE

## 2024-04-15 PROCEDURE — 99285 EMERGENCY DEPT VISIT HI MDM: CPT

## 2024-04-15 PROCEDURE — 6370000000 HC RX 637 (ALT 250 FOR IP): Performed by: FAMILY MEDICINE

## 2024-04-15 PROCEDURE — 80053 COMPREHEN METABOLIC PANEL: CPT

## 2024-04-15 PROCEDURE — 71046 X-RAY EXAM CHEST 2 VIEWS: CPT

## 2024-04-15 PROCEDURE — 93010 ELECTROCARDIOGRAM REPORT: CPT | Performed by: INTERNAL MEDICINE

## 2024-04-15 PROCEDURE — 83690 ASSAY OF LIPASE: CPT

## 2024-04-15 PROCEDURE — 1200000000 HC SEMI PRIVATE

## 2024-04-15 PROCEDURE — 6360000002 HC RX W HCPCS: Performed by: EMERGENCY MEDICINE

## 2024-04-15 PROCEDURE — 84484 ASSAY OF TROPONIN QUANT: CPT

## 2024-04-15 PROCEDURE — 6360000002 HC RX W HCPCS: Performed by: INTERNAL MEDICINE

## 2024-04-15 PROCEDURE — 71260 CT THORAX DX C+: CPT

## 2024-04-15 PROCEDURE — 85379 FIBRIN DEGRADATION QUANT: CPT

## 2024-04-15 PROCEDURE — 83880 ASSAY OF NATRIURETIC PEPTIDE: CPT

## 2024-04-15 PROCEDURE — 87636 SARSCOV2 & INF A&B AMP PRB: CPT

## 2024-04-15 PROCEDURE — 83735 ASSAY OF MAGNESIUM: CPT

## 2024-04-15 PROCEDURE — 96374 THER/PROPH/DIAG INJ IV PUSH: CPT

## 2024-04-15 RX ORDER — MORPHINE SULFATE 4 MG/ML
4 INJECTION, SOLUTION INTRAMUSCULAR; INTRAVENOUS ONCE
Status: COMPLETED | OUTPATIENT
Start: 2024-04-15 | End: 2024-04-15

## 2024-04-15 RX ORDER — ASPIRIN 81 MG/1
81 TABLET ORAL DAILY
Status: DISCONTINUED | OUTPATIENT
Start: 2024-04-15 | End: 2024-04-19 | Stop reason: HOSPADM

## 2024-04-15 RX ORDER — 0.9 % SODIUM CHLORIDE 0.9 %
500 INTRAVENOUS SOLUTION INTRAVENOUS ONCE
Status: COMPLETED | OUTPATIENT
Start: 2024-04-15 | End: 2024-04-15

## 2024-04-15 RX ORDER — OXYCODONE HYDROCHLORIDE 5 MG/1
10 TABLET ORAL EVERY 4 HOURS PRN
Status: DISCONTINUED | OUTPATIENT
Start: 2024-04-15 | End: 2024-04-19 | Stop reason: HOSPADM

## 2024-04-15 RX ORDER — ONDANSETRON 2 MG/ML
4 INJECTION INTRAMUSCULAR; INTRAVENOUS ONCE
Status: COMPLETED | OUTPATIENT
Start: 2024-04-15 | End: 2024-04-15

## 2024-04-15 RX ORDER — SODIUM CHLORIDE 0.9 % (FLUSH) 0.9 %
5-40 SYRINGE (ML) INJECTION EVERY 12 HOURS SCHEDULED
Status: DISCONTINUED | OUTPATIENT
Start: 2024-04-15 | End: 2024-04-19 | Stop reason: HOSPADM

## 2024-04-15 RX ORDER — SODIUM CHLORIDE 9 MG/ML
INJECTION, SOLUTION INTRAVENOUS PRN
Status: DISCONTINUED | OUTPATIENT
Start: 2024-04-15 | End: 2024-04-19 | Stop reason: HOSPADM

## 2024-04-15 RX ORDER — SODIUM CHLORIDE 0.9 % (FLUSH) 0.9 %
5-40 SYRINGE (ML) INJECTION PRN
Status: DISCONTINUED | OUTPATIENT
Start: 2024-04-15 | End: 2024-04-19 | Stop reason: HOSPADM

## 2024-04-15 RX ORDER — ATORVASTATIN CALCIUM 10 MG/1
20 TABLET, FILM COATED ORAL DAILY
Status: DISCONTINUED | OUTPATIENT
Start: 2024-04-15 | End: 2024-04-19 | Stop reason: HOSPADM

## 2024-04-15 RX ORDER — OXYCODONE HYDROCHLORIDE 5 MG/1
5 TABLET ORAL EVERY 4 HOURS PRN
Status: DISCONTINUED | OUTPATIENT
Start: 2024-04-15 | End: 2024-04-19 | Stop reason: HOSPADM

## 2024-04-15 RX ORDER — ONDANSETRON 2 MG/ML
4 INJECTION INTRAMUSCULAR; INTRAVENOUS EVERY 6 HOURS PRN
Status: DISCONTINUED | OUTPATIENT
Start: 2024-04-15 | End: 2024-04-19 | Stop reason: HOSPADM

## 2024-04-15 RX ORDER — GABAPENTIN 400 MG/1
400 CAPSULE ORAL 3 TIMES DAILY
Status: DISCONTINUED | OUTPATIENT
Start: 2024-04-15 | End: 2024-04-19 | Stop reason: HOSPADM

## 2024-04-15 RX ORDER — ACETAMINOPHEN 650 MG/1
650 SUPPOSITORY RECTAL EVERY 6 HOURS PRN
Status: DISCONTINUED | OUTPATIENT
Start: 2024-04-15 | End: 2024-04-19 | Stop reason: HOSPADM

## 2024-04-15 RX ORDER — ENOXAPARIN SODIUM 100 MG/ML
40 INJECTION SUBCUTANEOUS DAILY
Status: DISCONTINUED | OUTPATIENT
Start: 2024-04-15 | End: 2024-04-19 | Stop reason: HOSPADM

## 2024-04-15 RX ORDER — GABAPENTIN 300 MG/1
300 CAPSULE ORAL 3 TIMES DAILY
Status: DISCONTINUED | OUTPATIENT
Start: 2024-04-15 | End: 2024-04-15

## 2024-04-15 RX ORDER — MORPHINE SULFATE 2 MG/ML
2 INJECTION, SOLUTION INTRAMUSCULAR; INTRAVENOUS EVERY 4 HOURS PRN
Status: DISCONTINUED | OUTPATIENT
Start: 2024-04-15 | End: 2024-04-19 | Stop reason: HOSPADM

## 2024-04-15 RX ORDER — POLYETHYLENE GLYCOL 3350 17 G/17G
17 POWDER, FOR SOLUTION ORAL DAILY PRN
Status: DISCONTINUED | OUTPATIENT
Start: 2024-04-15 | End: 2024-04-19 | Stop reason: HOSPADM

## 2024-04-15 RX ORDER — ACETAMINOPHEN 325 MG/1
650 TABLET ORAL EVERY 6 HOURS PRN
Status: DISCONTINUED | OUTPATIENT
Start: 2024-04-15 | End: 2024-04-19 | Stop reason: HOSPADM

## 2024-04-15 RX ORDER — MORPHINE SULFATE 4 MG/ML
4 INJECTION, SOLUTION INTRAMUSCULAR; INTRAVENOUS EVERY 4 HOURS PRN
Status: DISCONTINUED | OUTPATIENT
Start: 2024-04-15 | End: 2024-04-19 | Stop reason: HOSPADM

## 2024-04-15 RX ORDER — SODIUM CHLORIDE 9 MG/ML
INJECTION, SOLUTION INTRAVENOUS CONTINUOUS
Status: ACTIVE | OUTPATIENT
Start: 2024-04-15 | End: 2024-04-16

## 2024-04-15 RX ORDER — ONDANSETRON 4 MG/1
4 TABLET, ORALLY DISINTEGRATING ORAL EVERY 8 HOURS PRN
Status: DISCONTINUED | OUTPATIENT
Start: 2024-04-15 | End: 2024-04-19 | Stop reason: HOSPADM

## 2024-04-15 RX ADMIN — ONDANSETRON 4 MG: 2 INJECTION INTRAMUSCULAR; INTRAVENOUS at 04:15

## 2024-04-15 RX ADMIN — OXYCODONE 10 MG: 5 TABLET ORAL at 17:32

## 2024-04-15 RX ADMIN — GABAPENTIN 400 MG: 400 CAPSULE ORAL at 17:32

## 2024-04-15 RX ADMIN — SODIUM CHLORIDE 500 ML: 9 INJECTION, SOLUTION INTRAVENOUS at 04:15

## 2024-04-15 RX ADMIN — CEFTRIAXONE SODIUM 1000 MG: 1 INJECTION, POWDER, FOR SOLUTION INTRAMUSCULAR; INTRAVENOUS at 05:47

## 2024-04-15 RX ADMIN — ATORVASTATIN CALCIUM 20 MG: 10 TABLET, FILM COATED ORAL at 20:48

## 2024-04-15 RX ADMIN — IOPAMIDOL 75 ML: 755 INJECTION, SOLUTION INTRAVENOUS at 05:38

## 2024-04-15 RX ADMIN — OXYCODONE 10 MG: 5 TABLET ORAL at 12:26

## 2024-04-15 RX ADMIN — SODIUM CHLORIDE: 9 INJECTION, SOLUTION INTRAVENOUS at 19:00

## 2024-04-15 RX ADMIN — SODIUM CHLORIDE: 9 INJECTION, SOLUTION INTRAVENOUS at 05:47

## 2024-04-15 RX ADMIN — GABAPENTIN 400 MG: 400 CAPSULE ORAL at 20:48

## 2024-04-15 RX ADMIN — MORPHINE SULFATE 4 MG: 4 INJECTION, SOLUTION INTRAMUSCULAR; INTRAVENOUS at 04:15

## 2024-04-15 RX ADMIN — OXYCODONE 10 MG: 5 TABLET ORAL at 20:46

## 2024-04-15 RX ADMIN — ATORVASTATIN CALCIUM 20 MG: 10 TABLET, FILM COATED ORAL at 12:26

## 2024-04-15 RX ADMIN — SODIUM CHLORIDE, PRESERVATIVE FREE 10 ML: 5 INJECTION INTRAVENOUS at 20:50

## 2024-04-15 RX ADMIN — AZITHROMYCIN MONOHYDRATE 500 MG: 500 INJECTION, POWDER, LYOPHILIZED, FOR SOLUTION INTRAVENOUS at 05:52

## 2024-04-15 RX ADMIN — MORPHINE SULFATE 4 MG: 4 INJECTION, SOLUTION INTRAMUSCULAR; INTRAVENOUS at 18:43

## 2024-04-15 RX ADMIN — ASPIRIN 81 MG: 81 TABLET, COATED ORAL at 12:26

## 2024-04-15 ASSESSMENT — PAIN DESCRIPTION - PAIN TYPE
TYPE: ACUTE PAIN
TYPE: ACUTE PAIN

## 2024-04-15 ASSESSMENT — PAIN SCALES - GENERAL
PAINLEVEL_OUTOF10: 4
PAINLEVEL_OUTOF10: 10
PAINLEVEL_OUTOF10: 10
PAINLEVEL_OUTOF10: 9
PAINLEVEL_OUTOF10: 3
PAINLEVEL_OUTOF10: 0
PAINLEVEL_OUTOF10: 10

## 2024-04-15 ASSESSMENT — PAIN - FUNCTIONAL ASSESSMENT
PAIN_FUNCTIONAL_ASSESSMENT: PREVENTS OR INTERFERES WITH MANY ACTIVE NOT PASSIVE ACTIVITIES
PAIN_FUNCTIONAL_ASSESSMENT: PREVENTS OR INTERFERES SOME ACTIVE ACTIVITIES AND ADLS

## 2024-04-15 ASSESSMENT — PAIN DESCRIPTION - FREQUENCY
FREQUENCY: CONTINUOUS
FREQUENCY: CONTINUOUS

## 2024-04-15 ASSESSMENT — PAIN DESCRIPTION - ORIENTATION
ORIENTATION: LEFT

## 2024-04-15 ASSESSMENT — PAIN DESCRIPTION - LOCATION
LOCATION: RIB CAGE
LOCATION: FLANK
LOCATION: ABDOMEN

## 2024-04-15 ASSESSMENT — PAIN DESCRIPTION - ONSET
ONSET: ON-GOING
ONSET: OTHER (COMMENT)

## 2024-04-15 ASSESSMENT — PAIN DESCRIPTION - DESCRIPTORS
DESCRIPTORS: SHARP;SHOOTING
DESCRIPTORS: SHARP
DESCRIPTORS: SHARP

## 2024-04-15 NOTE — ED PROVIDER NOTES
EMERGENCY DEPARTMENT ENCOUNTER        Pt Name: Brian Whitmore  MRN: [id number]  Birthdate 1960  Date of evaluation: 4/15/2024  Provider: Travis Jiménez MD  PCP: Navid Landa MD      CHIEF COMPLAINT       Chief Complaint   Patient presents with    Shortness of Breath     Pt states he has been feeling SOB X 2 days.  Concern for pneumonia        HISTORY OFPRESENT ILLNESS   (Location/Symptom, Timing/Onset, Context/Setting, Quality, Duration, Modifying Factors,Severity)  Note limiting factors.     Brian Whitmore is a 63 y.o. male presenting today due to concern for feeling short of breath over the last 2 days similar to whenever he had pneumonia.  He reports significant chest pain with breathing on the left side.  He denies any hemoptysis or history of blood clots.  He is a smoker.  He reports having a fever as high as 102 °F.  He is coughing up green mucus.  He states last time he felt this way he became septic and needed to be admitted.  He denies any vomiting although does have left upper abdominal discomfort.  He does complain about having a headache but denies any falls or trauma.  Due to worsening shortness of breath for the last 4 days, he decided to come to the emergency department for further evaluation.        REVIEW OF SYSTEMS    (2-9 systems for level 4, 10 or more for level 5)     Review of Systems   Constitutional:  Positive for activity change, appetite change, chills, fatigue and fever.   HENT:  Positive for congestion. Negative for sore throat.    Respiratory:  Positive for cough, chest tightness and shortness of breath.    Cardiovascular:  Positive for chest pain.   Gastrointestinal:  Positive for abdominal pain and nausea. Negative for vomiting.   Musculoskeletal:  Negative for back pain and neck pain.   Skin:  Negative for wound.   Neurological:  Positive for weakness (Generalized) and headaches (Moderate). Negative for syncope.   Psychiatric/Behavioral:  Negative for confusion. The  5-40 mL (has no administration in time range)   0.9 % sodium chloride infusion (has no administration in time range)   enoxaparin (LOVENOX) injection 40 mg (40 mg SubCUTAneous Given 4/17/24 0850)   ondansetron (ZOFRAN-ODT) disintegrating tablet 4 mg ( Oral See Alternative 4/17/24 0555)     Or   ondansetron (ZOFRAN) injection 4 mg (4 mg IntraVENous Given 4/17/24 0555)   polyethylene glycol (GLYCOLAX) packet 17 g (has no administration in time range)   acetaminophen (TYLENOL) tablet 650 mg (has no administration in time range)     Or   acetaminophen (TYLENOL) suppository 650 mg (has no administration in time range)   cefTRIAXone (ROCEPHIN) 1,000 mg in sodium chloride 0.9 % 50 mL IVPB (mini-bag) (0 mg IntraVENous Stopped 4/17/24 0536)     And   azithromycin (ZITHROMAX) 500 mg in 250 mL addavial (0 mg IntraVENous Stopped 4/17/24 0647)   oxyCODONE (ROXICODONE) immediate release tablet 5 mg ( Oral See Alternative 4/18/24 0021)     Or   oxyCODONE (ROXICODONE) immediate release tablet 10 mg (10 mg Oral Given 4/18/24 0021)   morphine (PF) injection 2 mg (2 mg IntraVENous Given 4/17/24 1639)     Or   morphine sulfate (PF) injection 4 mg ( IntraVENous See Alternative 4/17/24 1639)   gabapentin (NEURONTIN) capsule 400 mg (400 mg Oral Given 4/17/24 1922)   guaiFENesin (MUCINEX) extended release tablet 600 mg (600 mg Oral Given 4/17/24 1922)   ipratropium 0.5 mg-albuterol 2.5 mg (DUONEB) nebulizer solution 1 Dose (1 Dose Inhalation Given 4/17/24 2018)   morphine sulfate (PF) injection 4 mg (4 mg IntraVENous Given 4/15/24 0415)   ondansetron (ZOFRAN) injection 4 mg (4 mg IntraVENous Given 4/15/24 0415)   sodium chloride 0.9 % bolus 500 mL (0 mLs IntraVENous Stopped 4/15/24 0543)   cefTRIAXone (ROCEPHIN) 1,000 mg in sodium chloride 0.9 % 50 mL IVPB (mini-bag) (0 mg IntraVENous Stopped 4/15/24 8420)   azithromycin (ZITHROMAX) 500 mg in 250 mL addavial (0 mg IntraVENous Stopped 4/15/24 5449)   iopamidol (ISOVUE-370) 76 % injection 75

## 2024-04-15 NOTE — ED NOTES
Mr. Whitmore is a 63 y.o. male who had concerns including Shortness of Breath (Pt states he has been feeling SOB X 2 days.  Concern for pneumonia ).    Chief Complaint   Patient presents with    Shortness of Breath     Pt states he has been feeling SOB X 2 days.  Concern for pneumonia        He is being admitted for:    Multifocal pneumonia    His ED problem list included:    1. Pneumonia of left lower lobe due to infectious organism    2. Septicemia (HCC)        Past Medical History:   Diagnosis Date    Anxiety     Clostridium difficile infection 03/23/2020    Clostridium difficile infection 03/23/2020    GERD (gastroesophageal reflux disease)     Hypertension        Past Surgical History:   Procedure Laterality Date    ABDOMEN SURGERY      ANKLE SURGERY Right     HAND SURGERY Left     KNEE SURGERY Right     ROTATOR CUFF REPAIR Right        His recent abnormal labs were:    Labs Reviewed   CBC WITH AUTO DIFFERENTIAL - Abnormal; Notable for the following components:       Result Value    WBC 22.7 (*)     RBC 4.16 (*)     Hemoglobin 12.3 (*)     Hematocrit 36.9 (*)     Neutrophils Absolute 20.6 (*)     Lymphocytes Absolute 0.5 (*)     Monocytes Absolute 1.5 (*)     All other components within normal limits   COMPREHENSIVE METABOLIC PANEL - Abnormal; Notable for the following components:    Sodium 135 (*)     Chloride 98 (*)     Glucose 131 (*)     Creatinine 0.6 (*)     Albumin 3.2 (*)     Albumin/Globulin Ratio 0.8 (*)     All other components within normal limits   D-DIMER, QUANTITATIVE - Abnormal; Notable for the following components:    D-Dimer, Quant 1.83 (*)     All other components within normal limits   BRAIN NATRIURETIC PEPTIDE - Abnormal; Notable for the following components:    Pro- (*)     All other components within normal limits   LIPASE - Abnormal; Notable for the following components:    Lipase 9.0 (*)     All other components within normal limits   COVID-19 & INFLUENZA COMBO   CULTURE, BLOOD 1

## 2024-04-15 NOTE — H&P
Hospital Medicine History & Physical      Date of Admission: 4/15/2024    Date of Service:  Pt seen/examined on 15 April 2024     [x]Admitted to Inpatient with expected LOS greater than two midnights due to medical therapy.  []Placed in Observation status.    Chief Admission Complaint:  SOB      Presenting Admission History:      63 y.o. male who presented to [facility] with a 2 day hx of severe SOB/LONG w/ associated L sided Pleuritic Chest Pain, unable to take a deep breath 2nd to same.       CTPA negative for acute PE but demonstrated LLL infiltrate.     The patient denies any fever/chills or other signs/sxs of systemic illness or identifiable aggravating/alleviating factors\"      Assessment/Plan:      Current Principal Problem:  Multifocal pneumonia    PNA - likely gram positive CAP w/ Strep Pneumonia.  Started on empiric Rocephin/Azithro in ED on 15 April.     Sepsis - w/ Leukocytosis/Tachycardia POArrival 2nd to above infection.  Continue IVF as appropriate and monitor clinical response w/ ABX as written.     HTN - w/out known CAD and no evidence of active signs/sxs of ischemia/failure. Currently controlled on home meds w/ vitals documented and reviewed.    HyperLipidemia - normally controlled on home Statin. Continued.  F/U w/ PCP outpt for medication initiation/adjustment as needed.     GERD - w/out active signs/sxs of dysphagia/odynophagia. No evidence of active PUD or hx of GI bleed. Controlled on home PPI - continue.      Management and the need for Hospitalization of the patient NOT discussed w/ the Emergency Department Provider by me      CXR: I have reviewed the CXR with the following interpretation: L basilar ASD  EKG:  I have reviewed the EKG with the following interpretation: tachy w/out acute ischemic changes    Physical Exam Performed:      /78   Pulse 86   Temp 98.9 °F (37.2 °C) (Oral)   Resp 23   SpO2 90%     General appearance:  No apparent distress, appears stated age and  [x]No    --------------------------------------------------------------------------------------------------------------------------------------------------------------------    Imaging:     CT CHEST PULMONARY EMBOLISM W CONTRAST    Result Date: 4/15/2024  EXAMINATION: CTA OF THE CHEST 4/15/2024 4:37 am TECHNIQUE: CTA of the chest was performed after the administration of intravenous contrast.  Multiplanar reformatted images are provided for review.  MIP images are provided for review. Automated exposure control, iterative reconstruction, and/or weight based adjustment of the mA/kV was utilized to reduce the radiation dose to as low as reasonably achievable. COMPARISON: None. HISTORY: ORDERING SYSTEM PROVIDED HISTORY: Rule out Pulm Embolism TECHNOLOGIST PROVIDED HISTORY: Reason for exam:->Rule out Pulm Embolism Additional Contrast?->1 Reason for Exam: Dyspnea; cough; elevated d-dimer; PE protocol FINDINGS: Pulmonary Arteries: The central pulmonary arteries are adequately opacified for evaluation.  No evidence of intraluminal filling defect to suggest central pulmonary embolism.  Main pulmonary artery is normal in caliber. Evaluation of the peripheral pulmonary arteries is difficult due to motion artifacts. Mediastinum: No evidence of mediastinal lymphadenopathy.  The heart and pericardium demonstrate no acute abnormality.  There is no acute abnormality of the thoracic aorta. Lungs/pleura: There is ill-defined airspace opacity in the left lower lobe suggesting pneumonia.  There is a small left pleural effusion.  A lobulated nodule is seen in the superior segment of the left lung lower lobe measures 14 x 16 mm highly suspicious for neoplastic process for which further evaluation is recommended. Axial images 144 series 3. Upper Abdomen: Limited images of the upper abdomen are unremarkable. Soft Tissues/Bones: No acute bone or soft tissue abnormality.     1. No evidence of central pulmonary embolism. 2. Left lower lobe

## 2024-04-15 NOTE — CARE COORDINATION
Case Management Assessment  Initial Evaluation    Date/Time of Evaluation: 4/15/2024 4:32 PM  Assessment Completed by: Carla Torres RN    If patient is discharged prior to next notation, then this note serves as note for discharge by case management.    Patient Name: Brian Whitmore                   YOB: 1960  Diagnosis: Septicemia (HCC) [A41.9]  Pneumonia of left lower lobe due to infectious organism [J18.9]  Multifocal pneumonia [J18.9]                   Date / Time: 4/15/2024  3:26 AM    Patient Admission Status: Inpatient   Readmission Risk (Low < 19, Mod (19-27), High > 27): Readmission Risk Score: 9.5    Current PCP: Navid Landa MD  PCP verified by CM? Yes    Chart Reviewed: Yes      History Provided by: Patient  Patient Orientation: Alert and Oriented, Person, Place, Situation, Self    Patient Cognition: Alert    Hospitalization in the last 30 days (Readmission):  No    If yes, Readmission Assessment in CM Navigator will be completed.    Advance Directives:      Code Status: Full Code   Patient's Primary Decision Maker is: Legal Next of Kin    Primary Decision Maker: SARAH STEINER - Brother/Sister - [phone]    Secondary Decision Maker: David Whitmore - Brother/Sister - [phone]    Discharge Planning:    Patient lives with: Children Type of Home: House  Primary Care Giver: Self  Patient Support Systems include: Children   Current Financial resources: Medicare  Current community resources: None  Current services prior to admission: None            Current DME:              Type of Home Care services:  None    ADLS  Prior functional level: Independent in ADLs/IADLs (with point cane)  Current functional level: Independent in ADLs/IADLs    PT AM-PAC:   /24  OT AM-PAC:   /24    Family can provide assistance at DC:    Would you like Case Management to discuss the discharge plan with any other family members/significant others, and if so, who? No  Plans to Return to Present Housing:

## 2024-04-16 LAB
ANION GAP SERPL CALCULATED.3IONS-SCNC: 11 MMOL/L (ref 3–16)
BASOPHILS # BLD: 0 K/UL (ref 0–0.2)
BASOPHILS NFR BLD: 0.2 %
BUN SERPL-MCNC: 18 MG/DL (ref 7–20)
CALCIUM SERPL-MCNC: 8.8 MG/DL (ref 8.3–10.6)
CHLORIDE SERPL-SCNC: 98 MMOL/L (ref 99–110)
CO2 SERPL-SCNC: 28 MMOL/L (ref 21–32)
CREAT SERPL-MCNC: 0.6 MG/DL (ref 0.8–1.3)
DEPRECATED RDW RBC AUTO: 14 % (ref 12.4–15.4)
EOSINOPHIL # BLD: 0 K/UL (ref 0–0.6)
EOSINOPHIL NFR BLD: 0.2 %
GFR SERPLBLD CREATININE-BSD FMLA CKD-EPI: >90 ML/MIN/{1.73_M2}
GLUCOSE SERPL-MCNC: 96 MG/DL (ref 70–99)
HCT VFR BLD AUTO: 33.5 % (ref 40.5–52.5)
HGB BLD-MCNC: 10.9 G/DL (ref 13.5–17.5)
LYMPHOCYTES # BLD: 0.9 K/UL (ref 1–5.1)
LYMPHOCYTES NFR BLD: 4.8 %
MCH RBC QN AUTO: 29.3 PG (ref 26–34)
MCHC RBC AUTO-ENTMCNC: 32.5 G/DL (ref 31–36)
MCV RBC AUTO: 89.9 FL (ref 80–100)
MONOCYTES # BLD: 1.7 K/UL (ref 0–1.3)
MONOCYTES NFR BLD: 8.9 %
NEUTROPHILS # BLD: 16.1 K/UL (ref 1.7–7.7)
NEUTROPHILS NFR BLD: 85.9 %
PLATELET # BLD AUTO: 406 K/UL (ref 135–450)
PMV BLD AUTO: 7.1 FL (ref 5–10.5)
POTASSIUM SERPL-SCNC: 4.5 MMOL/L (ref 3.5–5.1)
RBC # BLD AUTO: 3.72 M/UL (ref 4.2–5.9)
SODIUM SERPL-SCNC: 137 MMOL/L (ref 136–145)
WBC # BLD AUTO: 18.8 K/UL (ref 4–11)

## 2024-04-16 PROCEDURE — 2580000003 HC RX 258: Performed by: FAMILY MEDICINE

## 2024-04-16 PROCEDURE — 80048 BASIC METABOLIC PNL TOTAL CA: CPT

## 2024-04-16 PROCEDURE — 85025 COMPLETE CBC W/AUTO DIFF WBC: CPT

## 2024-04-16 PROCEDURE — 1200000000 HC SEMI PRIVATE

## 2024-04-16 PROCEDURE — 36415 COLL VENOUS BLD VENIPUNCTURE: CPT

## 2024-04-16 PROCEDURE — 6370000000 HC RX 637 (ALT 250 FOR IP): Performed by: INTERNAL MEDICINE

## 2024-04-16 PROCEDURE — 6360000002 HC RX W HCPCS: Performed by: FAMILY MEDICINE

## 2024-04-16 PROCEDURE — 94669 MECHANICAL CHEST WALL OSCILL: CPT

## 2024-04-16 PROCEDURE — 6370000000 HC RX 637 (ALT 250 FOR IP): Performed by: FAMILY MEDICINE

## 2024-04-16 RX ORDER — GUAIFENESIN 600 MG/1
600 TABLET, EXTENDED RELEASE ORAL 2 TIMES DAILY
Status: DISCONTINUED | OUTPATIENT
Start: 2024-04-16 | End: 2024-04-19 | Stop reason: HOSPADM

## 2024-04-16 RX ADMIN — SODIUM CHLORIDE, PRESERVATIVE FREE 10 ML: 5 INJECTION INTRAVENOUS at 08:36

## 2024-04-16 RX ADMIN — GUAIFENESIN 600 MG: 600 TABLET ORAL at 13:30

## 2024-04-16 RX ADMIN — GABAPENTIN 400 MG: 400 CAPSULE ORAL at 19:44

## 2024-04-16 RX ADMIN — OXYCODONE 10 MG: 5 TABLET ORAL at 08:13

## 2024-04-16 RX ADMIN — ENOXAPARIN SODIUM 40 MG: 100 INJECTION SUBCUTANEOUS at 08:13

## 2024-04-16 RX ADMIN — CEFTRIAXONE SODIUM 1000 MG: 1 INJECTION, POWDER, FOR SOLUTION INTRAMUSCULAR; INTRAVENOUS at 05:30

## 2024-04-16 RX ADMIN — OXYCODONE 10 MG: 5 TABLET ORAL at 23:14

## 2024-04-16 RX ADMIN — GABAPENTIN 400 MG: 400 CAPSULE ORAL at 13:30

## 2024-04-16 RX ADMIN — GABAPENTIN 400 MG: 400 CAPSULE ORAL at 08:13

## 2024-04-16 RX ADMIN — GUAIFENESIN 600 MG: 600 TABLET ORAL at 19:44

## 2024-04-16 RX ADMIN — ATORVASTATIN CALCIUM 20 MG: 10 TABLET, FILM COATED ORAL at 19:44

## 2024-04-16 RX ADMIN — AZITHROMYCIN MONOHYDRATE 500 MG: 500 INJECTION, POWDER, LYOPHILIZED, FOR SOLUTION INTRAVENOUS at 06:11

## 2024-04-16 RX ADMIN — OXYCODONE 10 MG: 5 TABLET ORAL at 01:42

## 2024-04-16 RX ADMIN — OXYCODONE 10 MG: 5 TABLET ORAL at 18:40

## 2024-04-16 RX ADMIN — OXYCODONE 10 MG: 5 TABLET ORAL at 13:30

## 2024-04-16 RX ADMIN — ASPIRIN 81 MG: 81 TABLET, COATED ORAL at 08:13

## 2024-04-16 ASSESSMENT — PAIN SCALES - GENERAL
PAINLEVEL_OUTOF10: 8
PAINLEVEL_OUTOF10: 4
PAINLEVEL_OUTOF10: 10
PAINLEVEL_OUTOF10: 3
PAINLEVEL_OUTOF10: 10
PAINLEVEL_OUTOF10: 3
PAINLEVEL_OUTOF10: 3
PAINLEVEL_OUTOF10: 9

## 2024-04-16 ASSESSMENT — PAIN - FUNCTIONAL ASSESSMENT: PAIN_FUNCTIONAL_ASSESSMENT: ACTIVITIES ARE NOT PREVENTED

## 2024-04-16 ASSESSMENT — PAIN DESCRIPTION - LOCATION
LOCATION: RIB CAGE
LOCATION: CHEST
LOCATION: RIB CAGE

## 2024-04-16 ASSESSMENT — PAIN DESCRIPTION - ORIENTATION
ORIENTATION: LEFT;MID
ORIENTATION: LEFT;LOWER
ORIENTATION: LEFT

## 2024-04-16 ASSESSMENT — PAIN DESCRIPTION - DESCRIPTORS
DESCRIPTORS: ACHING
DESCRIPTORS: SHARP

## 2024-04-16 NOTE — CARE COORDINATION
Chart reviewed. Care managed by IM. Continuing IVATBX. T max 100. WBC's remain elevated at 18.8. Ambulatory in room. IPTA following for needs. Carla Torres RN

## 2024-04-16 NOTE — PLAN OF CARE
signs and symptoms of electrolyte imbalances   Administer electrolyte replacement as ordered   Monitor response to electrolyte replacements, including repeat lab results as appropriate   Fluid restriction as ordered   Instruct patient on fluid and nutrition restrictions as appropriate  4/15/2024 2202 by Octavia Machuca RN  Outcome: Progressing  Goal: Hemodynamic stability and optimal renal function maintained  4/16/2024 0844 by Jaz Werner RN  Outcome: Progressing  Flowsheets (Taken 4/16/2024 0844)  Hemodynamic stability and optimal renal function maintained:   Monitor labs and assess for signs and symptoms of volume excess or deficit   Monitor intake, output and patient weight   Monitor urine specific gravity, serum osmolarity and serum sodium as indicated or ordered   Monitor response to interventions for patient's volume status, including labs, urine output, blood pressure (other measures as available)   Encourage oral intake as appropriate   Instruct patient on fluid and nutrition restrictions as appropriate  4/15/2024 2202 by Octavia Machuca RN  Outcome: Progressing  Goal: Glucose maintained within prescribed range  4/16/2024 0844 by Jaz Werner RN  Outcome: Progressing  Flowsheets (Taken 4/16/2024 0844)  Glucose maintained within prescribed range:   Monitor blood glucose as ordered   Assess for signs and symptoms of hyperglycemia and hypoglycemia   Administer ordered medications to maintain glucose within target range   Assess barriers to adequate nutritional intake and initiate nutrition consult as needed   Instruct patient on self management of diabetes and initiate consult as needed  4/15/2024 2202 by Octavia Machuca RN  Outcome: Progressing     Problem: Hematologic - Adult  Goal: Maintains hematologic stability  4/16/2024 0844 by Jaz Werner RN  Outcome: Progressing  Flowsheets (Taken 4/16/2024 0844)  Maintains hematologic stability:   Administer blood products/factors as ordered    Monitor labs for bleeding or clotting disorders   Assess for signs and symptoms of bleeding or hemorrhage  4/15/2024 2202 by Octavia Machuca, RN  Outcome: Progressing

## 2024-04-16 NOTE — PLAN OF CARE
Problem: Pain  Goal: Verbalizes/displays adequate comfort level or baseline comfort level  Outcome: Progressing     Problem: Safety - Adult  Goal: Free from fall injury  Outcome: Progressing     Problem: Respiratory - Adult  Goal: Achieves optimal ventilation and oxygenation  Outcome: Progressing     Problem: Infection - Adult  Goal: Absence of infection at discharge  Outcome: Progressing  Goal: Absence of infection during hospitalization  Outcome: Progressing  Goal: Absence of fever/infection during anticipated neutropenic period  Outcome: Progressing     Problem: Metabolic/Fluid and Electrolytes - Adult  Goal: Electrolytes maintained within normal limits  Outcome: Progressing  Goal: Hemodynamic stability and optimal renal function maintained  Outcome: Progressing  Goal: Glucose maintained within prescribed range  Outcome: Progressing     Problem: Hematologic - Adult  Goal: Maintains hematologic stability  Outcome: Progressing

## 2024-04-17 LAB
ANION GAP SERPL CALCULATED.3IONS-SCNC: 10 MMOL/L (ref 3–16)
BASOPHILS # BLD: 0 K/UL (ref 0–0.2)
BASOPHILS NFR BLD: 0 %
BUN SERPL-MCNC: 15 MG/DL (ref 7–20)
CALCIUM SERPL-MCNC: 8.5 MG/DL (ref 8.3–10.6)
CHLORIDE SERPL-SCNC: 96 MMOL/L (ref 99–110)
CO2 SERPL-SCNC: 27 MMOL/L (ref 21–32)
CREAT SERPL-MCNC: <0.5 MG/DL (ref 0.8–1.3)
DEPRECATED RDW RBC AUTO: 13.4 % (ref 12.4–15.4)
EOSINOPHIL # BLD: 0.2 K/UL (ref 0–0.6)
EOSINOPHIL NFR BLD: 1 %
GFR SERPLBLD CREATININE-BSD FMLA CKD-EPI: >90 ML/MIN/{1.73_M2}
GLUCOSE SERPL-MCNC: 108 MG/DL (ref 70–99)
HCT VFR BLD AUTO: 33.3 % (ref 40.5–52.5)
HGB BLD-MCNC: 11.1 G/DL (ref 13.5–17.5)
LYMPHOCYTES # BLD: 1.1 K/UL (ref 1–5.1)
LYMPHOCYTES NFR BLD: 7 %
MCH RBC QN AUTO: 29.7 PG (ref 26–34)
MCHC RBC AUTO-ENTMCNC: 33.2 G/DL (ref 31–36)
MCV RBC AUTO: 89.4 FL (ref 80–100)
MONOCYTES # BLD: 0.8 K/UL (ref 0–1.3)
MONOCYTES NFR BLD: 5 %
NEUTROPHILS # BLD: 13.9 K/UL (ref 1.7–7.7)
NEUTROPHILS NFR BLD: 84 %
NEUTS BAND NFR BLD MANUAL: 3 % (ref 0–7)
PLATELET # BLD AUTO: 439 K/UL (ref 135–450)
PLATELET BLD QL SMEAR: ADEQUATE
PMV BLD AUTO: 7.5 FL (ref 5–10.5)
POTASSIUM SERPL-SCNC: 4 MMOL/L (ref 3.5–5.1)
RBC # BLD AUTO: 3.73 M/UL (ref 4.2–5.9)
SLIDE REVIEW: ABNORMAL
SODIUM SERPL-SCNC: 133 MMOL/L (ref 136–145)
WBC # BLD AUTO: 16 K/UL (ref 4–11)

## 2024-04-17 PROCEDURE — 6360000002 HC RX W HCPCS: Performed by: INTERNAL MEDICINE

## 2024-04-17 PROCEDURE — 80048 BASIC METABOLIC PNL TOTAL CA: CPT

## 2024-04-17 PROCEDURE — 36415 COLL VENOUS BLD VENIPUNCTURE: CPT

## 2024-04-17 PROCEDURE — 6360000002 HC RX W HCPCS: Performed by: FAMILY MEDICINE

## 2024-04-17 PROCEDURE — 94640 AIRWAY INHALATION TREATMENT: CPT

## 2024-04-17 PROCEDURE — 85025 COMPLETE CBC W/AUTO DIFF WBC: CPT

## 2024-04-17 PROCEDURE — 6370000000 HC RX 637 (ALT 250 FOR IP): Performed by: INTERNAL MEDICINE

## 2024-04-17 PROCEDURE — 94669 MECHANICAL CHEST WALL OSCILL: CPT

## 2024-04-17 PROCEDURE — 1200000000 HC SEMI PRIVATE

## 2024-04-17 PROCEDURE — 2580000003 HC RX 258: Performed by: FAMILY MEDICINE

## 2024-04-17 PROCEDURE — 6370000000 HC RX 637 (ALT 250 FOR IP): Performed by: FAMILY MEDICINE

## 2024-04-17 RX ORDER — IPRATROPIUM BROMIDE AND ALBUTEROL SULFATE 2.5; .5 MG/3ML; MG/3ML
1 SOLUTION RESPIRATORY (INHALATION)
Status: DISCONTINUED | OUTPATIENT
Start: 2024-04-17 | End: 2024-04-19 | Stop reason: HOSPADM

## 2024-04-17 RX ADMIN — OXYCODONE 10 MG: 5 TABLET ORAL at 13:35

## 2024-04-17 RX ADMIN — CEFTRIAXONE SODIUM 1000 MG: 1 INJECTION, POWDER, FOR SOLUTION INTRAMUSCULAR; INTRAVENOUS at 05:06

## 2024-04-17 RX ADMIN — AZITHROMYCIN MONOHYDRATE 500 MG: 500 INJECTION, POWDER, LYOPHILIZED, FOR SOLUTION INTRAVENOUS at 05:47

## 2024-04-17 RX ADMIN — ONDANSETRON 4 MG: 2 INJECTION INTRAMUSCULAR; INTRAVENOUS at 05:55

## 2024-04-17 RX ADMIN — IPRATROPIUM BROMIDE AND ALBUTEROL SULFATE 1 DOSE: 2.5; .5 SOLUTION RESPIRATORY (INHALATION) at 15:30

## 2024-04-17 RX ADMIN — GABAPENTIN 400 MG: 400 CAPSULE ORAL at 19:22

## 2024-04-17 RX ADMIN — GUAIFENESIN 600 MG: 600 TABLET ORAL at 08:50

## 2024-04-17 RX ADMIN — SODIUM CHLORIDE, PRESERVATIVE FREE 10 ML: 5 INJECTION INTRAVENOUS at 19:23

## 2024-04-17 RX ADMIN — GUAIFENESIN 600 MG: 600 TABLET ORAL at 19:22

## 2024-04-17 RX ADMIN — OXYCODONE 10 MG: 5 TABLET ORAL at 03:47

## 2024-04-17 RX ADMIN — GABAPENTIN 400 MG: 400 CAPSULE ORAL at 16:36

## 2024-04-17 RX ADMIN — GABAPENTIN 400 MG: 400 CAPSULE ORAL at 08:50

## 2024-04-17 RX ADMIN — SODIUM CHLORIDE, PRESERVATIVE FREE 10 ML: 5 INJECTION INTRAVENOUS at 13:05

## 2024-04-17 RX ADMIN — MORPHINE SULFATE 2 MG: 2 INJECTION, SOLUTION INTRAMUSCULAR; INTRAVENOUS at 16:39

## 2024-04-17 RX ADMIN — IPRATROPIUM BROMIDE AND ALBUTEROL SULFATE 1 DOSE: 2.5; .5 SOLUTION RESPIRATORY (INHALATION) at 20:18

## 2024-04-17 RX ADMIN — ENOXAPARIN SODIUM 40 MG: 100 INJECTION SUBCUTANEOUS at 08:50

## 2024-04-17 RX ADMIN — ATORVASTATIN CALCIUM 20 MG: 10 TABLET, FILM COATED ORAL at 19:22

## 2024-04-17 RX ADMIN — OXYCODONE 10 MG: 5 TABLET ORAL at 09:01

## 2024-04-17 RX ADMIN — ASPIRIN 81 MG: 81 TABLET, COATED ORAL at 08:50

## 2024-04-17 ASSESSMENT — PAIN DESCRIPTION - LOCATION
LOCATION: BACK
LOCATION: CHEST

## 2024-04-17 ASSESSMENT — PAIN SCALES - GENERAL
PAINLEVEL_OUTOF10: 8

## 2024-04-17 ASSESSMENT — PAIN DESCRIPTION - DESCRIPTORS
DESCRIPTORS: ACHING
DESCRIPTORS: SHARP
DESCRIPTORS: ACHING;SHARP
DESCRIPTORS: ACHING

## 2024-04-17 ASSESSMENT — PAIN - FUNCTIONAL ASSESSMENT
PAIN_FUNCTIONAL_ASSESSMENT: ACTIVITIES ARE NOT PREVENTED
PAIN_FUNCTIONAL_ASSESSMENT: ACTIVITIES ARE NOT PREVENTED

## 2024-04-17 ASSESSMENT — PAIN DESCRIPTION - ORIENTATION
ORIENTATION: LEFT;LOWER;RIGHT
ORIENTATION: RIGHT;LEFT

## 2024-04-17 NOTE — RT PROTOCOL NOTE
RT Inhaler-Nebulizer Bronchodilator Protocol Note    There is a bronchodilator order in the chart from a provider indicating to follow the RT Bronchodilator Protocol and there is an “Initiate RT Inhaler-Nebulizer Bronchodilator Protocol” order as well (see protocol at bottom of note).    CXR Findings:  No results found.    The findings from the last RT Protocol Assessment were as follows:   History Pulmonary Disease: Smoker 15 pack years or more  Respiratory Pattern: Regular pattern and RR 12-20 bpm  Breath Sounds: Inspiratory and expiratory or bilateral wheezing and/or rhonchi  Cough: Strong, productive  Indication for Bronchodilator Therapy: Decreased or absent breath sounds  Bronchodilator Assessment Score: 8    Aerosolized bronchodilator medication orders have been revised according to the RT Inhaler-Nebulizer Bronchodilator Protocol below.    Respiratory Therapist to perform RT Therapy Protocol Assessment initially then follow the protocol.  Repeat RT Therapy Protocol Assessment PRN for score 0-3 or on second treatment, BID, and PRN for scores above 3.    No Indications - adjust the frequency to every 6 hours PRN wheezing or bronchospasm, if no treatments needed after 48 hours then discontinue using Per Protocol order mode.     If indication present, adjust the RT bronchodilator orders based on the Bronchodilator Assessment Score as indicated below.  Use Inhaler orders unless patient has one or more of the following: on home nebulizer, not able to hold breath for 10 seconds, is not alert and oriented, cannot activate and use MDI correctly, or respiratory rate 25 breaths per minute or more, then use the equivalent nebulizer order(s) with same Frequency and PRN reasons based on the score.  If a patient is on this medication at home then do not decrease Frequency below that used at home.    0-3 - enter or revise RT bronchodilator order(s) to equivalent RT Bronchodilator order with Frequency of every 4 hours PRN

## 2024-04-17 NOTE — CARE COORDINATION
Chart reviewed, care managed by IM. Currently with sat of 91% on RA. Ongoing SOB and productive cough. IPTA from home with son. Following for any DCP needs. Carla Torres RN

## 2024-04-17 NOTE — PLAN OF CARE
Problem: Pain  Goal: Verbalizes/displays adequate comfort level or baseline comfort level  Outcome: Progressing     Problem: Safety - Adult  Goal: Free from fall injury  Outcome: Progressing     Problem: Respiratory - Adult  Goal: Achieves optimal ventilation and oxygenation  Outcome: Progressing     Problem: Infection - Adult  Goal: Absence of infection at discharge  Outcome: Progressing  Goal: Absence of infection during hospitalization  Outcome: Progressing  Goal: Absence of fever/infection during anticipated neutropenic period  Outcome: Progressing

## 2024-04-18 PROBLEM — R91.8 MASS OF LOWER LOBE OF LEFT LUNG: Status: ACTIVE | Noted: 2024-04-18

## 2024-04-18 LAB
ANION GAP SERPL CALCULATED.3IONS-SCNC: 11 MMOL/L (ref 3–16)
BASOPHILS # BLD: 0 K/UL (ref 0–0.2)
BASOPHILS NFR BLD: 0 %
BUN SERPL-MCNC: 14 MG/DL (ref 7–20)
CALCIUM SERPL-MCNC: 8.4 MG/DL (ref 8.3–10.6)
CHLORIDE SERPL-SCNC: 95 MMOL/L (ref 99–110)
CO2 SERPL-SCNC: 27 MMOL/L (ref 21–32)
CREAT SERPL-MCNC: <0.5 MG/DL (ref 0.8–1.3)
DEPRECATED RDW RBC AUTO: 13.6 % (ref 12.4–15.4)
EOSINOPHIL # BLD: 0 K/UL (ref 0–0.6)
EOSINOPHIL NFR BLD: 0 %
GFR SERPLBLD CREATININE-BSD FMLA CKD-EPI: >90 ML/MIN/{1.73_M2}
GLUCOSE SERPL-MCNC: 117 MG/DL (ref 70–99)
HCT VFR BLD AUTO: 33.2 % (ref 40.5–52.5)
HGB BLD-MCNC: 11.2 G/DL (ref 13.5–17.5)
LYMPHOCYTES # BLD: 1.9 K/UL (ref 1–5.1)
LYMPHOCYTES NFR BLD: 13 %
MCH RBC QN AUTO: 30 PG (ref 26–34)
MCHC RBC AUTO-ENTMCNC: 33.7 G/DL (ref 31–36)
MCV RBC AUTO: 89 FL (ref 80–100)
MONOCYTES # BLD: 2.3 K/UL (ref 0–1.3)
MONOCYTES NFR BLD: 16 %
NEUTROPHILS # BLD: 10.3 K/UL (ref 1.7–7.7)
NEUTROPHILS NFR BLD: 68 %
NEUTS BAND NFR BLD MANUAL: 3 % (ref 0–7)
PATH INTERP BLD-IMP: NORMAL
PATH INTERP BLD-IMP: YES
PLATELET # BLD AUTO: 480 K/UL (ref 135–450)
PLATELET BLD QL SMEAR: ABNORMAL
PMV BLD AUTO: 7.7 FL (ref 5–10.5)
POTASSIUM SERPL-SCNC: 4.2 MMOL/L (ref 3.5–5.1)
RBC # BLD AUTO: 3.73 M/UL (ref 4.2–5.9)
RBC MORPH BLD: NORMAL
SLIDE REVIEW: ABNORMAL
SODIUM SERPL-SCNC: 133 MMOL/L (ref 136–145)
WBC # BLD AUTO: 14.5 K/UL (ref 4–11)

## 2024-04-18 PROCEDURE — 6370000000 HC RX 637 (ALT 250 FOR IP): Performed by: INTERNAL MEDICINE

## 2024-04-18 PROCEDURE — 1200000000 HC SEMI PRIVATE

## 2024-04-18 PROCEDURE — 2580000003 HC RX 258: Performed by: FAMILY MEDICINE

## 2024-04-18 PROCEDURE — 80048 BASIC METABOLIC PNL TOTAL CA: CPT

## 2024-04-18 PROCEDURE — 6370000000 HC RX 637 (ALT 250 FOR IP): Performed by: FAMILY MEDICINE

## 2024-04-18 PROCEDURE — 94669 MECHANICAL CHEST WALL OSCILL: CPT

## 2024-04-18 PROCEDURE — 6360000002 HC RX W HCPCS: Performed by: INTERNAL MEDICINE

## 2024-04-18 PROCEDURE — 94640 AIRWAY INHALATION TREATMENT: CPT

## 2024-04-18 PROCEDURE — 6360000002 HC RX W HCPCS: Performed by: FAMILY MEDICINE

## 2024-04-18 PROCEDURE — 85025 COMPLETE CBC W/AUTO DIFF WBC: CPT

## 2024-04-18 RX ADMIN — ASPIRIN 81 MG: 81 TABLET, COATED ORAL at 09:03

## 2024-04-18 RX ADMIN — AZITHROMYCIN MONOHYDRATE 500 MG: 500 INJECTION, POWDER, LYOPHILIZED, FOR SOLUTION INTRAVENOUS at 06:14

## 2024-04-18 RX ADMIN — IPRATROPIUM BROMIDE AND ALBUTEROL SULFATE 1 DOSE: 2.5; .5 SOLUTION RESPIRATORY (INHALATION) at 08:00

## 2024-04-18 RX ADMIN — GABAPENTIN 400 MG: 400 CAPSULE ORAL at 14:08

## 2024-04-18 RX ADMIN — MORPHINE SULFATE 4 MG: 4 INJECTION, SOLUTION INTRAMUSCULAR; INTRAVENOUS at 19:53

## 2024-04-18 RX ADMIN — GUAIFENESIN 600 MG: 600 TABLET ORAL at 19:44

## 2024-04-18 RX ADMIN — CEFTRIAXONE SODIUM 1000 MG: 1 INJECTION, POWDER, FOR SOLUTION INTRAMUSCULAR; INTRAVENOUS at 05:24

## 2024-04-18 RX ADMIN — OXYCODONE 10 MG: 5 TABLET ORAL at 09:03

## 2024-04-18 RX ADMIN — IPRATROPIUM BROMIDE AND ALBUTEROL SULFATE 1 DOSE: 2.5; .5 SOLUTION RESPIRATORY (INHALATION) at 11:40

## 2024-04-18 RX ADMIN — ATORVASTATIN CALCIUM 20 MG: 10 TABLET, FILM COATED ORAL at 19:44

## 2024-04-18 RX ADMIN — OXYCODONE 10 MG: 5 TABLET ORAL at 15:59

## 2024-04-18 RX ADMIN — ENOXAPARIN SODIUM 40 MG: 100 INJECTION SUBCUTANEOUS at 09:03

## 2024-04-18 RX ADMIN — GABAPENTIN 400 MG: 400 CAPSULE ORAL at 09:03

## 2024-04-18 RX ADMIN — OXYCODONE 10 MG: 5 TABLET ORAL at 00:21

## 2024-04-18 RX ADMIN — MORPHINE SULFATE 4 MG: 4 INJECTION, SOLUTION INTRAMUSCULAR; INTRAVENOUS at 05:41

## 2024-04-18 RX ADMIN — MORPHINE SULFATE 4 MG: 4 INJECTION, SOLUTION INTRAMUSCULAR; INTRAVENOUS at 14:13

## 2024-04-18 RX ADMIN — IPRATROPIUM BROMIDE AND ALBUTEROL SULFATE 1 DOSE: 2.5; .5 SOLUTION RESPIRATORY (INHALATION) at 16:30

## 2024-04-18 RX ADMIN — SODIUM CHLORIDE, PRESERVATIVE FREE 10 ML: 5 INJECTION INTRAVENOUS at 19:53

## 2024-04-18 RX ADMIN — GABAPENTIN 400 MG: 400 CAPSULE ORAL at 19:44

## 2024-04-18 RX ADMIN — GUAIFENESIN 600 MG: 600 TABLET ORAL at 09:03

## 2024-04-18 ASSESSMENT — ENCOUNTER SYMPTOMS
VOMITING: 0
COUGH: 1
NAUSEA: 1
SORE THROAT: 0
ABDOMINAL PAIN: 1
SHORTNESS OF BREATH: 1
BACK PAIN: 0
CHEST TIGHTNESS: 1

## 2024-04-18 ASSESSMENT — PAIN DESCRIPTION - FREQUENCY: FREQUENCY: CONTINUOUS

## 2024-04-18 ASSESSMENT — PAIN DESCRIPTION - LOCATION: LOCATION: BACK

## 2024-04-18 ASSESSMENT — PAIN SCALES - GENERAL
PAINLEVEL_OUTOF10: 8
PAINLEVEL_OUTOF10: 9
PAINLEVEL_OUTOF10: 10

## 2024-04-18 ASSESSMENT — PAIN DESCRIPTION - DESCRIPTORS: DESCRIPTORS: STABBING

## 2024-04-18 ASSESSMENT — PAIN DESCRIPTION - ORIENTATION: ORIENTATION: LEFT;LOWER

## 2024-04-18 NOTE — RT PROTOCOL NOTE
RT Inhaler-Nebulizer Bronchodilator Protocol Note    There is a bronchodilator order in the chart from a provider indicating to follow the RT Bronchodilator Protocol and there is an “Initiate RT Inhaler-Nebulizer Bronchodilator Protocol” order as well (see protocol at bottom of note).    CXR Findings:  No results found.    The findings from the last RT Protocol Assessment were as follows:   History Pulmonary Disease: Smoker 15 pack years or more  Respiratory Pattern: Regular pattern and RR 12-20 bpm  Breath Sounds: Slightly diminished and/or crackles  Cough: Strong, productive  Indication for Bronchodilator Therapy: None  Bronchodilator Assessment Score: 4    Aerosolized bronchodilator medication orders have been revised according to the RT Inhaler-Nebulizer Bronchodilator Protocol below.    Respiratory Therapist to perform RT Therapy Protocol Assessment initially then follow the protocol.  Repeat RT Therapy Protocol Assessment PRN for score 0-3 or on second treatment, BID, and PRN for scores above 3.    No Indications - adjust the frequency to every 6 hours PRN wheezing or bronchospasm, if no treatments needed after 48 hours then discontinue using Per Protocol order mode.     If indication present, adjust the RT bronchodilator orders based on the Bronchodilator Assessment Score as indicated below.  Use Inhaler orders unless patient has one or more of the following: on home nebulizer, not able to hold breath for 10 seconds, is not alert and oriented, cannot activate and use MDI correctly, or respiratory rate 25 breaths per minute or more, then use the equivalent nebulizer order(s) with same Frequency and PRN reasons based on the score.  If a patient is on this medication at home then do not decrease Frequency below that used at home.    0-3 - enter or revise RT bronchodilator order(s) to equivalent RT Bronchodilator order with Frequency of every 4 hours PRN for wheezing or increased work of breathing using Per

## 2024-04-18 NOTE — CARE COORDINATION
Chart reviewed day 3. Care managed by IM. Symptoms improving productive cough. Currently on RA with sat of 92%. No CM needs identified. Carla Torres RN

## 2024-04-18 NOTE — PLAN OF CARE
Problem: Pain  Goal: Verbalizes/displays adequate comfort level or baseline comfort level  Outcome: Progressing  Flowsheets (Taken 4/18/2024 1102)  Verbalizes/displays adequate comfort level or baseline comfort level:   Encourage patient to monitor pain and request assistance   Administer analgesics based on type and severity of pain and evaluate response   Consider cultural and social influences on pain and pain management   Assess pain using appropriate pain scale   Implement non-pharmacological measures as appropriate and evaluate response   Notify Licensed Independent Practitioner if interventions unsuccessful or patient reports new pain

## 2024-04-19 VITALS
BODY MASS INDEX: 24.83 KG/M2 | DIASTOLIC BLOOD PRESSURE: 80 MMHG | HEIGHT: 68 IN | TEMPERATURE: 98 F | RESPIRATION RATE: 18 BRPM | OXYGEN SATURATION: 93 % | SYSTOLIC BLOOD PRESSURE: 132 MMHG | HEART RATE: 78 BPM | WEIGHT: 163.8 LBS

## 2024-04-19 LAB
BACTERIA BLD CULT ORG #2: NORMAL
BACTERIA BLD CULT: NORMAL

## 2024-04-19 PROCEDURE — 6370000000 HC RX 637 (ALT 250 FOR IP): Performed by: INTERNAL MEDICINE

## 2024-04-19 PROCEDURE — 6360000002 HC RX W HCPCS: Performed by: FAMILY MEDICINE

## 2024-04-19 PROCEDURE — 94640 AIRWAY INHALATION TREATMENT: CPT

## 2024-04-19 PROCEDURE — 94669 MECHANICAL CHEST WALL OSCILL: CPT

## 2024-04-19 PROCEDURE — 6360000002 HC RX W HCPCS: Performed by: INTERNAL MEDICINE

## 2024-04-19 PROCEDURE — 2580000003 HC RX 258: Performed by: FAMILY MEDICINE

## 2024-04-19 PROCEDURE — 6370000000 HC RX 637 (ALT 250 FOR IP): Performed by: FAMILY MEDICINE

## 2024-04-19 RX ORDER — GUAIFENESIN 600 MG/1
600 TABLET, EXTENDED RELEASE ORAL 2 TIMES DAILY
Qty: 14 TABLET | Refills: 0 | Status: SHIPPED | OUTPATIENT
Start: 2024-04-19 | End: 2024-04-26

## 2024-04-19 RX ORDER — OXYCODONE HYDROCHLORIDE AND ACETAMINOPHEN 5; 325 MG/1; MG/1
1 TABLET ORAL EVERY 6 HOURS PRN
Qty: 12 TABLET | Refills: 0 | Status: SHIPPED | OUTPATIENT
Start: 2024-04-19 | End: 2024-04-22

## 2024-04-19 RX ORDER — AMOXICILLIN AND CLAVULANATE POTASSIUM 875; 125 MG/1; MG/1
1 TABLET, FILM COATED ORAL 2 TIMES DAILY
Qty: 14 TABLET | Refills: 0 | Status: SHIPPED | OUTPATIENT
Start: 2024-04-19 | End: 2024-04-26

## 2024-04-19 RX ADMIN — CEFTRIAXONE SODIUM 1000 MG: 1 INJECTION, POWDER, FOR SOLUTION INTRAMUSCULAR; INTRAVENOUS at 04:34

## 2024-04-19 RX ADMIN — IPRATROPIUM BROMIDE AND ALBUTEROL SULFATE 1 DOSE: 2.5; .5 SOLUTION RESPIRATORY (INHALATION) at 07:45

## 2024-04-19 RX ADMIN — GABAPENTIN 400 MG: 400 CAPSULE ORAL at 08:39

## 2024-04-19 RX ADMIN — MORPHINE SULFATE 4 MG: 4 INJECTION, SOLUTION INTRAMUSCULAR; INTRAVENOUS at 04:28

## 2024-04-19 RX ADMIN — IPRATROPIUM BROMIDE AND ALBUTEROL SULFATE 1 DOSE: 2.5; .5 SOLUTION RESPIRATORY (INHALATION) at 11:15

## 2024-04-19 RX ADMIN — OXYCODONE 10 MG: 5 TABLET ORAL at 08:39

## 2024-04-19 RX ADMIN — ENOXAPARIN SODIUM 40 MG: 100 INJECTION SUBCUTANEOUS at 08:39

## 2024-04-19 RX ADMIN — SODIUM CHLORIDE, PRESERVATIVE FREE 10 ML: 5 INJECTION INTRAVENOUS at 08:48

## 2024-04-19 RX ADMIN — ASPIRIN 81 MG: 81 TABLET, COATED ORAL at 08:39

## 2024-04-19 RX ADMIN — GUAIFENESIN 600 MG: 600 TABLET ORAL at 08:39

## 2024-04-19 RX ADMIN — GABAPENTIN 400 MG: 400 CAPSULE ORAL at 14:50

## 2024-04-19 ASSESSMENT — PAIN SCALES - GENERAL: PAINLEVEL_OUTOF10: 8

## 2024-04-19 ASSESSMENT — PAIN DESCRIPTION - LOCATION: LOCATION: BACK

## 2024-04-19 ASSESSMENT — PAIN DESCRIPTION - ORIENTATION: ORIENTATION: LEFT;LOWER

## 2024-04-19 ASSESSMENT — PAIN DESCRIPTION - DESCRIPTORS: DESCRIPTORS: STABBING

## 2024-04-19 NOTE — DISCHARGE INSTRUCTIONS
Patient Discharge Instructions:    Follow up:  1.  Primary Care Provider in the next 1-2 weeks.  2.  Pulmonology Dr. Lau or Partner in 4-6 weeks for hospital follow-up and repeat scans

## 2024-04-19 NOTE — PLAN OF CARE
Problem: Pain  Goal: Verbalizes/displays adequate comfort level or baseline comfort level  Outcome: Adequate for Discharge     Problem: Safety - Adult  Goal: Free from fall injury  Outcome: Adequate for Discharge     Problem: Respiratory - Adult  Goal: Achieves optimal ventilation and oxygenation  Outcome: Adequate for Discharge     Problem: Infection - Adult  Goal: Absence of infection at discharge  Outcome: Adequate for Discharge  Goal: Absence of infection during hospitalization  Outcome: Adequate for Discharge  Goal: Absence of fever/infection during anticipated neutropenic period  Outcome: Adequate for Discharge     Problem: Metabolic/Fluid and Electrolytes - Adult  Goal: Electrolytes maintained within normal limits  Outcome: Adequate for Discharge  Goal: Hemodynamic stability and optimal renal function maintained  Outcome: Adequate for Discharge  Goal: Glucose maintained within prescribed range  Outcome: Adequate for Discharge     Problem: Hematologic - Adult  Goal: Maintains hematologic stability  Outcome: Adequate for Discharge     Problem: Chronic Conditions and Co-morbidities  Goal: Patient's chronic conditions and co-morbidity symptoms are monitored and maintained or improved  Outcome: Adequate for Discharge      Your current Orthopaedic problem we are working together to treat is:  right knee meniscus tear.    INJECTION  You have received an injection of Cortisone to your Right  knee. Common symptoms of mild swelling and pain may accompany the injection for 24-48 hours. Ice and rest will typically reduce the symptoms. Redness, fever, or intense swelling are not typical. If this occurs, you should call the office for further advice.     SURGERY  I have recommended the following surgical procedure: knee scope to remove the torn part of the meniscus. Please follow the specific instructions provided by the surgical scheduler for the testing, physician evaluation, pre-operative education and other preparatory requirements of the procedure to help assure a safe and successful result. You can contact Kimberley STARR at [phone].    It is recommended you schedule a follow-up appointment with Lorenzo Marie PA-C 2-3 weeks after surgery.    Office hours are 8:00 am to 5:00 pm Monday through Friday. If it is urgent that you speak with someone outside of these hours, our [facility] Call Center will be able to assist you. You can reach the office by calling the:    [facility]  [address] [place] [address] [phone] during office hours  [phone] after office hours    We do highly recommend [facility] with [facility], if you do not already have this. You can request access via the internet or by simply talking with a  at any of the clinics.   [email or web address]    Thank you for choosing [facility] as your Orthopedic provider!    Arthroscopic Knee Surgery    The Dressing/Bandage:  After surgery, you will have a dressing on your operative knee.  You may remove the dressing and get incision wet in the shower 3 days after surgery.  Do not go into a hot tub/submerge incision in water until at least 4 weeks after your surgery and your incisions are completely healed.  You will have stitches for the incisions. If the incisions are still draining after you remove the dressing, put Band-Aids on the incisions until two days pass without any drainage.  The stitches will be removed in the office about 10-14 days after your surgery.    Pain Medication/Ice:  You will be given prescriptions for pain medications after surgery.  Most patients receive Vicodin or Percocet.  It is best to call 48-72 hours in advance of needing a refill so that you don’t run out of medication.  No pain scripts can be filled on the weekend.    Keep your leg elevated as much as possible for the first few days after surgery.  Your knee should be elevated above the level of your heart. This means that you should be reclined or lying down with the lower leg up on a pillow or two. DO NOT PLACE PILLOW UNDER A BENT KNEE. You may apply ice for 20 minutes at a time (with two hours off) in the first few days after surgery to help with swelling and pain.    Anticoagulation:    Take an aspirin 325mg by mouth daily for 14 days, to minimize the risk of blood clot.  Common symptoms of DVT (blood clot) include: localized pain, swelling, calf tenderness, redness or discoloration of the skin. PE symptoms include: shortness of breath, rapid pulse, sweating, and chest pain that worsens with inspiration, coughing up blood, light headedness, feelings of apprehension. If you experience any of these symptoms call the office or go to the closest ER.    Bearing Weight:  Most patients need crutches or other assistance after arthroscopic surgery. You are able to bear as much weight as tolerated after the surgery unless otherwise instructed. The crutches can be discontinued once you feel comfortable and are able to walk without a limp.    Postoperative Visits:  2-3 weeks after surgery  [facility]  [address] [place] [address] [phone] during office hours  [phone] after office hours    Physical Therapy:    Most patients are able to work on the exercise program on their own. (See descriptions below).  If therapy is needed you will be given an order for this at your first post-op appointment.     Return to Work/Sports:  You may return to desk type work usually within a couple days.  Return to sports or heavy labor is at the earliest 2 weeks, but may be a month or two. If you have discontinued narcotic pain medication and have full strength in operative extremity, you may return to driving.     Remember though, that people who have arthroscopy can have many different diagnoses and preexisting conditions, so each patient's arthroscopic surgery is unique to that person. Recovery time will reflect that individuality.    Please call Dr. Marie’s office, your PCP, or report to the ED if you have any nausea, vomiting, fever greater than 101.5 F, swelling, chest pain, shortness of breath, increased pain/redness/drainage from your incision sites, numbness/tingling, or any other concerning symptoms.

## 2024-04-19 NOTE — PROGRESS NOTES
04/18/24 1621   Encounter Summary   Encounter Overview/Reason  Spiritual/Emotional Needs   Service Provided For: Patient   Last Encounter  04/18/24  (emotional support, prayer)   Begin Time 1510   End Time  1520   Total Time Calculated 10 min   Spiritual/Emotional needs   Type Spiritual Support       
  Hospital Medicine Progress Note      Date of Admission: 4/15/2024  Hospital Day: 2    Chief Admission Complaint:  SOB     Subjective:  Reports ongoing SOB at rest. Cough is productive. No hypoxia.     Presenting Admission History:       63 [facility]. [facility]x of severe SOB/LONG w/ associated L sided Pleuritic Chest Pain, unable to take a deep breath 2nd to same.      CTPA negative for acute PE but demonstrated LLL infiltrate.      The patient denies any fever/chills or other signs/sxs of systemic illness or identifiable aggravating/alleviating factors\"    Assessment/Plan:       Current Principal Problem:  Multifocal pneumonia     PNA - likely gram positive CAP w/ Strep Pneumonia.  Started on empiric Rocephin/Azithro in ED on 15 April. Acapella/Mucinex for pulmonary toilet.      Sepsis - w/ Leukocytosis/Tachycardia POArrival 2nd to above infection.  Continue IVF as appropriate and monitor clinical response w/ ABX as written.      HTN - w/out known CAD and no evidence of active signs/sxs of ischemia/failure. Currently controlled on home meds w/ vitals documented and reviewed.     HyperLipidemia - normally controlled on home Statin. Continued.  F/U w/ PCP outpt for medication initiation/adjustment as needed.      GERD - w/out active signs/sxs of dysphagia/odynophagia. No evidence of active PUD or hx of GI bleed. Controlled on home PPI - continue.      Physical Exam Performed:      General appearance:  No apparent distress  Respiratory:  Normal respiratory effort.   Cardiovascular:  Regular rate and rhythm.  Abdomen:  Soft, non-tender, non-distended.  Musculoskelatal:  No edema  Neurologic:  Non-focal  Psychiatric:  Alert and oriented    BP (!) 152/77   Pulse (!) 105   Temp 100 °F (37.8 °C) (Oral)   Resp 18   Ht 1.727 m (5' 8\")   Wt 74.3 kg (163 lb 12.8 oz)   SpO2 91%   BMI 24.91 kg/m²     Diet: ADULT DIET; Regular  DVT Prophylaxis: [x]PPx LMWH  []SQ Heparin  []IPC/SCDs  
  Hospital Medicine Progress Note      Date of Admission: 4/15/2024  Hospital Day: 3    Chief Admission Complaint:  SOB     Subjective:  Reports SOB about the same. Cough is productive. No hypoxia.     Presenting Admission History:       63 [facility]. [facility] day hx of severe SOB/LONG w/ associated L sided Pleuritic Chest Pain, unable to take a deep breath 2nd to same.      CTPA negative for acute PE but demonstrated LLL infiltrate.      The patient denies any fever/chills or other signs/sxs of systemic illness or identifiable aggravating/alleviating factors\"    Assessment/Plan:       Current Principal Problem:  Multifocal pneumonia     PNA - likely gram positive CAP w/ Strep Pneumonia.  Started on empiric Rocephin/Azithro in ED on 15 April. Acapella/Mucinex for pulmonary toilet. Scant wheezing, would benefit from Duonebs to help with pulmonary toilet.      Sepsis - w/ Leukocytosis/Tachycardia POArrival 2nd to above infection.  Continue IVF as appropriate and monitor clinical response w/ ABX as written.      HTN - w/out known CAD and no evidence of active signs/sxs of ischemia/failure. Currently controlled on home meds w/ vitals documented and reviewed.     HyperLipidemia - normally controlled on home Statin. Continued.  F/U w/ PCP outpt for medication initiation/adjustment as needed.      GERD - w/out active signs/sxs of dysphagia/odynophagia. No evidence of active PUD or hx of GI bleed. Controlled on home PPI - continue.      Physical Exam Performed:      General appearance:  No apparent distress  Respiratory:  Normal respiratory effort.   Cardiovascular:  Regular rate and rhythm.  Abdomen:  Soft, non-tender, non-distended.  Musculoskelatal:  No edema  Neurologic:  Non-focal  Psychiatric:  Alert and oriented    BP (!) 163/91   Pulse 94   Temp 98.2 °F (36.8 °C) (Oral)   Resp 20   Ht 1.727 m (5' 8\")   Wt 74.3 kg (163 lb 12.8 oz)   SpO2 91%   BMI 24.91 kg/m²     Diet: ADULT DIET; 
  Hospital Medicine Progress Note      Date of Admission: 4/15/2024  Hospital Day: 5    Chief Admission Complaint:  SOB     Subjective:  Symptoms improving. Cough is productive. No hypoxia.     Presenting Admission History:       63 [facility]. [facility]x of severe SOB/LONG w/ associated L sided Pleuritic Chest Pain, unable to take a deep breath 2nd to same.      CTPA negative for acute PE but demonstrated LLL infiltrate.      The patient denies any fever/chills or other signs/sxs of systemic illness or identifiable aggravating/alleviating factors\"    Assessment/Plan:       Current Principal Problem:  Multifocal pneumonia     PNA - likely gram positive CAP w/ Strep Pneumonia.  Started on empiric Rocephin/Azithro in ED on 15 April. Acapella/Mucinex for pulmonary toilet. Scant wheezing, would benefit from Duonebs to help with pulmonary toilet. WBC improving.     Hx of Mediastinal Lymphadenopathy, LLL Pulmonary nodule. Hx of Empyema and granulomatous disease. The LLL calcified nodule appears stable from CT 1/2023. Would benefit from outpatient Pulmonary follow-up.       Sepsis - w/ Leukocytosis/Tachycardia POArrival 2nd to above infection.  Continue IVF as appropriate and monitor clinical response w/ ABX as written.      HTN - w/out known CAD and no evidence of active signs/sxs of ischemia/failure. Currently controlled on home meds w/ vitals documented and reviewed.     HyperLipidemia - normally controlled on home Statin. Continued.  F/U w/ PCP outpt for medication initiation/adjustment as needed.      GERD - w/out active signs/sxs of dysphagia/odynophagia. No evidence of active PUD or hx of GI bleed. Controlled on home PPI - continue.      Physical Exam Performed:      General appearance:  No apparent distress  Respiratory:  Normal respiratory effort.   Cardiovascular:  Regular rate and rhythm.  Abdomen:  Soft, non-tender, non-distended.  Musculoskelatal:  No edema  Neurologic:  
A&Ox4. No complaints of /SOB, chest pain or heaviness. No use of accesory muscles when breathing. On room air.   
A&Ox4. No complaints of /SOB, chest pain or heaviness. No use of accessory muscles when breathing. Still with occasional cough.   
A&Ox4. No complaints of /SOB, chest pain or heaviness. With coughing episodes, expectorates yellowish-pinkish sputum. No use of accessory muscles when breathing.   
Assessment completed and documented. VSS. A/ox4. Pt has moist cough with tan sputum. Pt c/o 8/10 left side pain when coughing, gave prn pain medication per MAR. Pt ambulates independently in room. Bed locked and in lowest position. Bedside table and call light within reach. Denies further needs at this time.   
Discharge education provided both verbally and written, patient verbalized understanding, denies questions. PIV removed without complications. Patient left with all belongings including phone,  and medications.  
Patient cont to have c/o left rib pain, states worse when I take a deep breath or cough. Continues to have a moist, productive cough. Mod amt of pink/red tinged thick sputum. Incentive spirometer at bedside, reminded/educated on use as well as benefits. Patient alert/oriented voices understanding. Continues Cefepime and zithromax IV as per orders.   
Pt A/O x 4, VSS. Pt complaining of pain 10/10 when coughing, especially in lower left abdominal area. Administered pain medication per orders. Pt mentioned slight burning during urination. Secure message sent to hospitalist to address. Currently awaiting response. Pt ambulating to RR and tolerating well. Pt not complaining of dizziness or nausea, but reports lack of appetite. Pt reports coughing up pink-tinged sputum. IS at bedside.  Bed locked and in low position. Bedside table and call light within reach. Pt reports no other needs at this time. All care per orders.   
Pt alert and orientated. Call light within reach. Pt medicated for headache. Bed alarm on and working.Pastora Hitchcock RN   
Spoke to pt's sister on phone and gave updates.  
Non-focal  Psychiatric:  Alert and oriented    BP (!) 143/80   Pulse 90   Temp 98.7 °F (37.1 °C) (Oral)   Resp 18   Ht 1.727 m (5' 8\")   Wt 74.3 kg (163 lb 12.8 oz)   SpO2 92%   BMI 24.91 kg/m²     Diet: ADULT DIET; Regular  DVT Prophylaxis: [x]PPx LMWH  []SQ Heparin  []IPC/SCDs  []Eliquis  []Xarelto  []Coumadin  [] Heparin Drip  []Other -      Code status: Full Code  PT/OT Eval Status:   [x]NOT yet ordered  []Ordered and Pending   []Seen with Recommendations for:  []Home independently  []Home w/ assist  []HHC  []SNF  []Acute Rehab    Anticipated Discharge Day/Date:  1-2 days pending symptomatic improvement    Anticipated Discharge Location: [x]Home  []HHC  []SNF  []Acute Rehab  []ECF  []LTAC  []Hospice  []Other -      Consults:      IP CONSULT TO HOSPITALIST      This patient has a high likelihood of being discharged tomorrow and is appropriate for A1 Discharge Unit in AM pending clinical course overnight: []Yes  [x]No    ------------------------------------------------------------------------------------------------------------------------------------------------------------------------    MDM    Moderate      Medications:  Personally reviewed in detail in conjunction w/ labs as documented for evidence of drug toxicity.     Infusion Medications    sodium chloride       Scheduled Medications    ipratropium 0.5 mg-albuterol 2.5 mg  1 Dose Inhalation Q4H WA RT    guaiFENesin  600 mg Oral BID    aspirin  81 mg Oral Daily    atorvastatin  20 mg Oral Daily    sodium chloride flush  5-40 mL IntraVENous 2 times per day    enoxaparin  40 mg SubCUTAneous Daily    cefTRIAXone (ROCEPHIN) IV  1,000 mg IntraVENous Q24H    gabapentin  400 mg Oral TID     PRN Meds: sodium chloride flush, sodium chloride, ondansetron **OR** ondansetron, polyethylene glycol, acetaminophen **OR** acetaminophen, oxyCODONE **OR** oxyCODONE, morphine **OR** morphine     Labs:  Personally reviewed and interpreted for clinical significance.

## 2024-04-19 NOTE — CARE COORDINATION
Chart reviewed day 4 spoke with patient. Stated feeling some better. Continues on RA. No concerns for d/c. Will have ride. Following. Carla Torres RN

## 2024-04-21 ENCOUNTER — TELEPHONE (OUTPATIENT)
Dept: CASE MANAGEMENT | Age: 64
End: 2024-04-21

## 2025-03-01 ENCOUNTER — APPOINTMENT (OUTPATIENT)
Dept: CT IMAGING | Age: 65
End: 2025-03-01
Payer: MEDICARE

## 2025-03-01 ENCOUNTER — APPOINTMENT (OUTPATIENT)
Dept: GENERAL RADIOLOGY | Age: 65
End: 2025-03-01
Payer: MEDICARE

## 2025-03-01 ENCOUNTER — HOSPITAL ENCOUNTER (EMERGENCY)
Age: 65
Discharge: HOME | End: 2025-03-01
Attending: STUDENT IN AN ORGANIZED HEALTH CARE EDUCATION/TRAINING PROGRAM
Payer: MEDICARE

## 2025-03-01 VITALS
HEIGHT: 68 IN | OXYGEN SATURATION: 95 % | DIASTOLIC BLOOD PRESSURE: 93 MMHG | TEMPERATURE: 99.2 F | BODY MASS INDEX: 27.28 KG/M2 | SYSTOLIC BLOOD PRESSURE: 163 MMHG | WEIGHT: 180 LBS | HEART RATE: 81 BPM | RESPIRATION RATE: 21 BRPM

## 2025-03-01 DIAGNOSIS — B33.8 RESPIRATORY SYNCYTIAL VIRUS (RSV): ICD-10-CM

## 2025-03-01 DIAGNOSIS — R05.1 ACUTE COUGH: Primary | ICD-10-CM

## 2025-03-01 DIAGNOSIS — J40 BRONCHITIS: ICD-10-CM

## 2025-03-01 LAB
ALBUMIN SERPL-MCNC: 3.6 G/DL (ref 3.4–5)
ALBUMIN/GLOB SERPL: 1.1 {RATIO} (ref 1.1–2.2)
ALP SERPL-CCNC: 92 U/L (ref 40–129)
ALT SERPL-CCNC: 9 U/L (ref 10–40)
ANION GAP SERPL CALCULATED.3IONS-SCNC: 12 MMOL/L (ref 3–16)
AST SERPL-CCNC: 15 U/L (ref 15–37)
BASE EXCESS BLDV CALC-SCNC: 1.9 MMOL/L (ref -3–3)
BASOPHILS # BLD: 0 K/UL (ref 0–0.2)
BASOPHILS NFR BLD: 0.5 %
BILIRUB SERPL-MCNC: 0.3 MG/DL (ref 0–1)
BUN SERPL-MCNC: 12 MG/DL (ref 7–20)
CALCIUM SERPL-MCNC: 8.9 MG/DL (ref 8.3–10.6)
CHLORIDE SERPL-SCNC: 95 MMOL/L (ref 99–110)
CO2 BLDV-SCNC: 27 MMOL/L
CO2 SERPL-SCNC: 26 MMOL/L (ref 21–32)
COHGB MFR BLDV: 3.7 % (ref 0–1.5)
CREAT SERPL-MCNC: 0.7 MG/DL (ref 0.8–1.3)
DEPRECATED RDW RBC AUTO: 13.7 % (ref 12.4–15.4)
EKG ATRIAL RATE: 91 BPM
EKG DIAGNOSIS: NORMAL
EKG P AXIS: 58 DEGREES
EKG P-R INTERVAL: 146 MS
EKG Q-T INTERVAL: 356 MS
EKG QRS DURATION: 86 MS
EKG QTC CALCULATION (BAZETT): 437 MS
EKG R AXIS: 45 DEGREES
EKG T AXIS: 49 DEGREES
EKG VENTRICULAR RATE: 91 BPM
EOSINOPHIL # BLD: 0 K/UL (ref 0–0.6)
EOSINOPHIL NFR BLD: 0 %
FLUAV RNA RESP QL NAA+PROBE: NOT DETECTED
FLUBV RNA RESP QL NAA+PROBE: NOT DETECTED
GFR SERPLBLD CREATININE-BSD FMLA CKD-EPI: >90 ML/MIN/{1.73_M2}
GLUCOSE SERPL-MCNC: 106 MG/DL (ref 70–99)
HCO3 BLDV-SCNC: 25.7 MMOL/L (ref 23–29)
HCT VFR BLD AUTO: 36.3 % (ref 40.5–52.5)
HGB BLD-MCNC: 12.4 G/DL (ref 13.5–17.5)
LACTATE BLDV-SCNC: 1.2 MMOL/L (ref 0.4–1.9)
LYMPHOCYTES # BLD: 0.7 K/UL (ref 1–5.1)
LYMPHOCYTES NFR BLD: 7 %
MCH RBC QN AUTO: 30.4 PG (ref 26–34)
MCHC RBC AUTO-ENTMCNC: 34.2 G/DL (ref 31–36)
MCV RBC AUTO: 88.8 FL (ref 80–100)
METHGB MFR BLDV: 0.3 %
MONOCYTES # BLD: 1.2 K/UL (ref 0–1.3)
MONOCYTES NFR BLD: 12.1 %
NEUTROPHILS # BLD: 8.3 K/UL (ref 1.7–7.7)
NEUTROPHILS NFR BLD: 80.4 %
NT-PROBNP SERPL-MCNC: 447 PG/ML (ref 0–124)
O2 THERAPY: ABNORMAL
PCO2 BLDV: 37.7 MMHG (ref 40–50)
PH BLDV: 7.45 [PH] (ref 7.35–7.45)
PLATELET # BLD AUTO: 262 K/UL (ref 135–450)
PMV BLD AUTO: 7.3 FL (ref 5–10.5)
PO2 BLDV: 43.5 MMHG (ref 25–40)
POTASSIUM SERPL-SCNC: 3.8 MMOL/L (ref 3.5–5.1)
PROT SERPL-MCNC: 6.8 G/DL (ref 6.4–8.2)
RBC # BLD AUTO: 4.09 M/UL (ref 4.2–5.9)
RSV AG NOSE QL: POSITIVE
S PYO AG THROAT QL: NEGATIVE
SAO2 % BLDV: 82 %
SARS-COV-2 RNA RESP QL NAA+PROBE: NOT DETECTED
SODIUM SERPL-SCNC: 133 MMOL/L (ref 136–145)
TROPONIN, HIGH SENSITIVITY: 11 NG/L (ref 0–22)
TROPONIN, HIGH SENSITIVITY: 11 NG/L (ref 0–22)
WBC # BLD AUTO: 10.3 K/UL (ref 4–11)

## 2025-03-01 PROCEDURE — 80053 COMPREHEN METABOLIC PANEL: CPT

## 2025-03-01 PROCEDURE — 93010 ELECTROCARDIOGRAM REPORT: CPT | Performed by: INTERNAL MEDICINE

## 2025-03-01 PROCEDURE — 87807 RSV ASSAY W/OPTIC: CPT

## 2025-03-01 PROCEDURE — 96374 THER/PROPH/DIAG INJ IV PUSH: CPT

## 2025-03-01 PROCEDURE — 87880 STREP A ASSAY W/OPTIC: CPT

## 2025-03-01 PROCEDURE — 99285 EMERGENCY DEPT VISIT HI MDM: CPT

## 2025-03-01 PROCEDURE — 6360000004 HC RX CONTRAST MEDICATION: Performed by: STUDENT IN AN ORGANIZED HEALTH CARE EDUCATION/TRAINING PROGRAM

## 2025-03-01 PROCEDURE — 6370000000 HC RX 637 (ALT 250 FOR IP): Performed by: STUDENT IN AN ORGANIZED HEALTH CARE EDUCATION/TRAINING PROGRAM

## 2025-03-01 PROCEDURE — 71260 CT THORAX DX C+: CPT

## 2025-03-01 PROCEDURE — 71045 X-RAY EXAM CHEST 1 VIEW: CPT

## 2025-03-01 PROCEDURE — 93005 ELECTROCARDIOGRAM TRACING: CPT | Performed by: STUDENT IN AN ORGANIZED HEALTH CARE EDUCATION/TRAINING PROGRAM

## 2025-03-01 PROCEDURE — 36415 COLL VENOUS BLD VENIPUNCTURE: CPT

## 2025-03-01 PROCEDURE — 2500000003 HC RX 250 WO HCPCS: Performed by: STUDENT IN AN ORGANIZED HEALTH CARE EDUCATION/TRAINING PROGRAM

## 2025-03-01 PROCEDURE — 6360000002 HC RX W HCPCS: Performed by: STUDENT IN AN ORGANIZED HEALTH CARE EDUCATION/TRAINING PROGRAM

## 2025-03-01 PROCEDURE — 82803 BLOOD GASES ANY COMBINATION: CPT

## 2025-03-01 PROCEDURE — 85025 COMPLETE CBC W/AUTO DIFF WBC: CPT

## 2025-03-01 PROCEDURE — 84484 ASSAY OF TROPONIN QUANT: CPT

## 2025-03-01 PROCEDURE — 83880 ASSAY OF NATRIURETIC PEPTIDE: CPT

## 2025-03-01 PROCEDURE — 83605 ASSAY OF LACTIC ACID: CPT

## 2025-03-01 PROCEDURE — 87636 SARSCOV2 & INF A&B AMP PRB: CPT

## 2025-03-01 RX ORDER — PREDNISONE 10 MG/1
TABLET ORAL
Qty: 20 TABLET | Refills: 0 | Status: SHIPPED | OUTPATIENT
Start: 2025-03-01 | End: 2025-03-11

## 2025-03-01 RX ORDER — IPRATROPIUM BROMIDE AND ALBUTEROL SULFATE 2.5; .5 MG/3ML; MG/3ML
1 SOLUTION RESPIRATORY (INHALATION) ONCE
Status: COMPLETED | OUTPATIENT
Start: 2025-03-01 | End: 2025-03-01

## 2025-03-01 RX ORDER — IOPAMIDOL 755 MG/ML
75 INJECTION, SOLUTION INTRAVASCULAR
Status: COMPLETED | OUTPATIENT
Start: 2025-03-01 | End: 2025-03-01

## 2025-03-01 RX ORDER — DOXYCYCLINE HYCLATE 100 MG
100 TABLET ORAL 2 TIMES DAILY
Qty: 14 TABLET | Refills: 0 | Status: SHIPPED | OUTPATIENT
Start: 2025-03-01 | End: 2025-03-08

## 2025-03-01 RX ORDER — ALBUTEROL SULFATE 90 UG/1
2 INHALANT RESPIRATORY (INHALATION) 4 TIMES DAILY PRN
Qty: 18 G | Refills: 0 | Status: SHIPPED | OUTPATIENT
Start: 2025-03-01

## 2025-03-01 RX ADMIN — IOPAMIDOL 75 ML: 755 INJECTION, SOLUTION INTRAVENOUS at 10:45

## 2025-03-01 RX ADMIN — METHYLPREDNISOLONE SODIUM SUCCINATE 125 MG: 125 INJECTION INTRAMUSCULAR; INTRAVENOUS at 09:20

## 2025-03-01 RX ADMIN — IPRATROPIUM BROMIDE AND ALBUTEROL SULFATE 1 DOSE: 2.5; .5 SOLUTION RESPIRATORY (INHALATION) at 09:19

## 2025-03-01 ASSESSMENT — ENCOUNTER SYMPTOMS
COUGH: 1
SHORTNESS OF BREATH: 1

## 2025-03-01 ASSESSMENT — LIFESTYLE VARIABLES
HOW MANY STANDARD DRINKS CONTAINING ALCOHOL DO YOU HAVE ON A TYPICAL DAY: PATIENT DOES NOT DRINK
HOW OFTEN DO YOU HAVE A DRINK CONTAINING ALCOHOL: NEVER

## 2025-03-01 ASSESSMENT — PAIN SCALES - GENERAL: PAINLEVEL_OUTOF10: 0

## 2025-03-01 ASSESSMENT — PAIN - FUNCTIONAL ASSESSMENT: PAIN_FUNCTIONAL_ASSESSMENT: 0-10

## 2025-03-01 NOTE — ED NOTES
Patient ambulated down hallway and back, approx 100ft, o2 94-95% RA and HR 96. Patient resting back in bed, MD aware.

## 2025-03-01 NOTE — ED PROVIDER NOTES
Emergency Department Provider Note  Location: [facility] EMERGENCY DEPARTMENT  3/1/2025     Patient Identification  Brian WAGNER Covert is a 64 y.o. male    Chief Complaint  Shortness of Breath (For the past 2 days. Currently receiving radiation for lung ca. Pt was 84% on room air for EMS. Duoneb given enroute)      Mode of Arrival  EMS    HPI  (History provided by patient)  This is a 64 y.o. male with a PMH significant for type 2 diabetes, hypertension  presented today for shortness of breath.  Patient reports that he has been short of breath for the last 2 days.  Endorsing productive cough, body aches fatigue.  Denies any fever.  Denies any chest pain.  Per EMS he was 84% on room air when they arrived.  He was given a DuoNeb and reports some symptomatic improvement.  Denies any vomiting or abdominal pain.  Denies any diarrhea or constipation.  Patient reports he has history of lung cancer and is currently undergoing chemoradiation.  His last treatment was last week.    ROS  Review of Systems   Respiratory:  Positive for cough and shortness of breath.    All other systems reviewed and are negative.        I have reviewed the following nursing documentation:  Allergies: No Known Allergies    Past medical history:  has a past medical history of Anxiety, Cancer (HCC), Clostridium difficile infection (03/23/2020), Clostridium difficile infection (03/23/2020), GERD (gastroesophageal reflux disease), and Hypertension.    Past surgical history:  has a past surgical history that includes knee surgery (Right); Hand surgery (Left); Ankle surgery (Right); Rotator cuff repair (Right); and Abdomen surgery.    Home medications:   Prior to Admission medications    Medication Sig Start Date End Date Taking? Authorizing Provider   doxycycline hyclate (VIBRA-TABS) 100 MG tablet Take 1 tablet by mouth 2 times daily for 7 days 3/1/25 3/8/25 Yes Melania Hurd MD   predniSONE (DELTASONE) 10 MG tablet Take 4 tablets by mouth once  care time is 0 minutes, which excludes separately billable procedures and updating family. Time spent is specifically for management of the presenting complaint and symptoms initially, direct bedside care, reevaluation, review of records, and consultation.  There was a high probability of clinically significant life-threatening deterioration in the patient's condition, which required my urgent intervention.     This chart was generated in part by using Dragon Dictation system and may contain errors related to that system including errors in grammar, punctuation, and spelling, as well as words and phrases that may be inappropriate. If there are any questions or concerns please feel free to contact the dictating provider for clarification.     Melania Hurd MD   Acute Care [facility]        Melania Hurd MD  03/01/25 [id number]

## 2025-03-11 ENCOUNTER — TELEPHONE (OUTPATIENT)
Dept: CASE MANAGEMENT | Age: 65
End: 2025-03-11

## 2025-03-11 NOTE — TELEPHONE ENCOUNTER
Imaging report CT Chest 3/1/25 with f/u imaging recommendations sent to Navid MAYES(R)  Patient Navigator  Incidentals/Lung Navigation  [email or web address]

## 2025-04-25 ENCOUNTER — HOSPITAL ENCOUNTER (OUTPATIENT)
Dept: CT IMAGING | Age: 65
Discharge: HOME | End: 2025-04-25
Attending: RADIOLOGY
Payer: MEDICARE

## 2025-04-25 DIAGNOSIS — C34.90 NON-SMALL CELL LUNG CANCER, UNSPECIFIED LATERALITY (HCC): ICD-10-CM

## 2025-04-25 PROCEDURE — 71250 CT THORAX DX C-: CPT
